# Patient Record
Sex: MALE | Race: BLACK OR AFRICAN AMERICAN | Employment: OTHER | URBAN - METROPOLITAN AREA
[De-identification: names, ages, dates, MRNs, and addresses within clinical notes are randomized per-mention and may not be internally consistent; named-entity substitution may affect disease eponyms.]

---

## 2017-05-02 ENCOUNTER — OFFICE VISIT (OUTPATIENT)
Dept: INTERNAL MEDICINE CLINIC | Age: 72
End: 2017-05-02

## 2017-05-02 VITALS
SYSTOLIC BLOOD PRESSURE: 122 MMHG | BODY MASS INDEX: 26.18 KG/M2 | WEIGHT: 204 LBS | HEART RATE: 50 BPM | OXYGEN SATURATION: 98 % | HEIGHT: 74 IN | DIASTOLIC BLOOD PRESSURE: 80 MMHG

## 2017-05-02 DIAGNOSIS — Z23 NEED FOR VACCINATION: ICD-10-CM

## 2017-05-02 DIAGNOSIS — E78.2 MIXED HYPERLIPIDEMIA: Primary | ICD-10-CM

## 2017-05-02 DIAGNOSIS — I10 ESSENTIAL HYPERTENSION: ICD-10-CM

## 2017-05-02 DIAGNOSIS — R41.3 MEMORY CHANGE: ICD-10-CM

## 2017-05-02 DIAGNOSIS — R73.9 HYPERGLYCEMIA: ICD-10-CM

## 2017-05-02 DIAGNOSIS — I25.10 CORONARY ARTERY DISEASE INVOLVING NATIVE HEART WITHOUT ANGINA PECTORIS, UNSPECIFIED VESSEL OR LESION TYPE: ICD-10-CM

## 2017-05-02 LAB
A/G RATIO: 1.8 (ref 1.1–2.2)
ALBUMIN SERPL-MCNC: 4.2 G/DL (ref 3.4–5)
ALP BLD-CCNC: 42 U/L (ref 40–129)
ALT SERPL-CCNC: 22 U/L (ref 10–40)
ANION GAP SERPL CALCULATED.3IONS-SCNC: 15 MMOL/L (ref 3–16)
AST SERPL-CCNC: 25 U/L (ref 15–37)
BILIRUB SERPL-MCNC: 0.8 MG/DL (ref 0–1)
BILIRUBIN URINE: NEGATIVE
BLOOD, URINE: NEGATIVE
BUN BLDV-MCNC: 16 MG/DL (ref 7–20)
CALCIUM SERPL-MCNC: 8.7 MG/DL (ref 8.3–10.6)
CHLORIDE BLD-SCNC: 105 MMOL/L (ref 99–110)
CLARITY: ABNORMAL
CO2: 24 MMOL/L (ref 21–32)
COLOR: YELLOW
CREAT SERPL-MCNC: 1.4 MG/DL (ref 0.8–1.3)
EPITHELIAL CELLS, UA: 0 /HPF (ref 0–5)
GFR AFRICAN AMERICAN: >60
GFR NON-AFRICAN AMERICAN: 50
GLOBULIN: 2.3 G/DL
GLUCOSE BLD-MCNC: 94 MG/DL (ref 70–99)
GLUCOSE URINE: NEGATIVE MG/DL
HYALINE CASTS: 0 /LPF (ref 0–8)
KETONES, URINE: NEGATIVE MG/DL
LEUKOCYTE ESTERASE, URINE: NEGATIVE
MICROSCOPIC EXAMINATION: YES
NITRITE, URINE: NEGATIVE
PH UA: 6
POTASSIUM SERPL-SCNC: 4.7 MMOL/L (ref 3.5–5.1)
PROTEIN UA: ABNORMAL MG/DL
RBC UA: 4 /HPF (ref 0–4)
SODIUM BLD-SCNC: 144 MMOL/L (ref 136–145)
SPECIFIC GRAVITY UA: 1.02
TOTAL PROTEIN: 6.5 G/DL (ref 6.4–8.2)
URINE TYPE: ABNORMAL
UROBILINOGEN, URINE: 0.2 E.U./DL
WBC UA: 1 /HPF (ref 0–5)

## 2017-05-02 PROCEDURE — G8420 CALC BMI NORM PARAMETERS: HCPCS | Performed by: INTERNAL MEDICINE

## 2017-05-02 PROCEDURE — 3017F COLORECTAL CA SCREEN DOC REV: CPT | Performed by: INTERNAL MEDICINE

## 2017-05-02 PROCEDURE — 4040F PNEUMOC VAC/ADMIN/RCVD: CPT | Performed by: INTERNAL MEDICINE

## 2017-05-02 PROCEDURE — 99214 OFFICE O/P EST MOD 30 MIN: CPT | Performed by: INTERNAL MEDICINE

## 2017-05-02 PROCEDURE — 1036F TOBACCO NON-USER: CPT | Performed by: INTERNAL MEDICINE

## 2017-05-02 PROCEDURE — G8427 DOCREV CUR MEDS BY ELIG CLIN: HCPCS | Performed by: INTERNAL MEDICINE

## 2017-05-02 PROCEDURE — G8598 ASA/ANTIPLAT THER USED: HCPCS | Performed by: INTERNAL MEDICINE

## 2017-05-02 PROCEDURE — 1123F ACP DISCUSS/DSCN MKR DOCD: CPT | Performed by: INTERNAL MEDICINE

## 2017-05-02 RX ORDER — LOSARTAN POTASSIUM 100 MG/1
100 TABLET ORAL DAILY
Qty: 90 TABLET | Refills: 3 | Status: SHIPPED | OUTPATIENT
Start: 2017-05-02 | End: 2018-04-27 | Stop reason: SDUPTHER

## 2017-05-02 RX ORDER — CLOPIDOGREL BISULFATE 75 MG/1
75 TABLET ORAL DAILY
Qty: 90 TABLET | Refills: 3 | Status: SHIPPED | OUTPATIENT
Start: 2017-05-02 | End: 2018-06-14 | Stop reason: SDUPTHER

## 2017-05-02 ASSESSMENT — PATIENT HEALTH QUESTIONNAIRE - PHQ9
2. FEELING DOWN, DEPRESSED OR HOPELESS: 0
SUM OF ALL RESPONSES TO PHQ QUESTIONS 1-9: 0
SUM OF ALL RESPONSES TO PHQ9 QUESTIONS 1 & 2: 0
1. LITTLE INTEREST OR PLEASURE IN DOING THINGS: 0

## 2017-05-02 ASSESSMENT — ENCOUNTER SYMPTOMS
CHEST TIGHTNESS: 0
EYES NEGATIVE: 1
GASTROINTESTINAL NEGATIVE: 1
RESPIRATORY NEGATIVE: 1

## 2017-05-03 LAB
ESTIMATED AVERAGE GLUCOSE: 131.2 MG/DL
HBA1C MFR BLD: 6.2 %

## 2017-05-03 PROCEDURE — 90670 PCV13 VACCINE IM: CPT | Performed by: INTERNAL MEDICINE

## 2017-05-03 PROCEDURE — G0009 ADMIN PNEUMOCOCCAL VACCINE: HCPCS | Performed by: INTERNAL MEDICINE

## 2017-07-06 ENCOUNTER — TELEPHONE (OUTPATIENT)
Dept: INTERNAL MEDICINE CLINIC | Age: 72
End: 2017-07-06

## 2017-09-05 RX ORDER — EZETIMIBE AND SIMVASTATIN 10; 20 MG/1; MG/1
TABLET ORAL
Qty: 90 TABLET | Refills: 0 | Status: SHIPPED | OUTPATIENT
Start: 2017-09-05 | End: 2018-03-15

## 2017-09-07 ENCOUNTER — TELEPHONE (OUTPATIENT)
Dept: INTERNAL MEDICINE CLINIC | Age: 72
End: 2017-09-07

## 2017-09-18 ENCOUNTER — TELEPHONE (OUTPATIENT)
Dept: INTERNAL MEDICINE CLINIC | Age: 72
End: 2017-09-18

## 2017-10-12 ENCOUNTER — OFFICE VISIT (OUTPATIENT)
Dept: INTERNAL MEDICINE CLINIC | Age: 72
End: 2017-10-12

## 2017-10-12 VITALS
TEMPERATURE: 97.9 F | DIASTOLIC BLOOD PRESSURE: 96 MMHG | BODY MASS INDEX: 26.41 KG/M2 | WEIGHT: 205.8 LBS | HEIGHT: 74 IN | HEART RATE: 54 BPM | OXYGEN SATURATION: 98 % | SYSTOLIC BLOOD PRESSURE: 140 MMHG

## 2017-10-12 DIAGNOSIS — C61 PROSTATE CANCER (HCC): Primary | ICD-10-CM

## 2017-10-12 DIAGNOSIS — E78.2 MIXED HYPERLIPIDEMIA: ICD-10-CM

## 2017-10-12 DIAGNOSIS — I10 ESSENTIAL HYPERTENSION: ICD-10-CM

## 2017-10-12 DIAGNOSIS — I25.10 CORONARY ARTERY DISEASE INVOLVING NATIVE HEART WITHOUT ANGINA PECTORIS, UNSPECIFIED VESSEL OR LESION TYPE: ICD-10-CM

## 2017-10-12 DIAGNOSIS — R41.89 IMPAIRED COGNITION: ICD-10-CM

## 2017-10-12 LAB
A/G RATIO: 1.6 (ref 1.1–2.2)
ALBUMIN SERPL-MCNC: 4.2 G/DL (ref 3.4–5)
ALP BLD-CCNC: 49 U/L (ref 40–129)
ALT SERPL-CCNC: 25 U/L (ref 10–40)
ANION GAP SERPL CALCULATED.3IONS-SCNC: 10 MMOL/L (ref 3–16)
AST SERPL-CCNC: 21 U/L (ref 15–37)
BILIRUB SERPL-MCNC: 0.8 MG/DL (ref 0–1)
BUN BLDV-MCNC: 11 MG/DL (ref 7–20)
CALCIUM SERPL-MCNC: 9.4 MG/DL (ref 8.3–10.6)
CHLORIDE BLD-SCNC: 103 MMOL/L (ref 99–110)
CHOLESTEROL, TOTAL: 126 MG/DL (ref 0–199)
CO2: 30 MMOL/L (ref 21–32)
CREAT SERPL-MCNC: 1.5 MG/DL (ref 0.8–1.3)
GFR AFRICAN AMERICAN: 56
GFR NON-AFRICAN AMERICAN: 46
GLOBULIN: 2.6 G/DL
GLUCOSE BLD-MCNC: 100 MG/DL (ref 70–99)
HDLC SERPL-MCNC: 62 MG/DL (ref 40–60)
LDL CHOLESTEROL CALCULATED: 52 MG/DL
POTASSIUM SERPL-SCNC: 4.4 MMOL/L (ref 3.5–5.1)
SODIUM BLD-SCNC: 143 MMOL/L (ref 136–145)
TOTAL PROTEIN: 6.8 G/DL (ref 6.4–8.2)
TRIGL SERPL-MCNC: 58 MG/DL (ref 0–150)
VLDLC SERPL CALC-MCNC: 12 MG/DL

## 2017-10-12 PROCEDURE — 1036F TOBACCO NON-USER: CPT | Performed by: INTERNAL MEDICINE

## 2017-10-12 PROCEDURE — G8427 DOCREV CUR MEDS BY ELIG CLIN: HCPCS | Performed by: INTERNAL MEDICINE

## 2017-10-12 PROCEDURE — 99214 OFFICE O/P EST MOD 30 MIN: CPT | Performed by: INTERNAL MEDICINE

## 2017-10-12 PROCEDURE — G8598 ASA/ANTIPLAT THER USED: HCPCS | Performed by: INTERNAL MEDICINE

## 2017-10-12 PROCEDURE — G8417 CALC BMI ABV UP PARAM F/U: HCPCS | Performed by: INTERNAL MEDICINE

## 2017-10-12 PROCEDURE — G8484 FLU IMMUNIZE NO ADMIN: HCPCS | Performed by: INTERNAL MEDICINE

## 2017-10-12 PROCEDURE — 4040F PNEUMOC VAC/ADMIN/RCVD: CPT | Performed by: INTERNAL MEDICINE

## 2017-10-12 PROCEDURE — 1123F ACP DISCUSS/DSCN MKR DOCD: CPT | Performed by: INTERNAL MEDICINE

## 2017-10-12 PROCEDURE — 3017F COLORECTAL CA SCREEN DOC REV: CPT | Performed by: INTERNAL MEDICINE

## 2017-10-12 ASSESSMENT — ENCOUNTER SYMPTOMS
EYES NEGATIVE: 1
GASTROINTESTINAL NEGATIVE: 1
RESPIRATORY NEGATIVE: 1

## 2017-10-12 NOTE — PROGRESS NOTES
Subjective:      Patient ID: Chetan Ann is a 66 y.o. male. HPIMany problems with memory. Takes meds as prescribed. Wife is here for support. Review of Systems   Constitutional: Negative. HENT: Negative. Eyes: Negative. Respiratory: Negative. Cardiovascular: Negative. Gastrointestinal: Negative. Genitourinary: Negative. Musculoskeletal: Negative. Skin: Negative. Neurological: Negative. Psychiatric/Behavioral: Negative. Objective:   Physical Exam   Constitutional: He is oriented to person, place, and time. He appears well-developed and well-nourished. HENT:   Head: Normocephalic and atraumatic. Left Ear: External ear normal.   Eyes: Conjunctivae and EOM are normal. Pupils are equal, round, and reactive to light. Neck: Normal range of motion. Neck supple. No thyromegaly present. Cardiovascular: Normal rate, regular rhythm and normal heart sounds. No murmur heard. Pulmonary/Chest: Effort normal and breath sounds normal. No respiratory distress. He has no wheezes. He has no rales. Abdominal: Soft. Bowel sounds are normal.   Musculoskeletal: Normal range of motion. Neurological: He is alert and oriented to person, place, and time. Skin: Skin is warm and dry. Psychiatric: He has a normal mood and affect.        Assessment:      Hypertension strable    Hyperlipidemia  On meds    CAD  Stable    Prostate cancer stable     Dementia  persists      Plan:      Continue meds    Refills    Labs

## 2017-11-01 ENCOUNTER — OFFICE VISIT (OUTPATIENT)
Dept: PSYCHIATRY | Age: 72
End: 2017-11-01

## 2017-11-01 VITALS
HEIGHT: 74 IN | SYSTOLIC BLOOD PRESSURE: 130 MMHG | HEART RATE: 96 BPM | WEIGHT: 209 LBS | DIASTOLIC BLOOD PRESSURE: 80 MMHG | OXYGEN SATURATION: 98 % | BODY MASS INDEX: 26.82 KG/M2

## 2017-11-01 DIAGNOSIS — F32.A DEPRESSION, UNSPECIFIED DEPRESSION TYPE: Primary | ICD-10-CM

## 2017-11-01 DIAGNOSIS — G31.84 MCI (MILD COGNITIVE IMPAIRMENT) WITH MEMORY LOSS: ICD-10-CM

## 2017-11-01 PROCEDURE — 4040F PNEUMOC VAC/ADMIN/RCVD: CPT | Performed by: PSYCHIATRY & NEUROLOGY

## 2017-11-01 PROCEDURE — 1036F TOBACCO NON-USER: CPT | Performed by: PSYCHIATRY & NEUROLOGY

## 2017-11-01 PROCEDURE — 3017F COLORECTAL CA SCREEN DOC REV: CPT | Performed by: PSYCHIATRY & NEUROLOGY

## 2017-11-01 PROCEDURE — 99204 OFFICE O/P NEW MOD 45 MIN: CPT | Performed by: PSYCHIATRY & NEUROLOGY

## 2017-11-01 PROCEDURE — G8417 CALC BMI ABV UP PARAM F/U: HCPCS | Performed by: PSYCHIATRY & NEUROLOGY

## 2017-11-01 PROCEDURE — G8598 ASA/ANTIPLAT THER USED: HCPCS | Performed by: PSYCHIATRY & NEUROLOGY

## 2017-11-01 PROCEDURE — 1123F ACP DISCUSS/DSCN MKR DOCD: CPT | Performed by: PSYCHIATRY & NEUROLOGY

## 2017-11-01 PROCEDURE — G8484 FLU IMMUNIZE NO ADMIN: HCPCS | Performed by: PSYCHIATRY & NEUROLOGY

## 2017-11-01 PROCEDURE — G8428 CUR MEDS NOT DOCUMENT: HCPCS | Performed by: PSYCHIATRY & NEUROLOGY

## 2017-11-01 RX ORDER — ESCITALOPRAM OXALATE 10 MG/1
5 TABLET ORAL DAILY
Qty: 30 TABLET | Refills: 2 | Status: SHIPPED | OUTPATIENT
Start: 2017-11-01 | End: 2017-12-26

## 2017-11-01 ASSESSMENT — ENCOUNTER SYMPTOMS
GASTROINTESTINAL NEGATIVE: 1
RESPIRATORY NEGATIVE: 1
EYES NEGATIVE: 1

## 2017-11-01 NOTE — PROGRESS NOTES
Outpatient Psychiatric Consult  General Mekhi M.D. Date: 11/1/2017    Total Duration of Visit: 50min      Chief Complaint/Reason for Consult:  Chief Complaint   Patient presents with    New Patient        HPI:     Fredo Thayer is a 66 y.o. male with a past psychiatric history of MCI presenting for medication evaluation from Dr. Angie Stoll. Pt started to have memory problems a few years ago. He was seen by Dr. Yarely Meza and had work up of neuro psych testing, MRI, and started on Arricept. Not much change in memory with medication. Pt lost his mother this past year and that is when wife and pt started noting worsening of cognitive problems. Pt admits only to some memory problems and denies depression, anxiety, or frustration. Wife reports pt is unable to remember meds, easily confused, unable to follow multi step requests, sometimes he'll be sitting at home and ask when he'll get home. Wife also notes he is more irritable, easily agitated, obsessive, and pacing. She says this is a change from him 5 yrs ago when he'd let little things slide. Sleep is disrupted mostly because of urinary urgency. Pt did admit to many family and friend losses recently that may be impacting mood and cognition. Psychiatric Focused ROS:    Depressive sxs:  Pt denies all sxs. Attends karate. No anhedonia. No SI/HI. Manic sxs:  Denies review of sxs. Anxiety sxs: Pt denies all sxs. But wife thinks irritable  Constant worry:No  Irritability/ edgy:No  Disrupted sleep:No  Somatic/ tension:No  Restless/ fatigue:No    Psychotic sxs: Denies AVH, paranoia, delusions    Other sxs: MCI- refer to neuropsych testing and neurology notes.      Past Psychiatric History:   Diagnosis: MCI  Admissions:none  Self harming:none  Suicide attempts:none  Prior medications:  arricept X 1 year      Current Medications:   Current Outpatient Prescriptions   Medication Sig Dispense Refill    escitalopram (LEXAPRO) 10 MG tablet Take 0.5 tablets by mouth daily 30 tablet 2    ezetimibe-simvastatin (VYTORIN) 10-20 MG per tablet TAKE 1 TABLET BY MOUTH EVERY NIGHT 90 tablet 0    losartan (COZAAR) 100 MG tablet Take 1 tablet by mouth daily 90 tablet 3    metoprolol tartrate (LOPRESSOR) 25 MG tablet Take 1 tablet by mouth 2 times daily 180 tablet 5    clopidogrel (PLAVIX) 75 MG tablet Take 1 tablet by mouth daily 90 tablet 3    Avanafil (STENDRA) 200 MG TABS Take 200 mg by mouth as needed (prn) 6 tablet 3    ezetimibe-simvastatin (VYTORIN) 10-20 MG per tablet Take 1 tablet by mouth nightly 90 tablet 5    metoprolol tartrate (LOPRESSOR) 25 MG tablet Take 1 tablet by mouth 2 times daily 180 tablet 3    tadalafil (CIALIS) 5 MG tablet Take 1 tablet by mouth daily 30 tablet 3    triamcinolone (NASACORT) 55 MCG/ACT nasal inhaler 2 sprays by Nasal route 2 times daily. 16.5 Inhaler 4    clindamycin (CLEOCIN) 300 MG capsule Take 1 capsule by mouth 4 times daily. 120 capsule 1    doxycycline (VIBRA-TABS) 100 MG tablet Take 100 mg by mouth 2 times daily.  Multiple Vitamins-Minerals (CENTRUM SILVER ULTRA MENS) TABS Take 1 tablet by mouth daily.  ascorbic acid (VITAMIN C) 1000 MG tablet Take 1,000 mg by mouth daily.  tadalafil (CIALIS) 10 MG tablet Take 20 mg by mouth as needed.  aspirin 81 MG EC tablet Take 81 mg by mouth daily 325mg      omega-3 acid ethyl esters (LOVAZA) 1 GM capsule Take 1 g by mouth daily. No current facility-administered medications for this visit. Substance Abuse History:  Alcohol:none  Tob: none  Drugs: none    Social History/Developmental History/Trauma History:   . One son from previous relationship. 1 daughter 31yo. 4 grandkids. Pt is . Son is in law. Attends [employer] and [employer].      Family Psychiatric History:   Parents had dementia but both in 80s    Medical History:   Past Medical History:   Diagnosis Date    Anxiety     BPH     CAD (coronary artery disease)     Depression     Hyperlipidemia     Hypertension     Sinusitis     Vertigo        Surgical History:   Past Surgical History:   Procedure Laterality Date    ANGIOPLASTY  2009       Labs/Imaging/EKG:    Office Visit on 10/12/2017   Component Date Value Ref Range Status    Sodium 10/12/2017 143  136 - 145 mmol/L Final    Potassium 10/12/2017 4.4  3.5 - 5.1 mmol/L Final    Chloride 10/12/2017 103  99 - 110 mmol/L Final    CO2 10/12/2017 30  21 - 32 mmol/L Final    Anion Gap 10/12/2017 10  3 - 16 Final    Glucose 10/12/2017 100* 70 - 99 mg/dL Final    BUN 10/12/2017 11  7 - 20 mg/dL Final    CREATININE 10/12/2017 1.5* 0.8 - 1.3 mg/dL Final    GFR Non- 10/12/2017 46* >60 Final    GFR  10/12/2017 56* >60 Final    Calcium 10/12/2017 9.4  8.3 - 10.6 mg/dL Final    Total Protein 10/12/2017 6.8  6.4 - 8.2 g/dL Final    Alb 10/12/2017 4.2  3.4 - 5.0 g/dL Final    Albumin/Globulin Ratio 10/12/2017 1.6  1.1 - 2.2 Final    Total Bilirubin 10/12/2017 0.8  0.0 - 1.0 mg/dL Final    Alkaline Phosphatase 10/12/2017 49  40 - 129 U/L Final    ALT 10/12/2017 25  10 - 40 U/L Final    AST 10/12/2017 21  15 - 37 U/L Final    Globulin 10/12/2017 2.6  g/dL Final    Cholesterol, Total 10/12/2017 126  0 - 199 mg/dL Final    Triglycerides 10/12/2017 58  0 - 150 mg/dL Final    HDL 10/12/2017 62* 40 - 60 mg/dL Final    LDL Calculated 10/12/2017 52  <100 mg/dL Final    VLDL CHOLESTEROL CALCULATED 10/12/2017 12  Not Established mg/dL Final   Orders Only on 05/02/2017   Component Date Value Ref Range Status    Hyaline Casts, UA 05/02/2017 0  0 - 8 /LPF Final    WBC, UA 05/02/2017 1  0 - 5 /HPF Final    RBC, UA 05/02/2017 4  0 - 4 /HPF Final    Epi Cells 05/02/2017 0  0 - 5 /HPF Final   Office Visit on 05/02/2017   Component Date Value Ref Range Status    Sodium 05/02/2017 144  136 - 145 mmol/L Final    Potassium 05/02/2017 4.7  3.5 - 5.1 mmol/L Final    Chloride 05/02/2017 105  99 - 110 mmol/L Final  CO2 05/02/2017 24  21 - 32 mmol/L Final    Anion Gap 05/02/2017 15  3 - 16 Final    Glucose 05/02/2017 94  70 - 99 mg/dL Final    BUN 05/02/2017 16  7 - 20 mg/dL Final    CREATININE 05/02/2017 1.4* 0.8 - 1.3 mg/dL Final    GFR Non- 05/02/2017 50* >60 Final    GFR  05/02/2017 >60  >60 Final    Calcium 05/02/2017 8.7  8.3 - 10.6 mg/dL Final    Total Protein 05/02/2017 6.5  6.4 - 8.2 g/dL Final    Alb 05/02/2017 4.2  3.4 - 5.0 g/dL Final    Albumin/Globulin Ratio 05/02/2017 1.8  1.1 - 2.2 Final    Total Bilirubin 05/02/2017 0.8  0.0 - 1.0 mg/dL Final    Alkaline Phosphatase 05/02/2017 42  40 - 129 U/L Final    ALT 05/02/2017 22  10 - 40 U/L Final    AST 05/02/2017 25  15 - 37 U/L Final    Globulin 05/02/2017 2.3  g/dL Final    Hemoglobin A1C 05/03/2017 6.2  See comment % Final    eAG 05/03/2017 131.2  mg/dL Final    Color, UA 05/02/2017 YELLOW  Straw/Yellow Final    Clarity, UA 05/02/2017 TURBID* Clear Final    Glucose, Ur 05/02/2017 Negative  Negative mg/dL Final    Bilirubin Urine 05/02/2017 Negative  Negative Final    Ketones, Urine 05/02/2017 Negative  Negative mg/dL Final    Specific Gravity, UA 05/02/2017 1.025  1.005 - 1.030 Final    Blood, Urine 05/02/2017 Negative  Negative Final    pH, UA 05/02/2017 6.0  5.0 - 8.0 Final    Protein, UA 05/02/2017 TRACE* Negative mg/dL Final    Urobilinogen, Urine 05/02/2017 0.2  <2.0 E.U./dL Final    Nitrite, Urine 05/02/2017 Negative  Negative Final    Leukocyte Esterase, Urine 05/02/2017 Negative  Negative Final    Microscopic Examination 05/02/2017 YES   Final    Urine Type 05/02/2017 Clean catch   Final       Review of Systems   Constitutional: Negative. HENT: Negative. Eyes: Negative. Respiratory: Negative. Cardiovascular: Negative. Gastrointestinal: Negative. Genitourinary: Negative. Musculoskeletal: Negative. Skin: Negative. Neurological: Negative.     Endo/Heme/Allergies: Negative. Psychiatric/Behavioral: Positive for memory loss. Vital Signs: /80   Pulse 96   Ht 6' 2\" (1.88 m)   Wt 209 lb (94.8 kg)   SpO2 98%   BMI 26.83 kg/m²     MSE:     Appearance: well groomed, appropriately dressed  Motor Activity:  normal psychomotor activity, posture, and movements  Speech: normal rate/volume, articulation   Attitude: cooperative   Affect: euthymic range, stable throughout interview, appropriate to content of speech  Mood: patient throughout interview expresses range of emotions and feelings within normal limits  Thought Processes: well organized, goal directed  Thought Content: no delusions, obsessions, preoccupations, overvalued ideas, or ruminations  Suicidal Ideation: patient denies  Homicidal Ideation: patient denies  Perceptions:  no hallucinations or other perceptual abnormalities  Insight/Judgment: no formal deficits noted and patient has a reasonable lay person's understanding of the reasons for this evaluation, the treatment being recommended during this evaluation, and the medical conditions for which these treatments are being recommended. Cognition: Alert, oriented to person, place, time, and situation; good fund of knowledge, good recall of recent and remote events    Assessment:  Diagnosis:   1. Depression, unspecified depression type    2. MCI (mild cognitive impairment) with memory loss        Recommendations:  1. Depression: Pt with documented memory decline and treatment at [facility] neurology. It was recommended that pt follow up for continued testing if memory declined further. Wife who is [address] provider notes some depressive/ anxiety sxs. Which may also be stemming from the memory loss (and recent deaths) but also may be contributing to decline. Pt willing to try AD to see if helpful. Start lexapro 5mg daily. Reviewed R/B/SE. Encouraged to f/u with [facility] for continued testing and consideration of adding namenda.   No safety concerns  F/U in 1

## 2017-12-12 ENCOUNTER — OFFICE VISIT (OUTPATIENT)
Dept: INTERNAL MEDICINE CLINIC | Age: 72
End: 2017-12-12

## 2017-12-12 VITALS
BODY MASS INDEX: 26.56 KG/M2 | WEIGHT: 207 LBS | HEART RATE: 84 BPM | DIASTOLIC BLOOD PRESSURE: 84 MMHG | OXYGEN SATURATION: 98 % | SYSTOLIC BLOOD PRESSURE: 116 MMHG | HEIGHT: 74 IN

## 2017-12-12 DIAGNOSIS — Z11.3 SCREEN FOR STD (SEXUALLY TRANSMITTED DISEASE): ICD-10-CM

## 2017-12-12 DIAGNOSIS — R97.20 ELEVATED PSA: ICD-10-CM

## 2017-12-12 DIAGNOSIS — R41.3 MEMORY CHANGE: ICD-10-CM

## 2017-12-12 DIAGNOSIS — C61 PROSTATE CANCER (HCC): Primary | ICD-10-CM

## 2017-12-12 PROCEDURE — 4040F PNEUMOC VAC/ADMIN/RCVD: CPT | Performed by: INTERNAL MEDICINE

## 2017-12-12 PROCEDURE — G8484 FLU IMMUNIZE NO ADMIN: HCPCS | Performed by: INTERNAL MEDICINE

## 2017-12-12 PROCEDURE — G8598 ASA/ANTIPLAT THER USED: HCPCS | Performed by: INTERNAL MEDICINE

## 2017-12-12 PROCEDURE — 3017F COLORECTAL CA SCREEN DOC REV: CPT | Performed by: INTERNAL MEDICINE

## 2017-12-12 PROCEDURE — 1036F TOBACCO NON-USER: CPT | Performed by: INTERNAL MEDICINE

## 2017-12-12 PROCEDURE — G8427 DOCREV CUR MEDS BY ELIG CLIN: HCPCS | Performed by: INTERNAL MEDICINE

## 2017-12-12 PROCEDURE — 99214 OFFICE O/P EST MOD 30 MIN: CPT | Performed by: INTERNAL MEDICINE

## 2017-12-12 PROCEDURE — 1123F ACP DISCUSS/DSCN MKR DOCD: CPT | Performed by: INTERNAL MEDICINE

## 2017-12-12 PROCEDURE — G8417 CALC BMI ABV UP PARAM F/U: HCPCS | Performed by: INTERNAL MEDICINE

## 2017-12-12 ASSESSMENT — ENCOUNTER SYMPTOMS
GASTROINTESTINAL NEGATIVE: 1
EYES NEGATIVE: 1
RESPIRATORY NEGATIVE: 1

## 2017-12-13 LAB — HEPATITIS C ANTIBODY INTERPRETATION: NORMAL

## 2017-12-26 ENCOUNTER — INITIAL CONSULT (OUTPATIENT)
Dept: PSYCHIATRY | Age: 72
End: 2017-12-26

## 2017-12-26 VITALS
OXYGEN SATURATION: 98 % | BODY MASS INDEX: 26.44 KG/M2 | HEART RATE: 51 BPM | SYSTOLIC BLOOD PRESSURE: 110 MMHG | WEIGHT: 206 LBS | DIASTOLIC BLOOD PRESSURE: 80 MMHG | HEIGHT: 74 IN

## 2017-12-26 DIAGNOSIS — F32.A DEPRESSION, UNSPECIFIED DEPRESSION TYPE: Primary | ICD-10-CM

## 2017-12-26 PROCEDURE — 99214 OFFICE O/P EST MOD 30 MIN: CPT | Performed by: PSYCHIATRY & NEUROLOGY

## 2017-12-26 RX ORDER — ESCITALOPRAM OXALATE 10 MG/1
10 TABLET ORAL DAILY
Qty: 30 TABLET | Refills: 2 | Status: SHIPPED | OUTPATIENT
Start: 2017-12-26 | End: 2017-12-26 | Stop reason: SDUPTHER

## 2017-12-26 RX ORDER — ESCITALOPRAM OXALATE 10 MG/1
10 TABLET ORAL DAILY
Qty: 30 TABLET | Refills: 2 | Status: SHIPPED | OUTPATIENT
Start: 2017-12-26 | End: 2018-02-12

## 2017-12-26 ASSESSMENT — ENCOUNTER SYMPTOMS
RESPIRATORY NEGATIVE: 1
EYES NEGATIVE: 1
GASTROINTESTINAL NEGATIVE: 1

## 2017-12-26 NOTE — PROGRESS NOTES
Component Date Value Ref Range Status    Hep C Ab Interp 12/13/2017 Non-reactive  Non-reactive Final   Office Visit on 10/12/2017   Component Date Value Ref Range Status    Sodium 10/12/2017 143  136 - 145 mmol/L Final    Potassium 10/12/2017 4.4  3.5 - 5.1 mmol/L Final    Chloride 10/12/2017 103  99 - 110 mmol/L Final    CO2 10/12/2017 30  21 - 32 mmol/L Final    Anion Gap 10/12/2017 10  3 - 16 Final    Glucose 10/12/2017 100* 70 - 99 mg/dL Final    BUN 10/12/2017 11  7 - 20 mg/dL Final    CREATININE 10/12/2017 1.5* 0.8 - 1.3 mg/dL Final    GFR Non- 10/12/2017 46* >60 Final    GFR  10/12/2017 56* >60 Final    Calcium 10/12/2017 9.4  8.3 - 10.6 mg/dL Final    Total Protein 10/12/2017 6.8  6.4 - 8.2 g/dL Final    Alb 10/12/2017 4.2  3.4 - 5.0 g/dL Final    Albumin/Globulin Ratio 10/12/2017 1.6  1.1 - 2.2 Final    Total Bilirubin 10/12/2017 0.8  0.0 - 1.0 mg/dL Final    Alkaline Phosphatase 10/12/2017 49  40 - 129 U/L Final    ALT 10/12/2017 25  10 - 40 U/L Final    AST 10/12/2017 21  15 - 37 U/L Final    Globulin 10/12/2017 2.6  g/dL Final    Cholesterol, Total 10/12/2017 126  0 - 199 mg/dL Final    Triglycerides 10/12/2017 58  0 - 150 mg/dL Final    HDL 10/12/2017 62* 40 - 60 mg/dL Final    LDL Calculated 10/12/2017 52  <100 mg/dL Final    VLDL CHOLESTEROL CALCULATED 10/12/2017 12  Not Established mg/dL Final         Assessment:  Diagnosis:   1. Depression, unspecified depression type         Recommendations:  1. Depression: Pt with documented memory decline and treatment at [facility] neurology. It was recommended that pt follow up for continued testing if memory declined further. Wife who is [address] provider notes some depressive/ anxiety sxs. Which may also be stemming from the memory loss (and recent deaths) but also may be contributing to decline. Pt willing to try AD to see if helpful. Increase lexapro to 10mg daily. Reviewed R/B/SE.    Encouraged to f/u

## 2018-01-23 ENCOUNTER — INITIAL CONSULT (OUTPATIENT)
Dept: PSYCHIATRY | Age: 73
End: 2018-01-23

## 2018-01-23 VITALS
WEIGHT: 209 LBS | HEIGHT: 74 IN | SYSTOLIC BLOOD PRESSURE: 150 MMHG | HEART RATE: 46 BPM | BODY MASS INDEX: 26.82 KG/M2 | DIASTOLIC BLOOD PRESSURE: 80 MMHG | OXYGEN SATURATION: 97 %

## 2018-01-23 DIAGNOSIS — F41.9 ANXIETY DISORDER, UNSPECIFIED TYPE: ICD-10-CM

## 2018-01-23 DIAGNOSIS — F03.90 DEMENTIA WITHOUT BEHAVIORAL DISTURBANCE, UNSPECIFIED DEMENTIA TYPE: Primary | ICD-10-CM

## 2018-01-23 DIAGNOSIS — F32.A DEPRESSION, UNSPECIFIED DEPRESSION TYPE: ICD-10-CM

## 2018-01-23 PROCEDURE — 99214 OFFICE O/P EST MOD 30 MIN: CPT | Performed by: PSYCHIATRY & NEUROLOGY

## 2018-01-23 ASSESSMENT — ENCOUNTER SYMPTOMS
EYES NEGATIVE: 1
GASTROINTESTINAL NEGATIVE: 1
RESPIRATORY NEGATIVE: 1

## 2018-01-23 NOTE — PROGRESS NOTES
Current Medications:   Current Outpatient Prescriptions   Medication Sig Dispense Refill    metoprolol tartrate (LOPRESSOR) 25 MG tablet TAKE 1 TABLET BY MOUTH TWICE DAILY 180 tablet 0    escitalopram (LEXAPRO) 10 MG tablet Take 1 tablet by mouth daily 30 tablet 2    ezetimibe-simvastatin (VYTORIN) 10-20 MG per tablet TAKE 1 TABLET BY MOUTH EVERY NIGHT 90 tablet 0    losartan (COZAAR) 100 MG tablet Take 1 tablet by mouth daily 90 tablet 3    metoprolol tartrate (LOPRESSOR) 25 MG tablet Take 1 tablet by mouth 2 times daily 180 tablet 5    clopidogrel (PLAVIX) 75 MG tablet Take 1 tablet by mouth daily 90 tablet 3    Avanafil (STENDRA) 200 MG TABS Take 200 mg by mouth as needed (prn) 6 tablet 3    ezetimibe-simvastatin (VYTORIN) 10-20 MG per tablet Take 1 tablet by mouth nightly 90 tablet 5    tadalafil (CIALIS) 5 MG tablet Take 1 tablet by mouth daily 30 tablet 3    triamcinolone (NASACORT) 55 MCG/ACT nasal inhaler 2 sprays by Nasal route 2 times daily. 16.5 Inhaler 4    clindamycin (CLEOCIN) 300 MG capsule Take 1 capsule by mouth 4 times daily. 120 capsule 1    doxycycline (VIBRA-TABS) 100 MG tablet Take 100 mg by mouth 2 times daily.  Multiple Vitamins-Minerals (CENTRUM SILVER ULTRA MENS) TABS Take 1 tablet by mouth daily.  ascorbic acid (VITAMIN C) 1000 MG tablet Take 1,000 mg by mouth daily.  tadalafil (CIALIS) 10 MG tablet Take 20 mg by mouth as needed.  aspirin 81 MG EC tablet Take 81 mg by mouth daily 325mg      omega-3 acid ethyl esters (LOVAZA) 1 GM capsule Take 1 g by mouth daily. No current facility-administered medications for this visit.         Labs/Imaging/EKG:    Office Visit on 12/12/2017   Component Date Value Ref Range Status    Hep C Ab Interp 12/13/2017 Non-reactive  Non-reactive Final   Office Visit on 10/12/2017   Component Date Value Ref Range Status    Sodium 10/12/2017 143  136 - 145 mmol/L Final    Potassium 10/12/2017 4.4  3.5 - 5.1

## 2018-02-12 DIAGNOSIS — F32.A DEPRESSION, UNSPECIFIED DEPRESSION TYPE: ICD-10-CM

## 2018-02-12 RX ORDER — ESCITALOPRAM OXALATE 10 MG/1
10 TABLET ORAL DAILY
Qty: 30 TABLET | Refills: 2 | OUTPATIENT
Start: 2018-02-12

## 2018-02-12 RX ORDER — ESCITALOPRAM OXALATE 20 MG/1
20 TABLET ORAL DAILY
Qty: 30 TABLET | Refills: 2 | Status: SHIPPED | OUTPATIENT
Start: 2018-02-12 | End: 2018-04-16 | Stop reason: SDUPTHER

## 2018-03-15 ENCOUNTER — OFFICE VISIT (OUTPATIENT)
Dept: INTERNAL MEDICINE CLINIC | Age: 73
End: 2018-03-15

## 2018-03-15 VITALS
HEIGHT: 74 IN | OXYGEN SATURATION: 96 % | SYSTOLIC BLOOD PRESSURE: 154 MMHG | HEART RATE: 52 BPM | DIASTOLIC BLOOD PRESSURE: 84 MMHG | WEIGHT: 212 LBS | BODY MASS INDEX: 27.21 KG/M2

## 2018-03-15 DIAGNOSIS — I25.10 CORONARY ARTERY DISEASE INVOLVING NATIVE HEART WITHOUT ANGINA PECTORIS, UNSPECIFIED VESSEL OR LESION TYPE: ICD-10-CM

## 2018-03-15 DIAGNOSIS — E78.2 MIXED HYPERLIPIDEMIA: ICD-10-CM

## 2018-03-15 DIAGNOSIS — Z23 NEED FOR PROPHYLACTIC VACCINATION AGAINST DIPHTHERIA-TETANUS-PERTUSSIS (DTP): Primary | ICD-10-CM

## 2018-03-15 LAB
A/G RATIO: 1.6 (ref 1.1–2.2)
ALBUMIN SERPL-MCNC: 4.4 G/DL (ref 3.4–5)
ALP BLD-CCNC: 54 U/L (ref 40–129)
ALT SERPL-CCNC: 31 U/L (ref 10–40)
ANION GAP SERPL CALCULATED.3IONS-SCNC: 13 MMOL/L (ref 3–16)
AST SERPL-CCNC: 25 U/L (ref 15–37)
BILIRUB SERPL-MCNC: 0.6 MG/DL (ref 0–1)
BUN BLDV-MCNC: 15 MG/DL (ref 7–20)
CALCIUM SERPL-MCNC: 9 MG/DL (ref 8.3–10.6)
CHLORIDE BLD-SCNC: 104 MMOL/L (ref 99–110)
CHOLESTEROL, TOTAL: 139 MG/DL (ref 0–199)
CO2: 28 MMOL/L (ref 21–32)
CREAT SERPL-MCNC: 1.4 MG/DL (ref 0.8–1.3)
GFR AFRICAN AMERICAN: >60
GFR NON-AFRICAN AMERICAN: 50
GLOBULIN: 2.7 G/DL
GLUCOSE BLD-MCNC: 103 MG/DL (ref 70–99)
HDLC SERPL-MCNC: 74 MG/DL (ref 40–60)
LDL CHOLESTEROL CALCULATED: 54 MG/DL
POTASSIUM SERPL-SCNC: 4.4 MMOL/L (ref 3.5–5.1)
SODIUM BLD-SCNC: 145 MMOL/L (ref 136–145)
TOTAL PROTEIN: 7.1 G/DL (ref 6.4–8.2)
TRIGL SERPL-MCNC: 56 MG/DL (ref 0–150)
VLDLC SERPL CALC-MCNC: 11 MG/DL

## 2018-03-15 PROCEDURE — 3017F COLORECTAL CA SCREEN DOC REV: CPT | Performed by: INTERNAL MEDICINE

## 2018-03-15 PROCEDURE — 1123F ACP DISCUSS/DSCN MKR DOCD: CPT | Performed by: INTERNAL MEDICINE

## 2018-03-15 PROCEDURE — G8417 CALC BMI ABV UP PARAM F/U: HCPCS | Performed by: INTERNAL MEDICINE

## 2018-03-15 PROCEDURE — 4040F PNEUMOC VAC/ADMIN/RCVD: CPT | Performed by: INTERNAL MEDICINE

## 2018-03-15 PROCEDURE — 1036F TOBACCO NON-USER: CPT | Performed by: INTERNAL MEDICINE

## 2018-03-15 PROCEDURE — G8484 FLU IMMUNIZE NO ADMIN: HCPCS | Performed by: INTERNAL MEDICINE

## 2018-03-15 PROCEDURE — G8427 DOCREV CUR MEDS BY ELIG CLIN: HCPCS | Performed by: INTERNAL MEDICINE

## 2018-03-15 PROCEDURE — 99214 OFFICE O/P EST MOD 30 MIN: CPT | Performed by: INTERNAL MEDICINE

## 2018-03-15 PROCEDURE — G8598 ASA/ANTIPLAT THER USED: HCPCS | Performed by: INTERNAL MEDICINE

## 2018-03-15 ASSESSMENT — ENCOUNTER SYMPTOMS
RESPIRATORY NEGATIVE: 1
GASTROINTESTINAL NEGATIVE: 1
EYES NEGATIVE: 1

## 2018-03-16 RX ORDER — EZETIMIBE AND SIMVASTATIN 10; 20 MG/1; MG/1
TABLET ORAL
Qty: 90 TABLET | Refills: 0 | Status: SHIPPED | OUTPATIENT
Start: 2018-03-16 | End: 2018-06-14 | Stop reason: SDUPTHER

## 2018-04-16 RX ORDER — ESCITALOPRAM OXALATE 20 MG/1
20 TABLET ORAL DAILY
Qty: 30 TABLET | Refills: 3 | Status: SHIPPED | OUTPATIENT
Start: 2018-04-16 | End: 2018-05-22 | Stop reason: SDUPTHER

## 2018-04-24 ENCOUNTER — INITIAL CONSULT (OUTPATIENT)
Dept: PSYCHIATRY | Age: 73
End: 2018-04-24

## 2018-04-24 VITALS
WEIGHT: 222 LBS | BODY MASS INDEX: 28.5 KG/M2 | OXYGEN SATURATION: 98 % | SYSTOLIC BLOOD PRESSURE: 160 MMHG | DIASTOLIC BLOOD PRESSURE: 90 MMHG | HEART RATE: 51 BPM

## 2018-04-24 DIAGNOSIS — G31.09 FRONTO-TEMPORAL DEMENTIA (HCC): Primary | ICD-10-CM

## 2018-04-24 DIAGNOSIS — F02.80 FRONTO-TEMPORAL DEMENTIA (HCC): ICD-10-CM

## 2018-04-24 DIAGNOSIS — G31.09 FRONTO-TEMPORAL DEMENTIA (HCC): ICD-10-CM

## 2018-04-24 DIAGNOSIS — F32.A DEPRESSION, UNSPECIFIED DEPRESSION TYPE: ICD-10-CM

## 2018-04-24 DIAGNOSIS — F41.9 ANXIETY DISORDER, UNSPECIFIED TYPE: ICD-10-CM

## 2018-04-24 DIAGNOSIS — F02.80 FRONTO-TEMPORAL DEMENTIA (HCC): Primary | ICD-10-CM

## 2018-04-24 PROCEDURE — 99214 OFFICE O/P EST MOD 30 MIN: CPT | Performed by: PSYCHIATRY & NEUROLOGY

## 2018-04-24 RX ORDER — QUETIAPINE FUMARATE 25 MG/1
12.5 TABLET, FILM COATED ORAL NIGHTLY
Qty: 30 TABLET | Refills: 2 | Status: SHIPPED | OUTPATIENT
Start: 2018-04-24 | End: 2018-04-24 | Stop reason: SDUPTHER

## 2018-04-24 RX ORDER — QUETIAPINE FUMARATE 25 MG/1
TABLET, FILM COATED ORAL
Qty: 45 TABLET | Refills: 2 | Status: SHIPPED | OUTPATIENT
Start: 2018-04-24 | End: 2018-08-28 | Stop reason: SDUPTHER

## 2018-04-24 ASSESSMENT — ENCOUNTER SYMPTOMS
EYES NEGATIVE: 1
GASTROINTESTINAL NEGATIVE: 1
RESPIRATORY NEGATIVE: 1

## 2018-04-27 DIAGNOSIS — I10 ESSENTIAL HYPERTENSION: ICD-10-CM

## 2018-04-27 RX ORDER — LOSARTAN POTASSIUM 100 MG/1
100 TABLET ORAL DAILY
Qty: 90 TABLET | Refills: 5 | Status: SHIPPED | OUTPATIENT
Start: 2018-04-27 | End: 2019-04-29 | Stop reason: SDUPTHER

## 2018-05-22 RX ORDER — ESCITALOPRAM OXALATE 20 MG/1
20 TABLET ORAL DAILY
Qty: 30 TABLET | Refills: 3 | Status: SHIPPED | OUTPATIENT
Start: 2018-05-22 | End: 2018-07-30 | Stop reason: SDUPTHER

## 2018-05-29 ENCOUNTER — INITIAL CONSULT (OUTPATIENT)
Dept: PSYCHIATRY | Age: 73
End: 2018-05-29

## 2018-05-29 DIAGNOSIS — F41.9 ANXIETY DISORDER, UNSPECIFIED TYPE: ICD-10-CM

## 2018-05-29 DIAGNOSIS — F02.80 OTHER FRONTOTEMPORAL DEMENTIA WITHOUT BEHAVIORAL DISTURBANCE: Primary | ICD-10-CM

## 2018-05-29 DIAGNOSIS — G31.09 OTHER FRONTOTEMPORAL DEMENTIA WITHOUT BEHAVIORAL DISTURBANCE: Primary | ICD-10-CM

## 2018-05-29 DIAGNOSIS — F32.A DEPRESSION, UNSPECIFIED DEPRESSION TYPE: ICD-10-CM

## 2018-05-29 PROCEDURE — 99214 OFFICE O/P EST MOD 30 MIN: CPT | Performed by: PSYCHIATRY & NEUROLOGY

## 2018-05-29 ASSESSMENT — ENCOUNTER SYMPTOMS
RESPIRATORY NEGATIVE: 1
EYES NEGATIVE: 1
GASTROINTESTINAL NEGATIVE: 1

## 2018-05-30 VITALS
SYSTOLIC BLOOD PRESSURE: 130 MMHG | OXYGEN SATURATION: 98 % | HEART RATE: 53 BPM | DIASTOLIC BLOOD PRESSURE: 68 MMHG | WEIGHT: 224 LBS | BODY MASS INDEX: 28.76 KG/M2

## 2018-06-05 ENCOUNTER — TELEPHONE (OUTPATIENT)
Dept: INTERNAL MEDICINE CLINIC | Age: 73
End: 2018-06-05

## 2018-06-05 RX ORDER — MEMANTINE HYDROCHLORIDE 10 MG/1
10 TABLET ORAL 2 TIMES DAILY
Status: ON HOLD | COMMUNITY
End: 2020-01-01 | Stop reason: HOSPADM

## 2018-06-19 ENCOUNTER — OFFICE VISIT (OUTPATIENT)
Dept: INTERNAL MEDICINE CLINIC | Age: 73
End: 2018-06-19

## 2018-06-19 VITALS
OXYGEN SATURATION: 99 % | HEART RATE: 54 BPM | HEIGHT: 74 IN | WEIGHT: 221 LBS | SYSTOLIC BLOOD PRESSURE: 134 MMHG | DIASTOLIC BLOOD PRESSURE: 90 MMHG | BODY MASS INDEX: 28.36 KG/M2

## 2018-06-19 DIAGNOSIS — E78.2 MIXED HYPERLIPIDEMIA: ICD-10-CM

## 2018-06-19 DIAGNOSIS — I10 ESSENTIAL HYPERTENSION: ICD-10-CM

## 2018-06-19 DIAGNOSIS — M54.2 NECK PAIN: ICD-10-CM

## 2018-06-19 DIAGNOSIS — I25.10 CORONARY ARTERY DISEASE INVOLVING NATIVE HEART WITHOUT ANGINA PECTORIS, UNSPECIFIED VESSEL OR LESION TYPE: ICD-10-CM

## 2018-06-19 DIAGNOSIS — R41.3 MEMORY CHANGE: ICD-10-CM

## 2018-06-19 DIAGNOSIS — C61 PROSTATE CANCER (HCC): Primary | ICD-10-CM

## 2018-06-19 DIAGNOSIS — Z83.3 FAMILY HISTORY OF DIABETES MELLITUS (DM): ICD-10-CM

## 2018-06-19 PROCEDURE — 4040F PNEUMOC VAC/ADMIN/RCVD: CPT | Performed by: INTERNAL MEDICINE

## 2018-06-19 PROCEDURE — G8427 DOCREV CUR MEDS BY ELIG CLIN: HCPCS | Performed by: INTERNAL MEDICINE

## 2018-06-19 PROCEDURE — 3017F COLORECTAL CA SCREEN DOC REV: CPT | Performed by: INTERNAL MEDICINE

## 2018-06-19 PROCEDURE — 1036F TOBACCO NON-USER: CPT | Performed by: INTERNAL MEDICINE

## 2018-06-19 PROCEDURE — 1123F ACP DISCUSS/DSCN MKR DOCD: CPT | Performed by: INTERNAL MEDICINE

## 2018-06-19 PROCEDURE — G8598 ASA/ANTIPLAT THER USED: HCPCS | Performed by: INTERNAL MEDICINE

## 2018-06-19 PROCEDURE — G8417 CALC BMI ABV UP PARAM F/U: HCPCS | Performed by: INTERNAL MEDICINE

## 2018-06-19 PROCEDURE — 3288F FALL RISK ASSESSMENT DOCD: CPT | Performed by: INTERNAL MEDICINE

## 2018-06-19 PROCEDURE — 99214 OFFICE O/P EST MOD 30 MIN: CPT | Performed by: INTERNAL MEDICINE

## 2018-06-19 RX ORDER — CYANOCOBALAMIN 1000 UG/ML
1000 INJECTION INTRAMUSCULAR; SUBCUTANEOUS ONCE
Status: DISCONTINUED | OUTPATIENT
Start: 2018-06-19 | End: 2018-06-19

## 2018-06-19 ASSESSMENT — ENCOUNTER SYMPTOMS
GASTROINTESTINAL NEGATIVE: 1
CHEST TIGHTNESS: 0
EYES NEGATIVE: 1
RESPIRATORY NEGATIVE: 1

## 2018-06-20 RX ORDER — CLOPIDOGREL BISULFATE 75 MG/1
75 TABLET ORAL DAILY
Qty: 90 TABLET | Refills: 0 | Status: SHIPPED | OUTPATIENT
Start: 2018-06-20 | End: 2018-09-20 | Stop reason: SDUPTHER

## 2018-06-20 RX ORDER — EZETIMIBE AND SIMVASTATIN 10; 20 MG/1; MG/1
TABLET ORAL
Qty: 90 TABLET | Refills: 0 | Status: SHIPPED | OUTPATIENT
Start: 2018-06-20 | End: 2018-09-20 | Stop reason: SDUPTHER

## 2018-07-30 RX ORDER — ESCITALOPRAM OXALATE 20 MG/1
20 TABLET ORAL DAILY
Qty: 90 TABLET | Refills: 3 | Status: SHIPPED | OUTPATIENT
Start: 2018-07-30 | End: 2018-08-28 | Stop reason: SDUPTHER

## 2018-07-30 RX ORDER — ESCITALOPRAM OXALATE 20 MG/1
20 TABLET ORAL DAILY
Qty: 30 TABLET | Refills: 3 | Status: SHIPPED | OUTPATIENT
Start: 2018-07-30 | End: 2018-07-30 | Stop reason: SDUPTHER

## 2018-08-28 ENCOUNTER — INITIAL CONSULT (OUTPATIENT)
Dept: PSYCHIATRY | Age: 73
End: 2018-08-28

## 2018-08-28 DIAGNOSIS — G31.09 FRONTO-TEMPORAL DEMENTIA (HCC): ICD-10-CM

## 2018-08-28 DIAGNOSIS — F41.9 ANXIETY DISORDER, UNSPECIFIED TYPE: ICD-10-CM

## 2018-08-28 DIAGNOSIS — F32.A DEPRESSION, UNSPECIFIED DEPRESSION TYPE: Primary | ICD-10-CM

## 2018-08-28 DIAGNOSIS — F02.80 FRONTO-TEMPORAL DEMENTIA (HCC): ICD-10-CM

## 2018-08-28 PROCEDURE — 99212 OFFICE O/P EST SF 10 MIN: CPT | Performed by: PSYCHIATRY & NEUROLOGY

## 2018-08-28 PROCEDURE — 1123F ACP DISCUSS/DSCN MKR DOCD: CPT | Performed by: PSYCHIATRY & NEUROLOGY

## 2018-08-28 PROCEDURE — 1101F PT FALLS ASSESS-DOCD LE1/YR: CPT | Performed by: PSYCHIATRY & NEUROLOGY

## 2018-08-28 PROCEDURE — 1036F TOBACCO NON-USER: CPT | Performed by: PSYCHIATRY & NEUROLOGY

## 2018-08-28 PROCEDURE — G8598 ASA/ANTIPLAT THER USED: HCPCS | Performed by: PSYCHIATRY & NEUROLOGY

## 2018-08-28 PROCEDURE — 4040F PNEUMOC VAC/ADMIN/RCVD: CPT | Performed by: PSYCHIATRY & NEUROLOGY

## 2018-08-28 PROCEDURE — 3017F COLORECTAL CA SCREEN DOC REV: CPT | Performed by: PSYCHIATRY & NEUROLOGY

## 2018-08-28 PROCEDURE — G8428 CUR MEDS NOT DOCUMENT: HCPCS | Performed by: PSYCHIATRY & NEUROLOGY

## 2018-08-28 PROCEDURE — G8417 CALC BMI ABV UP PARAM F/U: HCPCS | Performed by: PSYCHIATRY & NEUROLOGY

## 2018-08-28 RX ORDER — QUETIAPINE FUMARATE 25 MG/1
12.5 TABLET, FILM COATED ORAL NIGHTLY
Qty: 30 TABLET | Refills: 5 | Status: SHIPPED | OUTPATIENT
Start: 2018-08-28 | End: 2018-12-05

## 2018-08-28 RX ORDER — ESCITALOPRAM OXALATE 20 MG/1
20 TABLET ORAL DAILY
Qty: 90 TABLET | Refills: 5 | Status: SHIPPED | OUTPATIENT
Start: 2018-08-28 | End: 2018-12-05 | Stop reason: SDUPTHER

## 2018-08-28 ASSESSMENT — ENCOUNTER SYMPTOMS
GASTROINTESTINAL NEGATIVE: 1
RESPIRATORY NEGATIVE: 1
EYES NEGATIVE: 1

## 2018-08-28 NOTE — PROGRESS NOTES
Outpatient Psychiatric Progress Note  Catalina Hernandez M.D. Date: 8/28/2018    Total Duration of Visit: 10min    Vital Signs: There were no vitals taken for this visit. Chief Complaint/Reason For Visit:   No chief complaint on file. Interval History:  Pt came with daughter who had less perspective on pts sxs and illness. Pt calm, cooperative. 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 Tolerating meds. Pt denies falls or awareness of RLS. Mild anxiety coming to appt. Per daughter. No paranoia/ irritability. Denies depression, Sleep good, appetite good. No SI/HI. No david. MSE:     Appropriate grooming and hygiene. Appears stated age. Good eye contact. Speech spontaneous and non-pressured. Mood good  Affect congruent. TP linear  TC free of SI/HI, +ruminations and obsessions, denial, mild paranoia  Pt denies any perceptual abnormalities. The pt did not exhibit abnormal movements or tremor. The pt was not restless  The pt was alert and oriented to self, easily confused and trouble following parts of conversation  Poor insight/ poor to fair judgement        Review of Systems   Constitutional: Negative. HENT: Negative. Eyes: Negative. Respiratory: Negative. Cardiovascular: Negative. Gastrointestinal: Negative. Genitourinary: Negative. Musculoskeletal: Negative. Skin: Negative. Neurological: Negative. Endo/Heme/Allergies: Negative.           Current Medications:   Current Outpatient Prescriptions   Medication Sig Dispense Refill    escitalopram (LEXAPRO) 20 MG tablet Take 1 tablet by mouth daily 90 tablet 5    QUEtiapine (SEROQUEL) 25 MG tablet Take 0.5 tablets by mouth nightly 30 tablet 5    metoprolol tartrate (LOPRESSOR) 25 MG tablet TAKE 1 TABLET BY MOUTH TWICE DAILY 180 tablet 0    clopidogrel (PLAVIX) 75 MG tablet TAKE 1 TABLET BY MOUTH DAILY 90 tablet 0    ezetimibe-simvastatin (VYTORIN) 10-20 MG per tablet TAKE 1 TABLET BY MOUTH EVERY NIGHT 90 tablet 0    memantine (NAMENDA) 10 MG tablet Take 10 mg by mouth 2 times daily      losartan (COZAAR) 100 MG tablet TAKE 1 TABLET BY MOUTH DAILY 90 tablet 5    Avanafil (STENDRA) 200 MG TABS Take 200 mg by mouth as needed (prn) 6 tablet 3    tadalafil (CIALIS) 5 MG tablet Take 1 tablet by mouth daily 30 tablet 3    triamcinolone (NASACORT) 55 MCG/ACT nasal inhaler 2 sprays by Nasal route 2 times daily. 16.5 Inhaler 4    clindamycin (CLEOCIN) 300 MG capsule Take 1 capsule by mouth 4 times daily. 120 capsule 1    doxycycline (VIBRA-TABS) 100 MG tablet Take 100 mg by mouth 2 times daily.  Multiple Vitamins-Minerals (CENTRUM SILVER ULTRA MENS) TABS Take 1 tablet by mouth daily.  ascorbic acid (VITAMIN C) 1000 MG tablet Take 1,000 mg by mouth daily.  tadalafil (CIALIS) 10 MG tablet Take 20 mg by mouth as needed.  aspirin 81 MG EC tablet Take 81 mg by mouth daily 325mg      omega-3 acid ethyl esters (LOVAZA) 1 GM capsule Take 1 g by mouth daily. No current facility-administered medications for this visit.         Labs/Imaging/EKG:    Office Visit on 03/15/2018   Component Date Value Ref Range Status    Sodium 03/15/2018 145  136 - 145 mmol/L Final    Potassium 03/15/2018 4.4  3.5 - 5.1 mmol/L Final    Chloride 03/15/2018 104  99 - 110 mmol/L Final    CO2 03/15/2018 28  21 - 32 mmol/L Final    Anion Gap 03/15/2018 13  3 - 16 Final    Glucose 03/15/2018 103* 70 - 99 mg/dL Final    BUN 03/15/2018 15  7 - 20 mg/dL Final    CREATININE 03/15/2018 1.4* 0.8 - 1.3 mg/dL Final    GFR Non- 03/15/2018 50* >60 Final    GFR  03/15/2018 >60  >60 Final    Calcium 03/15/2018 9.0  8.3 - 10.6 mg/dL Final    Total Protein 03/15/2018 7.1  6.4 - 8.2 g/dL Final    Alb 03/15/2018 4.4  3.4 - 5.0 g/dL Final    Albumin/Globulin Ratio 03/15/2018 1.6  1.1 - 2.2 Final    Total Bilirubin 03/15/2018 0.6  0.0 - 1.0 mg/dL Final    Alkaline Phosphatase 03/15/2018 54  40

## 2018-09-20 RX ORDER — EZETIMIBE AND SIMVASTATIN 10; 20 MG/1; MG/1
TABLET ORAL
Qty: 90 TABLET | Refills: 0 | Status: SHIPPED | OUTPATIENT
Start: 2018-09-20 | End: 2018-12-05 | Stop reason: SDUPTHER

## 2018-09-20 RX ORDER — CLOPIDOGREL BISULFATE 75 MG/1
75 TABLET ORAL DAILY
Qty: 90 TABLET | Refills: 0 | Status: SHIPPED | OUTPATIENT
Start: 2018-09-20 | End: 2018-12-05 | Stop reason: SDUPTHER

## 2018-11-07 ENCOUNTER — APPOINTMENT (OUTPATIENT)
Dept: CT IMAGING | Age: 73
End: 2018-11-07
Payer: MEDICARE

## 2018-11-07 ENCOUNTER — HOSPITAL ENCOUNTER (EMERGENCY)
Age: 73
Discharge: HOME | End: 2018-11-07
Attending: EMERGENCY MEDICINE
Payer: MEDICARE

## 2018-11-07 VITALS
DIASTOLIC BLOOD PRESSURE: 98 MMHG | SYSTOLIC BLOOD PRESSURE: 163 MMHG | RESPIRATION RATE: 18 BRPM | TEMPERATURE: 97.9 F | OXYGEN SATURATION: 97 % | HEART RATE: 61 BPM

## 2018-11-07 DIAGNOSIS — S01.511A LIP LACERATION, INITIAL ENCOUNTER: Primary | ICD-10-CM
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
## 2018-11-07 PROCEDURE — 6370000000 HC RX 637 (ALT 250 FOR IP): Performed by: PHYSICIAN ASSISTANT

## 2018-11-07 PROCEDURE — 70450 CT HEAD/BRAIN W/O DYE: CPT

## 2018-11-07 PROCEDURE — 2500000003 HC RX 250 WO HCPCS: Performed by: PHYSICIAN ASSISTANT

## 2018-11-07 PROCEDURE — 6360000002 HC RX W HCPCS: Performed by: PHYSICIAN ASSISTANT

## 2018-11-07 PROCEDURE — 72125 CT NECK SPINE W/O DYE: CPT

## 2018-11-07 PROCEDURE — 90715 TDAP VACCINE 7 YRS/> IM: CPT | Performed by: PHYSICIAN ASSISTANT

## 2018-11-07 PROCEDURE — 4500000024 HC ED LEVEL 4 PROCEDURE

## 2018-11-07 PROCEDURE — 99284 EMERGENCY DEPT VISIT MOD MDM: CPT

## 2018-11-07 PROCEDURE — 90471 IMMUNIZATION ADMIN: CPT | Performed by: PHYSICIAN ASSISTANT

## 2018-11-07 RX ORDER — AMOXICILLIN AND CLAVULANATE POTASSIUM 875; 125 MG/1; MG/1
1 TABLET, FILM COATED ORAL ONCE
Status: COMPLETED | OUTPATIENT
Start: 2018-11-07 | End: 2018-11-07

## 2018-11-07 RX ORDER — AMOXICILLIN AND CLAVULANATE POTASSIUM 875; 125 MG/1; MG/1
1 TABLET, FILM COATED ORAL 2 TIMES DAILY
Qty: 14 TABLET | Refills: 0 | Status: SHIPPED | OUTPATIENT
Start: 2018-11-07 | End: 2018-11-14

## 2018-11-07 RX ORDER — LIDOCAINE HYDROCHLORIDE AND EPINEPHRINE 10; 10 MG/ML; UG/ML
20 INJECTION, SOLUTION INFILTRATION; PERINEURAL ONCE
Status: COMPLETED | OUTPATIENT
Start: 2018-11-07 | End: 2018-11-07

## 2018-11-07 RX ADMIN — TETANUS TOXOID, REDUCED DIPHTHERIA TOXOID AND ACELLULAR PERTUSSIS VACCINE, ADSORBED 0.5 ML: 5; 2.5; 8; 8; 2.5 SUSPENSION INTRAMUSCULAR at 01:19

## 2018-11-07 RX ADMIN — LIDOCAINE HYDROCHLORIDE,EPINEPHRINE BITARTRATE 20 ML: 10; .01 INJECTION, SOLUTION INFILTRATION; PERINEURAL at 01:18

## 2018-11-07 RX ADMIN — AMOXICILLIN AND CLAVULANATE POTASSIUM 1 TABLET: 875; 125 TABLET, FILM COATED ORAL at 01:19

## 2018-11-07 ASSESSMENT — ENCOUNTER SYMPTOMS
SORE THROAT: 0
COLOR CHANGE: 0
ABDOMINAL DISTENTION: 0
SHORTNESS OF BREATH: 0
RHINORRHEA: 0
DIARRHEA: 0
EYE PAIN: 0
TROUBLE SWALLOWING: 0
NAUSEA: 0
ABDOMINAL PAIN: 0
CHEST TIGHTNESS: 0
COUGH: 0
VOMITING: 0
WHEEZING: 0

## 2018-11-07 ASSESSMENT — PAIN SCALES - GENERAL: PAINLEVEL_OUTOF10: 3

## 2018-11-07 ASSESSMENT — PAIN DESCRIPTION - LOCATION: LOCATION: MOUTH

## 2018-11-07 ASSESSMENT — PAIN DESCRIPTION - ORIENTATION: ORIENTATION: UPPER

## 2018-11-07 NOTE — ED PROVIDER NOTES
[facility]  EMERGENCY DEPARTMENT ENCOUNTER          PHYSICIAN ASSISTANT NOTE       Date of evaluation: 11/7/2018    Chief Complaint     Fall      History of Present Illness     Tammie Garcia is a 67 y.o. male with a past medical history as noted below who presents to the Emergency Department with a complaint of a lip laceration after a fall. The patient reports he rolled out of bed while sleeping tonight. He reports he was awoken by the fall. He reports he cut his lip as a result of the fall. He was able to get up immediately after the fall. Denies any head or neck pain after the fall. No lightheadedness or dizziness. No confusion or other neurological deficit. His pain is reports as 3/10 on the pain scale. He has not taken anything for his symptoms. The patient is on Plavix. Review of Systems     Review of Systems   Constitutional: Negative for chills, diaphoresis, fatigue and fever. HENT: Negative for congestion, postnasal drip, rhinorrhea, sore throat and trouble swallowing. Eyes: Negative for pain and visual disturbance. Respiratory: Negative for cough, chest tightness, shortness of breath and wheezing. Cardiovascular: Negative for chest pain, palpitations and leg swelling. Gastrointestinal: Negative for abdominal distention, abdominal pain, diarrhea, nausea and vomiting. Endocrine: Negative for polydipsia and polyuria. Genitourinary: Negative for dysuria. Musculoskeletal: Negative for myalgias, neck pain and neck stiffness. Skin: Positive for wound. Negative for color change, pallor and rash. Neurological: Negative for dizziness, weakness, light-headedness and headaches. Hematological: Does not bruise/bleed easily. Psychiatric/Behavioral: Negative for confusion, hallucinations, self-injury and suicidal ideas. All other systems reviewed and are negative.       Past Medical, Surgical, Family, and Social History     He has a past medical history of Anxiety; BPH; CAD

## 2018-12-05 ENCOUNTER — OFFICE VISIT (OUTPATIENT)
Dept: INTERNAL MEDICINE CLINIC | Age: 73
End: 2018-12-05
Payer: MEDICARE

## 2018-12-05 VITALS
OXYGEN SATURATION: 98 % | WEIGHT: 221 LBS | SYSTOLIC BLOOD PRESSURE: 126 MMHG | HEART RATE: 53 BPM | HEIGHT: 74 IN | BODY MASS INDEX: 28.36 KG/M2 | DIASTOLIC BLOOD PRESSURE: 86 MMHG

## 2018-12-05 DIAGNOSIS — I10 ESSENTIAL HYPERTENSION: ICD-10-CM

## 2018-12-05 DIAGNOSIS — C61 PROSTATE CANCER (HCC): Primary | ICD-10-CM

## 2018-12-05 DIAGNOSIS — F02.80 FRONTO-TEMPORAL DEMENTIA (HCC): ICD-10-CM

## 2018-12-05 DIAGNOSIS — M89.8X9 BONE PAIN: ICD-10-CM

## 2018-12-05 DIAGNOSIS — I25.10 CORONARY ARTERY DISEASE INVOLVING NATIVE HEART WITHOUT ANGINA PECTORIS, UNSPECIFIED VESSEL OR LESION TYPE: ICD-10-CM

## 2018-12-05 DIAGNOSIS — D50.9 IRON DEFICIENCY ANEMIA, UNSPECIFIED IRON DEFICIENCY ANEMIA TYPE: ICD-10-CM

## 2018-12-05 DIAGNOSIS — G31.09 FRONTO-TEMPORAL DEMENTIA (HCC): ICD-10-CM

## 2018-12-05 DIAGNOSIS — E78.2 MIXED HYPERLIPIDEMIA: ICD-10-CM

## 2018-12-05 DIAGNOSIS — F01.50 VASCULAR DEMENTIA WITHOUT BEHAVIORAL DISTURBANCE (HCC): ICD-10-CM

## 2018-12-05 LAB
A/G RATIO: 2 (ref 1.1–2.2)
ALBUMIN SERPL-MCNC: 4.6 G/DL (ref 3.4–5)
ALP BLD-CCNC: 55 U/L (ref 40–129)
ALT SERPL-CCNC: 39 U/L (ref 10–40)
ANION GAP SERPL CALCULATED.3IONS-SCNC: 12 MMOL/L (ref 3–16)
AST SERPL-CCNC: 32 U/L (ref 15–37)
BILIRUB SERPL-MCNC: 0.7 MG/DL (ref 0–1)
BUN BLDV-MCNC: 13 MG/DL (ref 7–20)
CALCIUM SERPL-MCNC: 9.1 MG/DL (ref 8.3–10.6)
CHLORIDE BLD-SCNC: 105 MMOL/L (ref 99–110)
CHOLESTEROL, TOTAL: 137 MG/DL (ref 0–199)
CO2: 26 MMOL/L (ref 21–32)
CREAT SERPL-MCNC: 1.6 MG/DL (ref 0.8–1.3)
FOLATE: >20 NG/ML (ref 4.78–24.2)
GFR AFRICAN AMERICAN: 51
GFR NON-AFRICAN AMERICAN: 42
GLOBULIN: 2.3 G/DL
GLUCOSE BLD-MCNC: 106 MG/DL (ref 70–99)
HDLC SERPL-MCNC: 54 MG/DL (ref 40–60)
LDL CHOLESTEROL CALCULATED: 72 MG/DL
POTASSIUM SERPL-SCNC: 4.8 MMOL/L (ref 3.5–5.1)
SODIUM BLD-SCNC: 143 MMOL/L (ref 136–145)
TOTAL PROTEIN: 6.9 G/DL (ref 6.4–8.2)
TRIGL SERPL-MCNC: 57 MG/DL (ref 0–150)
VITAMIN B-12: 1259 PG/ML (ref 211–911)
VLDLC SERPL CALC-MCNC: 11 MG/DL

## 2018-12-05 PROCEDURE — G8417 CALC BMI ABV UP PARAM F/U: HCPCS | Performed by: INTERNAL MEDICINE

## 2018-12-05 PROCEDURE — G8427 DOCREV CUR MEDS BY ELIG CLIN: HCPCS | Performed by: INTERNAL MEDICINE

## 2018-12-05 PROCEDURE — 1036F TOBACCO NON-USER: CPT | Performed by: INTERNAL MEDICINE

## 2018-12-05 PROCEDURE — G8484 FLU IMMUNIZE NO ADMIN: HCPCS | Performed by: INTERNAL MEDICINE

## 2018-12-05 PROCEDURE — 1101F PT FALLS ASSESS-DOCD LE1/YR: CPT | Performed by: INTERNAL MEDICINE

## 2018-12-05 PROCEDURE — 99214 OFFICE O/P EST MOD 30 MIN: CPT | Performed by: INTERNAL MEDICINE

## 2018-12-05 PROCEDURE — 3017F COLORECTAL CA SCREEN DOC REV: CPT | Performed by: INTERNAL MEDICINE

## 2018-12-05 PROCEDURE — 1123F ACP DISCUSS/DSCN MKR DOCD: CPT | Performed by: INTERNAL MEDICINE

## 2018-12-05 PROCEDURE — 4040F PNEUMOC VAC/ADMIN/RCVD: CPT | Performed by: INTERNAL MEDICINE

## 2018-12-05 PROCEDURE — G8598 ASA/ANTIPLAT THER USED: HCPCS | Performed by: INTERNAL MEDICINE

## 2018-12-05 RX ORDER — CLOPIDOGREL BISULFATE 75 MG/1
75 TABLET ORAL DAILY
Qty: 90 TABLET | Refills: 3 | Status: SHIPPED | OUTPATIENT
Start: 2018-12-05 | End: 2019-01-01 | Stop reason: SDUPTHER

## 2018-12-05 RX ORDER — EZETIMIBE AND SIMVASTATIN 10; 20 MG/1; MG/1
1 TABLET ORAL NIGHTLY
Qty: 90 TABLET | Refills: 0 | Status: SHIPPED | OUTPATIENT
Start: 2018-12-05 | End: 2019-03-04 | Stop reason: SDUPTHER

## 2018-12-05 RX ORDER — ESCITALOPRAM OXALATE 20 MG/1
20 TABLET ORAL DAILY
Qty: 90 TABLET | Refills: 5 | Status: SHIPPED | OUTPATIENT
Start: 2018-12-05 | End: 2019-01-01 | Stop reason: SDUPTHER

## 2018-12-05 RX ORDER — QUETIAPINE FUMARATE 25 MG/1
12.5 TABLET, FILM COATED ORAL NIGHTLY
Qty: 90 TABLET | Refills: 5 | Status: SHIPPED | OUTPATIENT
Start: 2018-12-05 | End: 2019-03-25 | Stop reason: SDUPTHER

## 2018-12-05 ASSESSMENT — PATIENT HEALTH QUESTIONNAIRE - PHQ9
SUM OF ALL RESPONSES TO PHQ QUESTIONS 1-9: 0
2. FEELING DOWN, DEPRESSED OR HOPELESS: 0
SUM OF ALL RESPONSES TO PHQ9 QUESTIONS 1 & 2: 0
1. LITTLE INTEREST OR PLEASURE IN DOING THINGS: 0
SUM OF ALL RESPONSES TO PHQ QUESTIONS 1-9: 0

## 2018-12-05 ASSESSMENT — ENCOUNTER SYMPTOMS
EYES NEGATIVE: 1
GASTROINTESTINAL NEGATIVE: 1
RESPIRATORY NEGATIVE: 1

## 2018-12-18 RX ORDER — CLOPIDOGREL BISULFATE 75 MG/1
75 TABLET ORAL DAILY
Qty: 90 TABLET | Refills: 0 | Status: SHIPPED | OUTPATIENT
Start: 2018-12-18 | End: 2019-03-13 | Stop reason: SDUPTHER

## 2019-01-01 ENCOUNTER — NURSE TRIAGE (OUTPATIENT)
Dept: OTHER | Facility: CLINIC | Age: 74
End: 2019-01-01

## 2019-01-01 ENCOUNTER — OFFICE VISIT (OUTPATIENT)
Dept: INTERNAL MEDICINE CLINIC | Age: 74
End: 2019-01-01
Payer: MEDICARE

## 2019-01-01 ENCOUNTER — TELEPHONE (OUTPATIENT)
Dept: INTERNAL MEDICINE CLINIC | Age: 74
End: 2019-01-01

## 2019-01-01 VITALS
DIASTOLIC BLOOD PRESSURE: 75 MMHG | OXYGEN SATURATION: 98 % | HEART RATE: 84 BPM | TEMPERATURE: 97.9 F | WEIGHT: 219.3 LBS | SYSTOLIC BLOOD PRESSURE: 156 MMHG | BODY MASS INDEX: 28.14 KG/M2 | RESPIRATION RATE: 16 BRPM | HEIGHT: 74 IN

## 2019-01-01 VITALS
HEART RATE: 60 BPM | HEIGHT: 74 IN | WEIGHT: 215.4 LBS | BODY MASS INDEX: 27.64 KG/M2 | TEMPERATURE: 97.6 F | DIASTOLIC BLOOD PRESSURE: 66 MMHG | SYSTOLIC BLOOD PRESSURE: 102 MMHG

## 2019-01-01 DIAGNOSIS — C61 PROSTATE CANCER (HCC): ICD-10-CM

## 2019-01-01 DIAGNOSIS — F41.1 GENERALIZED ANXIETY DISORDER: Primary | ICD-10-CM

## 2019-01-01 DIAGNOSIS — I87.8 VENOUS STASIS: ICD-10-CM

## 2019-01-01 DIAGNOSIS — I10 ESSENTIAL HYPERTENSION: ICD-10-CM

## 2019-01-01 DIAGNOSIS — I10 ESSENTIAL HYPERTENSION: Primary | ICD-10-CM

## 2019-01-01 DIAGNOSIS — E11.9 TYPE 2 DIABETES MELLITUS WITHOUT COMPLICATION, WITHOUT LONG-TERM CURRENT USE OF INSULIN (HCC): ICD-10-CM

## 2019-01-01 LAB
CHP ED QC CHECK: NORMAL
GLUCOSE BLD-MCNC: 118 MG/DL
HBA1C MFR BLD: 6 %
PROSTATE SPECIFIC ANTIGEN: 0.49 NG/ML (ref 0–4)

## 2019-01-01 PROCEDURE — 1123F ACP DISCUSS/DSCN MKR DOCD: CPT | Performed by: INTERNAL MEDICINE

## 2019-01-01 PROCEDURE — G8417 CALC BMI ABV UP PARAM F/U: HCPCS | Performed by: INTERNAL MEDICINE

## 2019-01-01 PROCEDURE — 99213 OFFICE O/P EST LOW 20 MIN: CPT | Performed by: INTERNAL MEDICINE

## 2019-01-01 PROCEDURE — 2022F DILAT RTA XM EVC RTNOPTHY: CPT | Performed by: INTERNAL MEDICINE

## 2019-01-01 PROCEDURE — G8598 ASA/ANTIPLAT THER USED: HCPCS | Performed by: INTERNAL MEDICINE

## 2019-01-01 PROCEDURE — G8427 DOCREV CUR MEDS BY ELIG CLIN: HCPCS | Performed by: INTERNAL MEDICINE

## 2019-01-01 PROCEDURE — 82962 GLUCOSE BLOOD TEST: CPT | Performed by: INTERNAL MEDICINE

## 2019-01-01 PROCEDURE — 83036 HEMOGLOBIN GLYCOSYLATED A1C: CPT | Performed by: INTERNAL MEDICINE

## 2019-01-01 PROCEDURE — 1036F TOBACCO NON-USER: CPT | Performed by: INTERNAL MEDICINE

## 2019-01-01 PROCEDURE — 4040F PNEUMOC VAC/ADMIN/RCVD: CPT | Performed by: INTERNAL MEDICINE

## 2019-01-01 PROCEDURE — 99214 OFFICE O/P EST MOD 30 MIN: CPT | Performed by: INTERNAL MEDICINE

## 2019-01-01 PROCEDURE — 3017F COLORECTAL CA SCREEN DOC REV: CPT | Performed by: INTERNAL MEDICINE

## 2019-01-01 PROCEDURE — 3044F HG A1C LEVEL LT 7.0%: CPT | Performed by: INTERNAL MEDICINE

## 2019-01-01 RX ORDER — CLOPIDOGREL BISULFATE 75 MG/1
75 TABLET ORAL DAILY
Qty: 90 TABLET | Refills: 0 | Status: SHIPPED | OUTPATIENT
Start: 2019-01-01 | End: 2020-01-01 | Stop reason: SDUPTHER

## 2019-01-01 RX ORDER — LORAZEPAM 0.5 MG/1
0.5 TABLET ORAL EVERY 8 HOURS PRN
Qty: 100 TABLET | Refills: 2 | Status: SHIPPED | OUTPATIENT
Start: 2019-01-01 | End: 2019-01-01

## 2019-01-01 RX ORDER — LOSARTAN POTASSIUM 100 MG/1
100 TABLET ORAL DAILY
Qty: 90 TABLET | Refills: 5 | Status: ON HOLD | OUTPATIENT
Start: 2019-01-01 | End: 2020-01-01 | Stop reason: HOSPADM

## 2019-01-01 RX ORDER — ESCITALOPRAM OXALATE 20 MG/1
20 TABLET ORAL DAILY
Qty: 90 TABLET | Refills: 5 | Status: SHIPPED | OUTPATIENT
Start: 2019-01-01

## 2019-01-01 RX ORDER — EZETIMIBE AND SIMVASTATIN 10; 20 MG/1; MG/1
TABLET ORAL
Qty: 90 TABLET | Refills: 0 | Status: SHIPPED | OUTPATIENT
Start: 2019-01-01 | End: 2019-01-01 | Stop reason: SDUPTHER

## 2019-01-01 ASSESSMENT — ENCOUNTER SYMPTOMS
EYES NEGATIVE: 1
GASTROINTESTINAL NEGATIVE: 1
ABDOMINAL PAIN: 0
CHEST TIGHTNESS: 0
COUGH: 0
SHORTNESS OF BREATH: 0
RESPIRATORY NEGATIVE: 1

## 2019-01-01 ASSESSMENT — PATIENT HEALTH QUESTIONNAIRE - PHQ9
1. LITTLE INTEREST OR PLEASURE IN DOING THINGS: 0
SUM OF ALL RESPONSES TO PHQ9 QUESTIONS 1 & 2: 0
SUM OF ALL RESPONSES TO PHQ QUESTIONS 1-9: 0
SUM OF ALL RESPONSES TO PHQ QUESTIONS 1-9: 0
2. FEELING DOWN, DEPRESSED OR HOPELESS: 0

## 2019-03-06 RX ORDER — EZETIMIBE AND SIMVASTATIN 10; 20 MG/1; MG/1
TABLET ORAL
Qty: 90 TABLET | Refills: 0 | Status: SHIPPED | OUTPATIENT
Start: 2019-03-06

## 2019-03-13 ENCOUNTER — OFFICE VISIT (OUTPATIENT)
Dept: INTERNAL MEDICINE CLINIC | Age: 74
End: 2019-03-13
Payer: MEDICARE

## 2019-03-13 VITALS
OXYGEN SATURATION: 98 % | BODY MASS INDEX: 28.23 KG/M2 | SYSTOLIC BLOOD PRESSURE: 130 MMHG | HEART RATE: 63 BPM | HEIGHT: 74 IN | WEIGHT: 220 LBS | DIASTOLIC BLOOD PRESSURE: 80 MMHG

## 2019-03-13 DIAGNOSIS — I10 ESSENTIAL HYPERTENSION: Primary | ICD-10-CM

## 2019-03-13 DIAGNOSIS — E78.2 MIXED HYPERLIPIDEMIA: ICD-10-CM

## 2019-03-13 PROCEDURE — G8417 CALC BMI ABV UP PARAM F/U: HCPCS | Performed by: INTERNAL MEDICINE

## 2019-03-13 PROCEDURE — 1123F ACP DISCUSS/DSCN MKR DOCD: CPT | Performed by: INTERNAL MEDICINE

## 2019-03-13 PROCEDURE — 3017F COLORECTAL CA SCREEN DOC REV: CPT | Performed by: INTERNAL MEDICINE

## 2019-03-13 PROCEDURE — G8484 FLU IMMUNIZE NO ADMIN: HCPCS | Performed by: INTERNAL MEDICINE

## 2019-03-13 PROCEDURE — G8427 DOCREV CUR MEDS BY ELIG CLIN: HCPCS | Performed by: INTERNAL MEDICINE

## 2019-03-13 PROCEDURE — 1036F TOBACCO NON-USER: CPT | Performed by: INTERNAL MEDICINE

## 2019-03-13 PROCEDURE — G8598 ASA/ANTIPLAT THER USED: HCPCS | Performed by: INTERNAL MEDICINE

## 2019-03-13 PROCEDURE — 99214 OFFICE O/P EST MOD 30 MIN: CPT | Performed by: INTERNAL MEDICINE

## 2019-03-13 PROCEDURE — 4040F PNEUMOC VAC/ADMIN/RCVD: CPT | Performed by: INTERNAL MEDICINE

## 2019-03-13 ASSESSMENT — ENCOUNTER SYMPTOMS
GASTROINTESTINAL NEGATIVE: 1
RESPIRATORY NEGATIVE: 1
EYES NEGATIVE: 1

## 2019-03-13 ASSESSMENT — PATIENT HEALTH QUESTIONNAIRE - PHQ9
1. LITTLE INTEREST OR PLEASURE IN DOING THINGS: 0
SUM OF ALL RESPONSES TO PHQ9 QUESTIONS 1 & 2: 0
2. FEELING DOWN, DEPRESSED OR HOPELESS: 0
SUM OF ALL RESPONSES TO PHQ QUESTIONS 1-9: 0
SUM OF ALL RESPONSES TO PHQ QUESTIONS 1-9: 0

## 2019-03-25 DIAGNOSIS — G31.09 FRONTO-TEMPORAL DEMENTIA (HCC): ICD-10-CM

## 2019-03-25 DIAGNOSIS — F02.80 FRONTO-TEMPORAL DEMENTIA (HCC): ICD-10-CM

## 2019-03-25 RX ORDER — QUETIAPINE FUMARATE 25 MG/1
25 TABLET, FILM COATED ORAL NIGHTLY
Qty: 90 TABLET | Refills: 5 | Status: ON HOLD | OUTPATIENT
Start: 2019-03-25 | End: 2020-01-01 | Stop reason: HOSPADM

## 2019-04-29 DIAGNOSIS — I10 ESSENTIAL HYPERTENSION: ICD-10-CM

## 2019-05-01 RX ORDER — LOSARTAN POTASSIUM 100 MG/1
100 TABLET ORAL DAILY
Qty: 90 TABLET | Refills: 0 | Status: SHIPPED | OUTPATIENT
Start: 2019-05-01 | End: 2019-01-01 | Stop reason: SDUPTHER

## 2019-05-31 PROBLEM — I87.8 VENOUS STASIS: Status: ACTIVE | Noted: 2019-01-01

## 2019-05-31 NOTE — PATIENT INSTRUCTIONS
Before any of you medication is completely gone, call your pharmacy AT LEAST 1 WEEK ahead for refills. Review all information regarding your medication before starting. If you become ill when the clinic is closed, please call the [facility] [employer] [employer]  at   #[phone] and ask the  to page the AOD between 6:00 AM and 6:00 PM or page the AON between 6:00 PM and 6:00 am    The clinic is not able to process MY CHART requests for appointments or messages including requests. Please call the [address] [place] [address] at [phone]  For appointments and messages. Call your pharmacy for medication refills. Followup with Dr.Melvin Sabine dixon ordered.     Continue medication as listed on discharge sheet    Instructions reviewed before discharge and copy given to patient    [address] [phone]

## 2019-05-31 NOTE — PROGRESS NOTES
[facility]       NURSING PROGRESS NOTE      May 31, 2019  Jose Maria Moss    Chief Complaint:   Chief Complaint   Patient presents with    Foot Swelling     pedal edema intermittently onset past 2 weeks       Constitutional: Alert and oriented; calm; doesn't remember foot edema as reported by wife  Eyes: negative  Ears, nose, mouth, throat, and face: negative  Respiratory: negative  denies episodes of shortness of breath  Cardiovascular: no edema now; wife states foot swelling is new and onset past 2 weeks and edema is intermittent  Gastrointestinal: negative  Genitourinary: negative  Integument/breast: negative  Hematologic/lymphatic: negative  Musculoskeletal: negative  Neurological: dementia  Diabetes: No      Pain Assessment:  Pain Present: no  Pain Score: 0  Pain Quality/Description: no c/o pain    Mobility/Safety/Self-Care:  Steady Gait: Yes  History of Falls: Yes  after tripping  History of a Fall within the last 30 days No  Assistive Device: None  Poor Hygiene: No    Psychosocial:   Depression: No  If YES,    Does Patient express current thoughts of harming self or others?: No  Anxious: No  Insomnia: No  Inappropriate Behavior: No  Alcohol Abuse: no  Substance Abuse: no  Signs of Physical Abuse: No  Signs of Emotional Abuse: No    Educational:  Identify the learner who is being assessed for education:  patient                    Ability to Learn:  Exhibits ability to grasp concepts and respond to questions: Medium  Ready to Learn: Yes  calm   Preferred Method of Learning:  written  Barriers to Learning: Verbalizes interest and Family willing to learn  Special Considerations due to cultural, [place]ondoism, spiritual beliefs:  No  Language:  English  :  No    Note:   unable to schedule with PCP Jerrell Britt      9:19 AM 5/31/2019

## 2019-05-31 NOTE — PROGRESS NOTES
Department Of Internal Medicine  General Medicine/Primary Care  Established Patient Visit    Patient:  Denae Reeves                                               : 1945  Age: 75 y.o. MRN: [id number]  Date : 2019    History Obtained From:  patient    REASON FOR VISIT:  Ankle Swelling x 2 weeks    HISTORY OF PRESENT ILLNESS:   The patient is a 75 y.o. male who presents for swelling (off and on) for past several weeks. Denied any SOB, palpitations, chest pain, or coughing, denied any leg pain. Hx of CAD s/p Angioplasy with stent placement . Dr. Darrel Pappas cardiologist.  No prior hx of blood clots. Abulates w/o difficulty. No cane or walker. Exercises regularly. Past Medical History:        Diagnosis Date    Anxiety     BPH     CAD (coronary artery disease)     Depression     Hyperlipidemia     Hypertension     Sinusitis     Vertigo        Past Surgical History:        Procedure Laterality Date    ANGIOPLASTY         Family History:       Problem Relation Age of Onset    High Blood Pressure Mother     High Cholesterol Mother     Diabetes Father     Heart Disease Father     Stroke Father     Stroke Maternal Grandmother     Diabetes Paternal Grandmother     Stroke Paternal Grandmother        Social History:   TOBACCO:   reports that he has never smoked. He has never used smokeless tobacco.  ETOH:   reports that he does not drink alcohol. OCCUPATION:      Allergies:  Patient has no known allergies. Current Medications:    Prior to Admission medications    Medication Sig Start Date End Date Taking?  Authorizing Provider   ezetimibe-simvastatin (VYTORIN) 10-20 MG per tablet TAKE 1 TABLET BY MOUTH EVERY NIGHT 19   C Shelli Orr MD   losartan (COZAAR) 100 MG tablet TAKE 1 TABLET BY MOUTH DAILY 19   C Shelli Orr MD   QUEtiapine (SEROQUEL) 25 MG tablet Take 1 tablet by mouth nightly 3/25/19   C Shelli Orr MD   ezetimibe-simvastatin (VYTORIN) 10-20 MG per tablet TAKE 1 TABLET BY MOUTH EVERY NIGHT 3/6/19   ROZ Prescott MD   escitalopram (LEXAPRO) 20 MG tablet Take 1 tablet by mouth daily 12/5/18   ROZ Prescott MD   clopidogrel (PLAVIX) 75 MG tablet Take 1 tablet by mouth daily 12/5/18   ROZ Prescott MD   memantine (NAMENDA) 10 MG tablet Take 10 mg by mouth 2 times daily    Historical Provider, MD   triamcinolone (NASACORT) 55 MCG/ACT nasal inhaler 2 sprays by Nasal route 2 times daily. 3/25/14   ROZ Prescott MD   Multiple Vitamins-Minerals (CENTRUM SILVER ULTRA MENS) TABS Take 1 tablet by mouth daily. Historical Provider, MD   ascorbic acid (VITAMIN C) 1000 MG tablet Take 1,000 mg by mouth daily. Historical Provider, MD   aspirin 81 MG EC tablet Take 81 mg by mouth daily 325mg    Historical Provider, MD   omega-3 acid ethyl esters (LOVAZA) 1 GM capsule Take 1 g by mouth daily. 8/6/10   Historical Provider, MD       Review of Systems   Constitutional: Negative for chills and fever. Respiratory: Negative for cough, chest tightness and shortness of breath. Cardiovascular: Positive for leg swelling. Negative for chest pain and palpitations. Gastrointestinal: Negative for abdominal pain. Physical Exam:      Vitals: There were no vitals taken for this visit. There is no height or weight on file to calculate BMI. Wt Readings from Last 3 Encounters:   03/13/19 220 lb (99.8 kg)   12/05/18 221 lb (100.2 kg)   06/19/18 221 lb (100.2 kg)       Physical Exam   Constitutional: He appears well-developed and well-nourished. HENT:   Head: Normocephalic and atraumatic. Eyes: Pupils are equal, round, and reactive to light. EOM are normal.   Cardiovascular: Normal rate, regular rhythm and normal heart sounds. Exam reveals no gallop and no friction rub. No murmur heard. Pulmonary/Chest: Effort normal and breath sounds normal. No respiratory distress. He has no wheezes. He has no rales. He exhibits no tenderness. Abdominal: Soft.  Bowel sounds are normal.   Musculoskeletal:   Mild ankle edema (+1), no calf tenderness, good peripheral pulses, good skin color/hair growth       LABS:    Chemistry:  No results for input(s): BUN, CREATININE, NA, K, CO2, CL, MG, PHOS, AST, ALT, ALB, PROT in the last 72 hours. Invalid input(s): GLU, CA, TBILI, DBILI, ALP, GLUFASTING    No results for input(s): ALKPHOS, ALT, AST, PROT, BILITOT, BILIDIR, LABALBU in the last 72 hours. [unfilled]    No results for input(s): Leticia Silverio, LABMICR, MICROALBUR, Commerce Kugel in the last 72 hours. Lab Results   Component Value Date    TSHFT4 3.02 03/29/2016       Hematology:  No results for input(s): WBC, HGB, HCT, PLT, MCV, MCH, MCHC, RDW, EOSABS in the last 72 hours. Invalid input(s): NEUTP, LYMPHP, MONOSP, EOSP, BASOP, NEUTABS, LYMPHABS, MONOABS, BASOABS    Lab Results   Component Value Date    FOLATE >20.00 12/05/2018    WYHOYMVW20 1259 (H) 12/05/2018       Lipid:  Lab Results   Component Value Date    CHOL 137 12/05/2018    HDL 54 12/05/2018    LDLCALC 72 12/05/2018    TRIG 57 12/05/2018       U/A:  Lab Results   Component Value Date    LABMICR YES 05/02/2017       Imaging:   No results found. EKG:   Health Maintenance   Pap Smear: NA   Flex Sig/Colonoscopy:    Mammography: NA   DEXAScan: NA   PSA:    INFLUENZA: 2014   PNEUMONIA VACCINE (Age 71): Prevnar (5/13/17) and Pneumovax (1/7/13)   Tdap/Td (1 dose Tdap, Td every 10 years): 11/2018   Zoster: 4/2014   Hepatitis A/B:          Assessment/Plan:     # Bilateral Ankle Edema (Venous Stasis--No varicosities)  No known hx of HF, DVT/PE. Compliant with low salt diet. Ambulates w/o difficulty. Educated on compression stockings. Prescription provided. Educated on placement when edematous throughout day and take them off at night. Elevate legs at night with pillows.     - Compression stockings 15-20 mmHg provided  - Adherence to low salt diet  - Exercise daily  - Prop legs at night   - No need for diuretics, no need for lab workup    # Essential HTN   - Losartan 100 mg PO qD    # HLD  - Vytorin 10-20 mg PO qD    # CAD  - ASA 81 mg PO qD  - Plavix 75 mg PO qD    # Depression/Anxiety  - Lexapro 20 mg PO qD    # Memory Impairment   - Memantine 10 mg PO BID    # Pre-Diabetes  Last HbA1c 6.2    # Hx of Prostate Cancer  PSA 7.57 (2015)    Case will be discussed with preceptor.      Jose Fink  Internal Medicine Resident  Pager: [phone]   5/31/2019, 8:45 AM

## 2020-01-01 ENCOUNTER — CARE COORDINATION (OUTPATIENT)
Dept: CASE MANAGEMENT | Age: 75
End: 2020-01-01

## 2020-01-01 ENCOUNTER — APPOINTMENT (OUTPATIENT)
Dept: CT IMAGING | Age: 75
End: 2020-01-01
Payer: MEDICARE

## 2020-01-01 ENCOUNTER — HOSPITAL ENCOUNTER (EMERGENCY)
Age: 75
Discharge: HOME | End: 2020-03-09
Attending: EMERGENCY MEDICINE
Payer: MEDICARE

## 2020-01-01 ENCOUNTER — APPOINTMENT (OUTPATIENT)
Dept: GENERAL RADIOLOGY | Age: 75
DRG: 619 | End: 2020-01-01
Payer: MEDICARE

## 2020-01-01 ENCOUNTER — APPOINTMENT (OUTPATIENT)
Dept: CT IMAGING | Age: 75
DRG: 619 | End: 2020-01-01
Payer: MEDICARE

## 2020-01-01 ENCOUNTER — TELEPHONE (OUTPATIENT)
Dept: BARIATRICS/WEIGHT MGMT | Age: 75
End: 2020-01-01

## 2020-01-01 ENCOUNTER — OFFICE VISIT (OUTPATIENT)
Dept: ENT CLINIC | Age: 75
End: 2020-01-01
Payer: MEDICARE

## 2020-01-01 ENCOUNTER — ANESTHESIA (OUTPATIENT)
Dept: OPERATING ROOM | Age: 75
DRG: 619 | End: 2020-01-01
Payer: MEDICARE

## 2020-01-01 ENCOUNTER — APPOINTMENT (OUTPATIENT)
Dept: GENERAL RADIOLOGY | Age: 75
DRG: 091 | End: 2020-01-01
Payer: MEDICARE

## 2020-01-01 ENCOUNTER — TELEPHONE (OUTPATIENT)
Dept: INTERNAL MEDICINE CLINIC | Age: 75
End: 2020-01-01

## 2020-01-01 ENCOUNTER — ANESTHESIA EVENT (OUTPATIENT)
Dept: OPERATING ROOM | Age: 75
DRG: 619 | End: 2020-01-01
Payer: MEDICARE

## 2020-01-01 ENCOUNTER — OFFICE VISIT (OUTPATIENT)
Dept: INTERNAL MEDICINE CLINIC | Age: 75
End: 2020-01-01
Payer: MEDICARE

## 2020-01-01 ENCOUNTER — APPOINTMENT (OUTPATIENT)
Dept: CT IMAGING | Age: 75
DRG: 091 | End: 2020-01-01
Payer: MEDICARE

## 2020-01-01 ENCOUNTER — OFFICE VISIT (OUTPATIENT)
Dept: CARDIOLOGY CLINIC | Age: 75
End: 2020-01-01
Payer: MEDICARE

## 2020-01-01 ENCOUNTER — HOSPITAL ENCOUNTER (INPATIENT)
Age: 75
LOS: 9 days | Discharge: HOME HEALTH | DRG: 619 | End: 2020-05-06
Attending: EMERGENCY MEDICINE | Admitting: INTERNAL MEDICINE
Payer: MEDICARE

## 2020-01-01 ENCOUNTER — HOSPITAL ENCOUNTER (EMERGENCY)
Age: 75
Discharge: HOME | End: 2020-03-18
Attending: EMERGENCY MEDICINE
Payer: MEDICARE

## 2020-01-01 ENCOUNTER — HOSPITAL ENCOUNTER (INPATIENT)
Age: 75
LOS: 6 days | Discharge: HOME | DRG: 091 | End: 2020-04-15
Attending: EMERGENCY MEDICINE
Payer: MEDICARE

## 2020-01-01 VITALS
WEIGHT: 199.8 LBS | SYSTOLIC BLOOD PRESSURE: 128 MMHG | TEMPERATURE: 96.7 F | DIASTOLIC BLOOD PRESSURE: 77 MMHG | HEIGHT: 74 IN | BODY MASS INDEX: 25.64 KG/M2 | HEART RATE: 62 BPM

## 2020-01-01 VITALS
HEIGHT: 74 IN | DIASTOLIC BLOOD PRESSURE: 81 MMHG | WEIGHT: 187.61 LBS | HEART RATE: 49 BPM | RESPIRATION RATE: 16 BRPM | BODY MASS INDEX: 24.08 KG/M2 | SYSTOLIC BLOOD PRESSURE: 154 MMHG | OXYGEN SATURATION: 96 % | TEMPERATURE: 97.4 F

## 2020-01-01 VITALS
WEIGHT: 200 LBS | RESPIRATION RATE: 18 BRPM | HEIGHT: 74 IN | BODY MASS INDEX: 25.67 KG/M2 | OXYGEN SATURATION: 99 % | DIASTOLIC BLOOD PRESSURE: 82 MMHG | HEART RATE: 60 BPM | SYSTOLIC BLOOD PRESSURE: 138 MMHG | TEMPERATURE: 98.1 F

## 2020-01-01 VITALS
BODY MASS INDEX: 25.67 KG/M2 | DIASTOLIC BLOOD PRESSURE: 68 MMHG | OXYGEN SATURATION: 98 % | HEIGHT: 74 IN | WEIGHT: 200 LBS | SYSTOLIC BLOOD PRESSURE: 150 MMHG | HEART RATE: 60 BPM | RESPIRATION RATE: 18 BRPM | TEMPERATURE: 98.6 F

## 2020-01-01 VITALS
HEART RATE: 63 BPM | BODY MASS INDEX: 25.81 KG/M2 | DIASTOLIC BLOOD PRESSURE: 65 MMHG | SYSTOLIC BLOOD PRESSURE: 130 MMHG | WEIGHT: 201 LBS

## 2020-01-01 VITALS
DIASTOLIC BLOOD PRESSURE: 80 MMHG | HEIGHT: 74 IN | BODY MASS INDEX: 26.26 KG/M2 | SYSTOLIC BLOOD PRESSURE: 116 MMHG | HEART RATE: 60 BPM | TEMPERATURE: 97.9 F | WEIGHT: 204.6 LBS

## 2020-01-01 VITALS
TEMPERATURE: 98.9 F | RESPIRATION RATE: 16 BRPM | BODY MASS INDEX: 22.32 KG/M2 | OXYGEN SATURATION: 92 % | SYSTOLIC BLOOD PRESSURE: 135 MMHG | HEIGHT: 74 IN | WEIGHT: 173.94 LBS | DIASTOLIC BLOOD PRESSURE: 71 MMHG | HEART RATE: 62 BPM

## 2020-01-01 VITALS — OXYGEN SATURATION: 100 % | SYSTOLIC BLOOD PRESSURE: 166 MMHG | DIASTOLIC BLOOD PRESSURE: 71 MMHG | TEMPERATURE: 96.6 F

## 2020-01-01 VITALS
SYSTOLIC BLOOD PRESSURE: 114 MMHG | DIASTOLIC BLOOD PRESSURE: 60 MMHG | RESPIRATION RATE: 21 BRPM | OXYGEN SATURATION: 100 % | TEMPERATURE: 95.9 F

## 2020-01-01 LAB
A/G RATIO: 1.3 (ref 1.1–2.2)
ABO/RH: NORMAL
ALBUMIN SERPL-MCNC: 1.7 G/DL (ref 3.4–5)
ALBUMIN SERPL-MCNC: 2.2 G/DL (ref 3.4–5)
ALBUMIN SERPL-MCNC: 2.3 G/DL (ref 3.4–5)
ALBUMIN SERPL-MCNC: 2.4 G/DL (ref 3.4–5)
ALBUMIN SERPL-MCNC: 2.5 G/DL (ref 3.4–5)
ALBUMIN SERPL-MCNC: 2.7 G/DL (ref 3.4–5)
ALBUMIN SERPL-MCNC: 2.8 G/DL (ref 3.4–5)
ALBUMIN SERPL-MCNC: 2.9 G/DL (ref 3.4–5)
ALBUMIN SERPL-MCNC: 3.4 G/DL (ref 3.4–5)
ALBUMIN SERPL-MCNC: 3.9 G/DL (ref 3.4–5)
ALBUMIN SERPL-MCNC: 4.1 G/DL (ref 3.4–5)
ALP BLD-CCNC: 62 U/L (ref 40–129)
ALP BLD-CCNC: 66 U/L (ref 40–129)
ALT SERPL-CCNC: 23 U/L (ref 10–40)
ALT SERPL-CCNC: 70 U/L (ref 10–40)
AMMONIA: 17 UMOL/L (ref 16–60)
AMMONIA: 51 UMOL/L (ref 16–60)
AMORPHOUS: ABNORMAL /HPF
ANION GAP SERPL CALCULATED.3IONS-SCNC: 10 MMOL/L (ref 3–16)
ANION GAP SERPL CALCULATED.3IONS-SCNC: 10 MMOL/L (ref 3–16)
ANION GAP SERPL CALCULATED.3IONS-SCNC: 11 MMOL/L (ref 3–16)
ANION GAP SERPL CALCULATED.3IONS-SCNC: 12 MMOL/L (ref 3–16)
ANION GAP SERPL CALCULATED.3IONS-SCNC: 13 MMOL/L (ref 3–16)
ANION GAP SERPL CALCULATED.3IONS-SCNC: 14 MMOL/L (ref 3–16)
ANION GAP SERPL CALCULATED.3IONS-SCNC: 15 MMOL/L (ref 3–16)
ANION GAP SERPL CALCULATED.3IONS-SCNC: 15 MMOL/L (ref 3–16)
ANION GAP SERPL CALCULATED.3IONS-SCNC: 16 MMOL/L (ref 3–16)
ANION GAP SERPL CALCULATED.3IONS-SCNC: 16 MMOL/L (ref 3–16)
ANION GAP SERPL CALCULATED.3IONS-SCNC: 17 MMOL/L (ref 3–16)
ANION GAP SERPL CALCULATED.3IONS-SCNC: 6 MMOL/L (ref 3–16)
ANION GAP SERPL CALCULATED.3IONS-SCNC: 7 MMOL/L (ref 3–16)
ANION GAP SERPL CALCULATED.3IONS-SCNC: 9 MMOL/L (ref 3–16)
ANISOCYTOSIS: ABNORMAL
ANTIBODY SCREEN: NORMAL
AST SERPL-CCNC: 29 U/L (ref 15–37)
AST SERPL-CCNC: 48 U/L (ref 15–37)
BACTERIA: ABNORMAL /HPF
BASE EXCESS VENOUS: 3.6 MMOL/L (ref -2–3)
BASOPHILS ABSOLUTE: 0 K/UL (ref 0–0.2)
BASOPHILS RELATIVE PERCENT: 0 %
BASOPHILS RELATIVE PERCENT: 0.2 %
BASOPHILS RELATIVE PERCENT: 0.3 %
BASOPHILS RELATIVE PERCENT: 0.4 %
BASOPHILS RELATIVE PERCENT: 0.4 %
BASOPHILS RELATIVE PERCENT: 0.5 %
BASOPHILS RELATIVE PERCENT: 0.6 %
BILIRUB SERPL-MCNC: 0.6 MG/DL (ref 0–1)
BILIRUB SERPL-MCNC: 0.7 MG/DL (ref 0–1)
BILIRUBIN DIRECT: <0.2 MG/DL (ref 0–0.3)
BILIRUBIN URINE: ABNORMAL
BILIRUBIN URINE: ABNORMAL
BILIRUBIN URINE: NEGATIVE
BILIRUBIN URINE: NEGATIVE
BILIRUBIN, INDIRECT: ABNORMAL MG/DL (ref 0–1)
BLOOD CULTURE, ROUTINE: NORMAL
BLOOD CULTURE, ROUTINE: NORMAL
BLOOD, URINE: ABNORMAL
BUN BLDV-MCNC: 12 MG/DL (ref 7–20)
BUN BLDV-MCNC: 12 MG/DL (ref 7–20)
BUN BLDV-MCNC: 13 MG/DL (ref 7–20)
BUN BLDV-MCNC: 14 MG/DL (ref 7–20)
BUN BLDV-MCNC: 15 MG/DL (ref 7–20)
BUN BLDV-MCNC: 16 MG/DL (ref 7–20)
BUN BLDV-MCNC: 17 MG/DL (ref 7–20)
BUN BLDV-MCNC: 18 MG/DL (ref 7–20)
BUN BLDV-MCNC: 19 MG/DL (ref 7–20)
BUN BLDV-MCNC: 19 MG/DL (ref 7–20)
BUN BLDV-MCNC: 20 MG/DL (ref 7–20)
BUN BLDV-MCNC: 21 MG/DL (ref 7–20)
BUN BLDV-MCNC: 25 MG/DL (ref 7–20)
CALCIUM IONIZED: 1.1 MMOL/L (ref 1.12–1.32)
CALCIUM SERPL-MCNC: 5.1 MG/DL (ref 8.3–10.6)
CALCIUM SERPL-MCNC: 7.9 MG/DL (ref 8.3–10.6)
CALCIUM SERPL-MCNC: 8.1 MG/DL (ref 8.3–10.6)
CALCIUM SERPL-MCNC: 8.2 MG/DL (ref 8.3–10.6)
CALCIUM SERPL-MCNC: 8.3 MG/DL (ref 8.3–10.6)
CALCIUM SERPL-MCNC: 8.4 MG/DL (ref 8.3–10.6)
CALCIUM SERPL-MCNC: 8.7 MG/DL (ref 8.3–10.6)
CALCIUM SERPL-MCNC: 8.8 MG/DL (ref 8.3–10.6)
CALCIUM SERPL-MCNC: 8.9 MG/DL (ref 8.3–10.6)
CALCIUM SERPL-MCNC: 8.9 MG/DL (ref 8.3–10.6)
CALCIUM SERPL-MCNC: 9 MG/DL (ref 8.3–10.6)
CALCIUM SERPL-MCNC: 9.2 MG/DL (ref 8.3–10.6)
CALCIUM SERPL-MCNC: 9.3 MG/DL (ref 8.3–10.6)
CALCIUM SERPL-MCNC: 9.6 MG/DL (ref 8.3–10.6)
CARBOXYHEMOGLOBIN: 1.1 % (ref 0–1.5)
CHLORIDE BLD-SCNC: 100 MMOL/L (ref 99–110)
CHLORIDE BLD-SCNC: 101 MMOL/L (ref 99–110)
CHLORIDE BLD-SCNC: 101 MMOL/L (ref 99–110)
CHLORIDE BLD-SCNC: 102 MMOL/L (ref 99–110)
CHLORIDE BLD-SCNC: 102 MMOL/L (ref 99–110)
CHLORIDE BLD-SCNC: 103 MMOL/L (ref 99–110)
CHLORIDE BLD-SCNC: 104 MMOL/L (ref 99–110)
CHLORIDE BLD-SCNC: 105 MMOL/L (ref 99–110)
CHLORIDE BLD-SCNC: 106 MMOL/L (ref 99–110)
CHLORIDE BLD-SCNC: 106 MMOL/L (ref 99–110)
CHLORIDE BLD-SCNC: 107 MMOL/L (ref 99–110)
CHLORIDE BLD-SCNC: 108 MMOL/L (ref 99–110)
CHLORIDE BLD-SCNC: 109 MMOL/L (ref 99–110)
CHLORIDE BLD-SCNC: 110 MMOL/L (ref 99–110)
CHLORIDE BLD-SCNC: 111 MMOL/L (ref 99–110)
CHLORIDE BLD-SCNC: 112 MMOL/L (ref 99–110)
CHLORIDE BLD-SCNC: 119 MMOL/L (ref 99–110)
CHLORIDE BLD-SCNC: 120 MMOL/L (ref 99–110)
CLARITY: ABNORMAL
CLARITY: CLEAR
CO2: 19 MMOL/L (ref 21–32)
CO2: 20 MMOL/L (ref 21–32)
CO2: 20 MMOL/L (ref 21–32)
CO2: 21 MMOL/L (ref 21–32)
CO2: 22 MMOL/L (ref 21–32)
CO2: 23 MMOL/L (ref 21–32)
CO2: 23 MMOL/L (ref 21–32)
CO2: 24 MMOL/L (ref 21–32)
CO2: 24 MMOL/L (ref 21–32)
CO2: 25 MMOL/L (ref 21–32)
CO2: 26 MMOL/L (ref 21–32)
CO2: 27 MMOL/L (ref 21–32)
CO2: 28 MMOL/L (ref 21–32)
CO2: 29 MMOL/L (ref 21–32)
CO2: 30 MMOL/L (ref 21–32)
CO2: 30 MMOL/L (ref 21–32)
CO2: 31 MMOL/L (ref 21–32)
CO2: 32 MMOL/L (ref 21–32)
COLOR: YELLOW
COMMENT UA: ABNORMAL
CREAT SERPL-MCNC: 0.6 MG/DL (ref 0.8–1.3)
CREAT SERPL-MCNC: 0.9 MG/DL (ref 0.8–1.3)
CREAT SERPL-MCNC: 1 MG/DL (ref 0.8–1.3)
CREAT SERPL-MCNC: 1 MG/DL (ref 0.8–1.3)
CREAT SERPL-MCNC: 1.1 MG/DL (ref 0.8–1.3)
CREAT SERPL-MCNC: 1.2 MG/DL (ref 0.8–1.3)
CREAT SERPL-MCNC: 1.3 MG/DL (ref 0.8–1.3)
CREAT SERPL-MCNC: 1.4 MG/DL (ref 0.8–1.3)
CREAT SERPL-MCNC: 1.5 MG/DL (ref 0.8–1.3)
CREAT SERPL-MCNC: 1.7 MG/DL (ref 0.8–1.3)
CREAT SERPL-MCNC: 1.9 MG/DL (ref 0.8–1.3)
CREATININE URINE: 324.7 MG/DL (ref 39–259)
CRYSTALS, UA: ABNORMAL /HPF
CULTURE, BLOOD 2: NORMAL
EKG ATRIAL RATE: 0 BPM
EKG ATRIAL RATE: 131 BPM
EKG ATRIAL RATE: 53 BPM
EKG ATRIAL RATE: 60 BPM
EKG ATRIAL RATE: 72 BPM
EKG DIAGNOSIS: NORMAL
EKG P AXIS: 18 DEGREES
EKG P AXIS: 47 DEGREES
EKG P AXIS: 58 DEGREES
EKG P-R INTERVAL: 140 MS
EKG P-R INTERVAL: 158 MS
EKG P-R INTERVAL: 164 MS
EKG Q-T INTERVAL: 282 MS
EKG Q-T INTERVAL: 340 MS
EKG Q-T INTERVAL: 364 MS
EKG Q-T INTERVAL: 388 MS
EKG Q-T INTERVAL: 408 MS
EKG Q-T INTERVAL: 522 MS
EKG Q-T INTERVAL: 522 MS
EKG QRS DURATION: 60 MS
EKG QRS DURATION: 68 MS
EKG QRS DURATION: 68 MS
EKG QRS DURATION: 70 MS
EKG QRS DURATION: 72 MS
EKG QRS DURATION: 76 MS
EKG QRS DURATION: 84 MS
EKG QTC CALCULATION (BAZETT): 340 MS
EKG QTC CALCULATION (BAZETT): 382 MS
EKG QTC CALCULATION (BAZETT): 396 MS
EKG QTC CALCULATION (BAZETT): 424 MS
EKG QTC CALCULATION (BAZETT): 459 MS
EKG QTC CALCULATION (BAZETT): 567 MS
EKG QTC CALCULATION (BAZETT): 567 MS
EKG R AXIS: 32 DEGREES
EKG R AXIS: 38 DEGREES
EKG R AXIS: 46 DEGREES
EKG R AXIS: 51 DEGREES
EKG R AXIS: 54 DEGREES
EKG R AXIS: 72 DEGREES
EKG R AXIS: 8 DEGREES
EKG T AXIS: -84 DEGREES
EKG T AXIS: 258 DEGREES
EKG T AXIS: 270 DEGREES
EKG T AXIS: 41 DEGREES
EKG T AXIS: 60 DEGREES
EKG T AXIS: 86 DEGREES
EKG T AXIS: 95 DEGREES
EKG VENTRICULAR RATE: 119 BPM
EKG VENTRICULAR RATE: 53 BPM
EKG VENTRICULAR RATE: 60 BPM
EKG VENTRICULAR RATE: 71 BPM
EKG VENTRICULAR RATE: 71 BPM
EKG VENTRICULAR RATE: 72 BPM
EKG VENTRICULAR RATE: 96 BPM
EOSINOPHILS ABSOLUTE: 0 K/UL (ref 0–0.6)
EOSINOPHILS ABSOLUTE: 0.1 K/UL (ref 0–0.6)
EOSINOPHILS ABSOLUTE: 0.3 K/UL (ref 0–0.6)
EOSINOPHILS RELATIVE PERCENT: 0 %
EOSINOPHILS RELATIVE PERCENT: 0.3 %
EOSINOPHILS RELATIVE PERCENT: 0.4 %
EOSINOPHILS RELATIVE PERCENT: 0.4 %
EOSINOPHILS RELATIVE PERCENT: 0.7 %
EOSINOPHILS RELATIVE PERCENT: 1.7 %
EOSINOPHILS RELATIVE PERCENT: 1.9 %
EOSINOPHILS RELATIVE PERCENT: 2.4 %
EOSINOPHILS RELATIVE PERCENT: 3.6 %
EOSINOPHILS RELATIVE PERCENT: 5 %
EPITHELIAL CELLS, UA: ABNORMAL /HPF (ref 0–5)
GFR AFRICAN AMERICAN: 42
GFR AFRICAN AMERICAN: 48
GFR AFRICAN AMERICAN: 55
GFR AFRICAN AMERICAN: 60
GFR AFRICAN AMERICAN: >60
GFR NON-AFRICAN AMERICAN: 35
GFR NON-AFRICAN AMERICAN: 39
GFR NON-AFRICAN AMERICAN: 46
GFR NON-AFRICAN AMERICAN: 49
GFR NON-AFRICAN AMERICAN: 54
GFR NON-AFRICAN AMERICAN: 59
GFR NON-AFRICAN AMERICAN: >60
GLOBULIN: 3.1 G/DL
GLUCOSE BLD-MCNC: 105 MG/DL (ref 70–99)
GLUCOSE BLD-MCNC: 107 MG/DL (ref 70–99)
GLUCOSE BLD-MCNC: 110 MG/DL (ref 70–99)
GLUCOSE BLD-MCNC: 112 MG/DL (ref 70–99)
GLUCOSE BLD-MCNC: 113 MG/DL (ref 70–99)
GLUCOSE BLD-MCNC: 118 MG/DL (ref 70–99)
GLUCOSE BLD-MCNC: 127 MG/DL (ref 70–99)
GLUCOSE BLD-MCNC: 127 MG/DL (ref 70–99)
GLUCOSE BLD-MCNC: 128 MG/DL (ref 70–99)
GLUCOSE BLD-MCNC: 129 MG/DL (ref 70–99)
GLUCOSE BLD-MCNC: 130 MG/DL (ref 70–99)
GLUCOSE BLD-MCNC: 131 MG/DL (ref 70–99)
GLUCOSE BLD-MCNC: 132 MG/DL (ref 70–99)
GLUCOSE BLD-MCNC: 134 MG/DL (ref 70–99)
GLUCOSE BLD-MCNC: 139 MG/DL (ref 70–99)
GLUCOSE BLD-MCNC: 68 MG/DL (ref 70–99)
GLUCOSE BLD-MCNC: 87 MG/DL (ref 70–99)
GLUCOSE BLD-MCNC: 88 MG/DL (ref 70–99)
GLUCOSE BLD-MCNC: 96 MG/DL (ref 70–99)
GLUCOSE URINE: 100 MG/DL
GLUCOSE URINE: NEGATIVE MG/DL
GRAM STAIN RESULT: ABNORMAL
HCO3 VENOUS: 31.4 MMOL/L (ref 24–28)
HCT VFR BLD CALC: 28.1 % (ref 40.5–52.5)
HCT VFR BLD CALC: 29.4 % (ref 40.5–52.5)
HCT VFR BLD CALC: 30.5 % (ref 40.5–52.5)
HCT VFR BLD CALC: 30.8 % (ref 40.5–52.5)
HCT VFR BLD CALC: 34.4 % (ref 40.5–52.5)
HCT VFR BLD CALC: 35.9 % (ref 40.5–52.5)
HCT VFR BLD CALC: 37.1 % (ref 40.5–52.5)
HCT VFR BLD CALC: 37.9 % (ref 40.5–52.5)
HCT VFR BLD CALC: 38.2 % (ref 40.5–52.5)
HCT VFR BLD CALC: 38.6 % (ref 40.5–52.5)
HCT VFR BLD CALC: 40 % (ref 40.5–52.5)
HCT VFR BLD CALC: 40.1 % (ref 40.5–52.5)
HCT VFR BLD CALC: 40.3 % (ref 40.5–52.5)
HCT VFR BLD CALC: 42.9 % (ref 40.5–52.5)
HCT VFR BLD CALC: 44 % (ref 40.5–52.5)
HEMOGLOBIN, VEN, REDUCED: 78.6 %
HEMOGLOBIN: 10.1 G/DL (ref 13.5–17.5)
HEMOGLOBIN: 10.2 G/DL (ref 13.5–17.5)
HEMOGLOBIN: 11.4 G/DL (ref 13.5–17.5)
HEMOGLOBIN: 12 G/DL (ref 13.5–17.5)
HEMOGLOBIN: 12.1 G/DL (ref 13.5–17.5)
HEMOGLOBIN: 12.4 G/DL (ref 13.5–17.5)
HEMOGLOBIN: 12.8 G/DL (ref 13.5–17.5)
HEMOGLOBIN: 12.8 G/DL (ref 13.5–17.5)
HEMOGLOBIN: 13.1 G/DL (ref 13.5–17.5)
HEMOGLOBIN: 13.2 G/DL (ref 13.5–17.5)
HEMOGLOBIN: 13.5 G/DL (ref 13.5–17.5)
HEMOGLOBIN: 14.1 G/DL (ref 13.5–17.5)
HEMOGLOBIN: 14.4 G/DL (ref 13.5–17.5)
HEMOGLOBIN: 9.5 G/DL (ref 13.5–17.5)
HEMOGLOBIN: 9.8 G/DL (ref 13.5–17.5)
HYALINE CASTS: ABNORMAL /LPF (ref 0–2)
INR BLD: 1.49 (ref 0.86–1.14)
KETONES, URINE: 15 MG/DL
KETONES, URINE: 15 MG/DL
KETONES, URINE: NEGATIVE MG/DL
KETONES, URINE: NEGATIVE MG/DL
LACTIC ACID: 3.1 MMOL/L (ref 0.4–2)
LEUKOCYTE ESTERASE, URINE: ABNORMAL
LEUKOCYTE ESTERASE, URINE: ABNORMAL
LEUKOCYTE ESTERASE, URINE: NEGATIVE
LEUKOCYTE ESTERASE, URINE: NEGATIVE
LYMPHOCYTES ABSOLUTE: 0.5 K/UL (ref 1–5.1)
LYMPHOCYTES ABSOLUTE: 0.6 K/UL (ref 1–5.1)
LYMPHOCYTES ABSOLUTE: 0.7 K/UL (ref 1–5.1)
LYMPHOCYTES ABSOLUTE: 0.8 K/UL (ref 1–5.1)
LYMPHOCYTES ABSOLUTE: 1.3 K/UL (ref 1–5.1)
LYMPHOCYTES ABSOLUTE: 1.4 K/UL (ref 1–5.1)
LYMPHOCYTES ABSOLUTE: 1.6 K/UL (ref 1–5.1)
LYMPHOCYTES RELATIVE PERCENT: 10 %
LYMPHOCYTES RELATIVE PERCENT: 10.2 %
LYMPHOCYTES RELATIVE PERCENT: 13 %
LYMPHOCYTES RELATIVE PERCENT: 19.3 %
LYMPHOCYTES RELATIVE PERCENT: 31.4 %
LYMPHOCYTES RELATIVE PERCENT: 38.8 %
LYMPHOCYTES RELATIVE PERCENT: 6.5 %
LYMPHOCYTES RELATIVE PERCENT: 7.8 %
LYMPHOCYTES RELATIVE PERCENT: 8.9 %
LYMPHOCYTES RELATIVE PERCENT: 9.2 %
MAGNESIUM: 1.4 MG/DL (ref 1.8–2.4)
MAGNESIUM: 1.7 MG/DL (ref 1.8–2.4)
MAGNESIUM: 2 MG/DL (ref 1.8–2.4)
MAGNESIUM: 2.2 MG/DL (ref 1.8–2.4)
MAGNESIUM: 2.2 MG/DL (ref 1.8–2.4)
MAGNESIUM: 2.3 MG/DL (ref 1.8–2.4)
MAGNESIUM: 2.4 MG/DL (ref 1.8–2.4)
MAGNESIUM: 2.5 MG/DL (ref 1.8–2.4)
MAGNESIUM: 3 MG/DL (ref 1.8–2.4)
MCH RBC QN AUTO: 29.5 PG (ref 26–34)
MCH RBC QN AUTO: 29.8 PG (ref 26–34)
MCH RBC QN AUTO: 30 PG (ref 26–34)
MCH RBC QN AUTO: 30 PG (ref 26–34)
MCH RBC QN AUTO: 30.1 PG (ref 26–34)
MCH RBC QN AUTO: 30.2 PG (ref 26–34)
MCH RBC QN AUTO: 30.3 PG (ref 26–34)
MCH RBC QN AUTO: 30.5 PG (ref 26–34)
MCHC RBC AUTO-ENTMCNC: 32.3 G/DL (ref 31–36)
MCHC RBC AUTO-ENTMCNC: 32.4 G/DL (ref 31–36)
MCHC RBC AUTO-ENTMCNC: 32.5 G/DL (ref 31–36)
MCHC RBC AUTO-ENTMCNC: 32.7 G/DL (ref 31–36)
MCHC RBC AUTO-ENTMCNC: 32.9 G/DL (ref 31–36)
MCHC RBC AUTO-ENTMCNC: 32.9 G/DL (ref 31–36)
MCHC RBC AUTO-ENTMCNC: 33.1 G/DL (ref 31–36)
MCHC RBC AUTO-ENTMCNC: 33.2 G/DL (ref 31–36)
MCHC RBC AUTO-ENTMCNC: 33.5 G/DL (ref 31–36)
MCHC RBC AUTO-ENTMCNC: 33.6 G/DL (ref 31–36)
MCHC RBC AUTO-ENTMCNC: 33.6 G/DL (ref 31–36)
MCHC RBC AUTO-ENTMCNC: 33.7 G/DL (ref 31–36)
MCHC RBC AUTO-ENTMCNC: 33.8 G/DL (ref 31–36)
MCV RBC AUTO: 89.3 FL (ref 80–100)
MCV RBC AUTO: 89.8 FL (ref 80–100)
MCV RBC AUTO: 89.9 FL (ref 80–100)
MCV RBC AUTO: 90 FL (ref 80–100)
MCV RBC AUTO: 90.2 FL (ref 80–100)
MCV RBC AUTO: 90.5 FL (ref 80–100)
MCV RBC AUTO: 90.9 FL (ref 80–100)
MCV RBC AUTO: 91 FL (ref 80–100)
MCV RBC AUTO: 91.1 FL (ref 80–100)
MCV RBC AUTO: 91.3 FL (ref 80–100)
MCV RBC AUTO: 91.8 FL (ref 80–100)
MCV RBC AUTO: 92.9 FL (ref 80–100)
METHEMOGLOBIN VENOUS: 0.5 % (ref 0–1.5)
MICROSCOPIC EXAMINATION: YES
MONOCYTES ABSOLUTE: 0.2 K/UL (ref 0–1.3)
MONOCYTES ABSOLUTE: 0.4 K/UL (ref 0–1.3)
MONOCYTES ABSOLUTE: 0.5 K/UL (ref 0–1.3)
MONOCYTES ABSOLUTE: 0.6 K/UL (ref 0–1.3)
MONOCYTES ABSOLUTE: 0.7 K/UL (ref 0–1.3)
MONOCYTES RELATIVE PERCENT: 10 %
MONOCYTES RELATIVE PERCENT: 12 %
MONOCYTES RELATIVE PERCENT: 3 %
MONOCYTES RELATIVE PERCENT: 6 %
MONOCYTES RELATIVE PERCENT: 6.7 %
MONOCYTES RELATIVE PERCENT: 7 %
MONOCYTES RELATIVE PERCENT: 7 %
MONOCYTES RELATIVE PERCENT: 7.9 %
MONOCYTES RELATIVE PERCENT: 7.9 %
MONOCYTES RELATIVE PERCENT: 8 %
MUCUS: ABNORMAL /LPF
NEUTROPHILS ABSOLUTE: 1.7 K/UL (ref 1.7–7.7)
NEUTROPHILS ABSOLUTE: 2.9 K/UL (ref 1.7–7.7)
NEUTROPHILS ABSOLUTE: 4.1 K/UL (ref 1.7–7.7)
NEUTROPHILS ABSOLUTE: 4.7 K/UL (ref 1.7–7.7)
NEUTROPHILS ABSOLUTE: 4.8 K/UL (ref 1.7–7.7)
NEUTROPHILS ABSOLUTE: 4.9 K/UL (ref 1.7–7.7)
NEUTROPHILS ABSOLUTE: 4.9 K/UL (ref 1.7–7.7)
NEUTROPHILS ABSOLUTE: 6.5 K/UL (ref 1.7–7.7)
NEUTROPHILS ABSOLUTE: 6.5 K/UL (ref 1.7–7.7)
NEUTROPHILS ABSOLUTE: 8.1 K/UL (ref 1.7–7.7)
NEUTROPHILS RELATIVE PERCENT: 45.1 %
NEUTROPHILS RELATIVE PERCENT: 58.3 %
NEUTROPHILS RELATIVE PERCENT: 72.9 %
NEUTROPHILS RELATIVE PERCENT: 79 %
NEUTROPHILS RELATIVE PERCENT: 79.4 %
NEUTROPHILS RELATIVE PERCENT: 80.2 %
NEUTROPHILS RELATIVE PERCENT: 80.7 %
NEUTROPHILS RELATIVE PERCENT: 83.7 %
NEUTROPHILS RELATIVE PERCENT: 84.6 %
NEUTROPHILS RELATIVE PERCENT: 86.1 %
NITRITE, URINE: NEGATIVE
NITRITE, URINE: POSITIVE
O2 SAT, VEN: 20 %
ORGANISM: ABNORMAL
OSMOLALITY URINE: 861 MOSM/KG (ref 390–1070)
PCO2, VEN: 64.4 MMHG (ref 41–51)
PDW BLD-RTO: 14.4 % (ref 12.4–15.4)
PDW BLD-RTO: 14.5 % (ref 12.4–15.4)
PDW BLD-RTO: 14.5 % (ref 12.4–15.4)
PDW BLD-RTO: 14.6 % (ref 12.4–15.4)
PDW BLD-RTO: 14.7 % (ref 12.4–15.4)
PDW BLD-RTO: 14.7 % (ref 12.4–15.4)
PDW BLD-RTO: 14.8 % (ref 12.4–15.4)
PDW BLD-RTO: 14.9 % (ref 12.4–15.4)
PDW BLD-RTO: 15.1 % (ref 12.4–15.4)
PDW BLD-RTO: 15.1 % (ref 12.4–15.4)
PDW BLD-RTO: 15.4 % (ref 12.4–15.4)
PH UA: 5.5 (ref 5–8)
PH UA: 6 (ref 5–8)
PH UA: 6.5 (ref 5–8)
PH UA: 7 (ref 5–8)
PH VENOUS: 7.31 (ref 7.35–7.45)
PH VENOUS: 7.45 (ref 7.35–7.45)
PHOSPHORUS: 1.4 MG/DL (ref 2.5–4.9)
PHOSPHORUS: 2.5 MG/DL (ref 2.5–4.9)
PHOSPHORUS: 2.7 MG/DL (ref 2.5–4.9)
PHOSPHORUS: 2.8 MG/DL (ref 2.5–4.9)
PHOSPHORUS: 2.9 MG/DL (ref 2.5–4.9)
PHOSPHORUS: 3.1 MG/DL (ref 2.5–4.9)
PHOSPHORUS: 3.2 MG/DL (ref 2.5–4.9)
PHOSPHORUS: 3.3 MG/DL (ref 2.5–4.9)
PHOSPHORUS: 3.4 MG/DL (ref 2.5–4.9)
PHOSPHORUS: 3.6 MG/DL (ref 2.5–4.9)
PLATELET # BLD: 116 K/UL (ref 135–450)
PLATELET # BLD: 123 K/UL (ref 135–450)
PLATELET # BLD: 125 K/UL (ref 135–450)
PLATELET # BLD: 129 K/UL (ref 135–450)
PLATELET # BLD: 140 K/UL (ref 135–450)
PLATELET # BLD: 142 K/UL (ref 135–450)
PLATELET # BLD: 145 K/UL (ref 135–450)
PLATELET # BLD: 145 K/UL (ref 135–450)
PLATELET # BLD: 146 K/UL (ref 135–450)
PLATELET # BLD: 148 K/UL (ref 135–450)
PLATELET # BLD: 148 K/UL (ref 135–450)
PLATELET # BLD: 163 K/UL (ref 135–450)
PLATELET # BLD: 164 K/UL (ref 135–450)
PLATELET # BLD: 183 K/UL (ref 135–450)
PLATELET # BLD: 185 K/UL (ref 135–450)
PMV BLD AUTO: 10.2 FL (ref 5–10.5)
PMV BLD AUTO: 8.3 FL (ref 5–10.5)
PMV BLD AUTO: 8.7 FL (ref 5–10.5)
PMV BLD AUTO: 8.8 FL (ref 5–10.5)
PMV BLD AUTO: 8.8 FL (ref 5–10.5)
PMV BLD AUTO: 8.9 FL (ref 5–10.5)
PMV BLD AUTO: 9 FL (ref 5–10.5)
PMV BLD AUTO: 9.1 FL (ref 5–10.5)
PMV BLD AUTO: 9.1 FL (ref 5–10.5)
PMV BLD AUTO: 9.4 FL (ref 5–10.5)
PMV BLD AUTO: 9.5 FL (ref 5–10.5)
PMV BLD AUTO: 9.9 FL (ref 5–10.5)
PMV BLD AUTO: 9.9 FL (ref 5–10.5)
PO2, VEN: 20.5 MMHG (ref 25–40)
POTASSIUM REFLEX MAGNESIUM: 3.6 MMOL/L (ref 3.5–5.1)
POTASSIUM REFLEX MAGNESIUM: 3.7 MMOL/L (ref 3.5–5.1)
POTASSIUM REFLEX MAGNESIUM: 3.9 MMOL/L (ref 3.5–5.1)
POTASSIUM REFLEX MAGNESIUM: 4.2 MMOL/L (ref 3.5–5.1)
POTASSIUM REFLEX MAGNESIUM: 4.3 MMOL/L (ref 3.5–5.1)
POTASSIUM SERPL-SCNC: 2.4 MMOL/L (ref 3.5–5.1)
POTASSIUM SERPL-SCNC: 2.4 MMOL/L (ref 3.5–5.1)
POTASSIUM SERPL-SCNC: 3.7 MMOL/L (ref 3.5–5.1)
POTASSIUM SERPL-SCNC: 3.7 MMOL/L (ref 3.5–5.1)
POTASSIUM SERPL-SCNC: 3.8 MMOL/L (ref 3.5–5.1)
POTASSIUM SERPL-SCNC: 3.9 MMOL/L (ref 3.5–5.1)
POTASSIUM SERPL-SCNC: 4 MMOL/L (ref 3.5–5.1)
POTASSIUM SERPL-SCNC: 4.2 MMOL/L (ref 3.5–5.1)
POTASSIUM SERPL-SCNC: 4.3 MMOL/L (ref 3.5–5.1)
POTASSIUM SERPL-SCNC: 4.4 MMOL/L (ref 3.5–5.1)
POTASSIUM SERPL-SCNC: 4.5 MMOL/L (ref 3.5–5.1)
POTASSIUM SERPL-SCNC: 4.5 MMOL/L (ref 3.5–5.1)
POTASSIUM SERPL-SCNC: 4.6 MMOL/L (ref 3.5–5.1)
POTASSIUM SERPL-SCNC: 4.9 MMOL/L (ref 3.5–5.1)
POTASSIUM, UR: 78.5 MMOL/L
PRO-BNP: 68 PG/ML (ref 0–449)
PROTEIN UA: 30 MG/DL
PROTEIN UA: >=300 MG/DL
PROTEIN UA: ABNORMAL MG/DL
PROTEIN UA: ABNORMAL MG/DL
PROTHROMBIN TIME: 17.3 SEC (ref 10–13.2)
RBC # BLD: 3.14 M/UL (ref 4.2–5.9)
RBC # BLD: 3.26 M/UL (ref 4.2–5.9)
RBC # BLD: 3.34 M/UL (ref 4.2–5.9)
RBC # BLD: 3.41 M/UL (ref 4.2–5.9)
RBC # BLD: 3.75 M/UL (ref 4.2–5.9)
RBC # BLD: 3.98 M/UL (ref 4.2–5.9)
RBC # BLD: 3.99 M/UL (ref 4.2–5.9)
RBC # BLD: 4.19 M/UL (ref 4.2–5.9)
RBC # BLD: 4.22 M/UL (ref 4.2–5.9)
RBC # BLD: 4.24 M/UL (ref 4.2–5.9)
RBC # BLD: 4.39 M/UL (ref 4.2–5.9)
RBC # BLD: 4.39 M/UL (ref 4.2–5.9)
RBC # BLD: 4.47 M/UL (ref 4.2–5.9)
RBC # BLD: 4.67 M/UL (ref 4.2–5.9)
RBC # BLD: 4.79 M/UL (ref 4.2–5.9)
RBC UA: >100 /HPF (ref 0–4)
RBC UA: ABNORMAL /HPF (ref 0–4)
REASON FOR REJECTION: NORMAL
REJECTED TEST: NORMAL
RENAL EPITHELIAL, UA: ABNORMAL /HPF (ref 0–1)
SARS-COV-2, NAAT: NOT DETECTED
SODIUM BLD-SCNC: 138 MMOL/L (ref 136–145)
SODIUM BLD-SCNC: 139 MMOL/L (ref 136–145)
SODIUM BLD-SCNC: 139 MMOL/L (ref 136–145)
SODIUM BLD-SCNC: 140 MMOL/L (ref 136–145)
SODIUM BLD-SCNC: 140 MMOL/L (ref 136–145)
SODIUM BLD-SCNC: 141 MMOL/L (ref 136–145)
SODIUM BLD-SCNC: 142 MMOL/L (ref 136–145)
SODIUM BLD-SCNC: 143 MMOL/L (ref 136–145)
SODIUM BLD-SCNC: 144 MMOL/L (ref 136–145)
SODIUM BLD-SCNC: 145 MMOL/L (ref 136–145)
SODIUM BLD-SCNC: 146 MMOL/L (ref 136–145)
SODIUM BLD-SCNC: 147 MMOL/L (ref 136–145)
SODIUM BLD-SCNC: 147 MMOL/L (ref 136–145)
SODIUM BLD-SCNC: 148 MMOL/L (ref 136–145)
SODIUM BLD-SCNC: 149 MMOL/L (ref 136–145)
SODIUM BLD-SCNC: 150 MMOL/L (ref 136–145)
SODIUM BLD-SCNC: 151 MMOL/L (ref 136–145)
SODIUM URINE: 49 MMOL/L
SPECIFIC GRAVITY UA: 1.02 (ref 1–1.03)
SPECIFIC GRAVITY UA: 1.02 (ref 1–1.03)
SPECIFIC GRAVITY UA: >=1.03 (ref 1–1.03)
SPECIFIC GRAVITY UA: >=1.03 (ref 1–1.03)
TCO2 CALC VENOUS: 33 MMOL/L
TOTAL CK: 437 U/L (ref 39–308)
TOTAL CK: 442 U/L (ref 39–308)
TOTAL PROTEIN: 7.1 G/DL (ref 6.4–8.2)
TOTAL PROTEIN: 7.2 G/DL (ref 6.4–8.2)
TROPONIN: 0.01 NG/ML
TROPONIN: 0.02 NG/ML
TROPONIN: 0.02 NG/ML
TROPONIN: 0.04 NG/ML
TROPONIN: 0.04 NG/ML
TSH REFLEX: 1.58 UIU/ML (ref 0.27–4.2)
TSH REFLEX: 1.65 UIU/ML (ref 0.27–4.2)
URINE CULTURE, ROUTINE: ABNORMAL
URINE CULTURE, ROUTINE: ABNORMAL
URINE CULTURE, ROUTINE: NORMAL
URINE REFLEX TO CULTURE: YES
URINE TYPE: ABNORMAL
UROBILINOGEN, URINE: 0.2 E.U./DL
UROBILINOGEN, URINE: 0.2 E.U./DL
UROBILINOGEN, URINE: >=8 E.U./DL
UROBILINOGEN, URINE: >=8 E.U./DL
VALPROIC ACID LEVEL: 85.7 UG/ML (ref 50–100)
VALPROIC ACID LEVEL: <2.8 UG/ML (ref 50–100)
VITAMIN B-12: >2000 PG/ML (ref 211–911)
VITAMIN B-12: >2000 PG/ML (ref 211–911)
WBC # BLD: 3.6 K/UL (ref 4–11)
WBC # BLD: 3.7 K/UL (ref 4–11)
WBC # BLD: 3.9 K/UL (ref 4–11)
WBC # BLD: 4.6 K/UL (ref 4–11)
WBC # BLD: 4.6 K/UL (ref 4–11)
WBC # BLD: 5 K/UL (ref 4–11)
WBC # BLD: 5.1 K/UL (ref 4–11)
WBC # BLD: 5.6 K/UL (ref 4–11)
WBC # BLD: 5.9 K/UL (ref 4–11)
WBC # BLD: 6 K/UL (ref 4–11)
WBC # BLD: 6 K/UL (ref 4–11)
WBC # BLD: 6.7 K/UL (ref 4–11)
WBC # BLD: 7.7 K/UL (ref 4–11)
WBC # BLD: 7.7 K/UL (ref 4–11)
WBC # BLD: 9.4 K/UL (ref 4–11)
WBC UA: ABNORMAL /HPF (ref 0–5)
WOUND/ABSCESS: ABNORMAL
YEAST: PRESENT /HPF

## 2020-01-01 PROCEDURE — 70450 CT HEAD/BRAIN W/O DYE: CPT

## 2020-01-01 PROCEDURE — 6370000000 HC RX 637 (ALT 250 FOR IP): Performed by: SURGERY

## 2020-01-01 PROCEDURE — 2580000003 HC RX 258: Performed by: STUDENT IN AN ORGANIZED HEALTH CARE EDUCATION/TRAINING PROGRAM

## 2020-01-01 PROCEDURE — 87086 URINE CULTURE/COLONY COUNT: CPT

## 2020-01-01 PROCEDURE — 36415 COLL VENOUS BLD VENIPUNCTURE: CPT

## 2020-01-01 PROCEDURE — 6360000002 HC RX W HCPCS: Performed by: SURGERY

## 2020-01-01 PROCEDURE — 85025 COMPLETE CBC W/AUTO DIFF WBC: CPT

## 2020-01-01 PROCEDURE — 83735 ASSAY OF MAGNESIUM: CPT

## 2020-01-01 PROCEDURE — 4040F PNEUMOC VAC/ADMIN/RCVD: CPT | Performed by: OTOLARYNGOLOGY

## 2020-01-01 PROCEDURE — 71045 X-RAY EXAM CHEST 1 VIEW: CPT

## 2020-01-01 PROCEDURE — 94761 N-INVAS EAR/PLS OXIMETRY MLT: CPT

## 2020-01-01 PROCEDURE — 87205 SMEAR GRAM STAIN: CPT

## 2020-01-01 PROCEDURE — 93010 ELECTROCARDIOGRAM REPORT: CPT | Performed by: INTERNAL MEDICINE

## 2020-01-01 PROCEDURE — 83605 ASSAY OF LACTIC ACID: CPT

## 2020-01-01 PROCEDURE — 85610 PROTHROMBIN TIME: CPT

## 2020-01-01 PROCEDURE — 4040F PNEUMOC VAC/ADMIN/RCVD: CPT | Performed by: INTERNAL MEDICINE

## 2020-01-01 PROCEDURE — 80051 ELECTROLYTE PANEL: CPT

## 2020-01-01 PROCEDURE — 84484 ASSAY OF TROPONIN QUANT: CPT

## 2020-01-01 PROCEDURE — 84295 ASSAY OF SERUM SODIUM: CPT

## 2020-01-01 PROCEDURE — 93005 ELECTROCARDIOGRAM TRACING: CPT | Performed by: INTERNAL MEDICINE

## 2020-01-01 PROCEDURE — 6360000002 HC RX W HCPCS: Performed by: INTERNAL MEDICINE

## 2020-01-01 PROCEDURE — 87077 CULTURE AEROBIC IDENTIFY: CPT

## 2020-01-01 PROCEDURE — 49905 OMENTAL FLAP INTRA-ABDOM: CPT | Performed by: SURGERY

## 2020-01-01 PROCEDURE — 6370000000 HC RX 637 (ALT 250 FOR IP): Performed by: INTERNAL MEDICINE

## 2020-01-01 PROCEDURE — 3700000000 HC ANESTHESIA ATTENDED CARE: Performed by: SURGERY

## 2020-01-01 PROCEDURE — 86900 BLOOD TYPING SEROLOGIC ABO: CPT

## 2020-01-01 PROCEDURE — 82570 ASSAY OF URINE CREATININE: CPT

## 2020-01-01 PROCEDURE — 80069 RENAL FUNCTION PANEL: CPT

## 2020-01-01 PROCEDURE — 3E0G76Z INTRODUCTION OF NUTRITIONAL SUBSTANCE INTO UPPER GI, VIA NATURAL OR ARTIFICIAL OPENING: ICD-10-PCS | Performed by: INTERNAL MEDICINE

## 2020-01-01 PROCEDURE — 3700000001 HC ADD 15 MINUTES (ANESTHESIA): Performed by: SURGERY

## 2020-01-01 PROCEDURE — 3600000014 HC SURGERY LEVEL 4 ADDTL 15MIN: Performed by: SURGERY

## 2020-01-01 PROCEDURE — 89220 SPUTUM SPECIMEN COLLECTION: CPT

## 2020-01-01 PROCEDURE — 2580000003 HC RX 258: Performed by: SURGERY

## 2020-01-01 PROCEDURE — 6360000002 HC RX W HCPCS: Performed by: STUDENT IN AN ORGANIZED HEALTH CARE EDUCATION/TRAINING PROGRAM

## 2020-01-01 PROCEDURE — 94640 AIRWAY INHALATION TREATMENT: CPT

## 2020-01-01 PROCEDURE — 1200000000 HC SEMI PRIVATE

## 2020-01-01 PROCEDURE — 7100000000 HC PACU RECOVERY - FIRST 15 MIN: Performed by: SURGERY

## 2020-01-01 PROCEDURE — 87070 CULTURE OTHR SPECIMN AEROBIC: CPT

## 2020-01-01 PROCEDURE — 85027 COMPLETE CBC AUTOMATED: CPT

## 2020-01-01 PROCEDURE — 2500000003 HC RX 250 WO HCPCS: Performed by: NURSE ANESTHETIST, CERTIFIED REGISTERED

## 2020-01-01 PROCEDURE — 0DJ08ZZ INSPECTION OF UPPER INTESTINAL TRACT, VIA NATURAL OR ARTIFICIAL OPENING ENDOSCOPIC: ICD-10-PCS | Performed by: SURGERY

## 2020-01-01 PROCEDURE — 84100 ASSAY OF PHOSPHORUS: CPT

## 2020-01-01 PROCEDURE — 2500000003 HC RX 250 WO HCPCS: Performed by: INTERNAL MEDICINE

## 2020-01-01 PROCEDURE — 43653 LAPAROSCOPY GASTROSTOMY: CPT | Performed by: SURGERY

## 2020-01-01 PROCEDURE — 82140 ASSAY OF AMMONIA: CPT

## 2020-01-01 PROCEDURE — 2580000003 HC RX 258: Performed by: INTERNAL MEDICINE

## 2020-01-01 PROCEDURE — 81001 URINALYSIS AUTO W/SCOPE: CPT

## 2020-01-01 PROCEDURE — 72125 CT NECK SPINE W/O DYE: CPT

## 2020-01-01 PROCEDURE — 93005 ELECTROCARDIOGRAM TRACING: CPT | Performed by: EMERGENCY MEDICINE

## 2020-01-01 PROCEDURE — 87040 BLOOD CULTURE FOR BACTERIA: CPT

## 2020-01-01 PROCEDURE — 6360000002 HC RX W HCPCS: Performed by: ANESTHESIOLOGY

## 2020-01-01 PROCEDURE — 80048 BASIC METABOLIC PNL TOTAL CA: CPT

## 2020-01-01 PROCEDURE — 99203 OFFICE O/P NEW LOW 30 MIN: CPT | Performed by: STUDENT IN AN ORGANIZED HEALTH CARE EDUCATION/TRAINING PROGRAM

## 2020-01-01 PROCEDURE — 74240 X-RAY XM UPR GI TRC 1CNTRST: CPT

## 2020-01-01 PROCEDURE — G8427 DOCREV CUR MEDS BY ELIG CLIN: HCPCS | Performed by: INTERNAL MEDICINE

## 2020-01-01 PROCEDURE — 80076 HEPATIC FUNCTION PANEL: CPT

## 2020-01-01 PROCEDURE — 82607 VITAMIN B-12: CPT

## 2020-01-01 PROCEDURE — 51798 US URINE CAPACITY MEASURE: CPT

## 2020-01-01 PROCEDURE — 2720000010 HC SURG SUPPLY STERILE: Performed by: SURGERY

## 2020-01-01 PROCEDURE — G8484 FLU IMMUNIZE NO ADMIN: HCPCS | Performed by: OTOLARYNGOLOGY

## 2020-01-01 PROCEDURE — 84443 ASSAY THYROID STIM HORMONE: CPT

## 2020-01-01 PROCEDURE — 1036F TOBACCO NON-USER: CPT | Performed by: OTOLARYNGOLOGY

## 2020-01-01 PROCEDURE — 97163 PT EVAL HIGH COMPLEX 45 MIN: CPT

## 2020-01-01 PROCEDURE — 2709999900 HC NON-CHARGEABLE SUPPLY: Performed by: SURGERY

## 2020-01-01 PROCEDURE — 93000 ELECTROCARDIOGRAM COMPLETE: CPT | Performed by: INTERNAL MEDICINE

## 2020-01-01 PROCEDURE — 96360 HYDRATION IV INFUSION INIT: CPT

## 2020-01-01 PROCEDURE — 2060000000 HC ICU INTERMEDIATE R&B

## 2020-01-01 PROCEDURE — 2700000000 HC OXYGEN THERAPY PER DAY

## 2020-01-01 PROCEDURE — 97530 THERAPEUTIC ACTIVITIES: CPT

## 2020-01-01 PROCEDURE — 3017F COLORECTAL CA SCREEN DOC REV: CPT | Performed by: OTOLARYNGOLOGY

## 2020-01-01 PROCEDURE — 99285 EMERGENCY DEPT VISIT HI MDM: CPT

## 2020-01-01 PROCEDURE — 1123F ACP DISCUSS/DSCN MKR DOCD: CPT | Performed by: OTOLARYNGOLOGY

## 2020-01-01 PROCEDURE — 0DH67UZ INSERTION OF FEEDING DEVICE INTO STOMACH, VIA NATURAL OR ARTIFICIAL OPENING: ICD-10-PCS | Performed by: INTERNAL MEDICINE

## 2020-01-01 PROCEDURE — 6370000000 HC RX 637 (ALT 250 FOR IP): Performed by: STUDENT IN AN ORGANIZED HEALTH CARE EDUCATION/TRAINING PROGRAM

## 2020-01-01 PROCEDURE — 7100000001 HC PACU RECOVERY - ADDTL 15 MIN: Performed by: SURGERY

## 2020-01-01 PROCEDURE — 69210 REMOVE IMPACTED EAR WAX UNI: CPT | Performed by: OTOLARYNGOLOGY

## 2020-01-01 PROCEDURE — 2580000003 HC RX 258: Performed by: EMERGENCY MEDICINE

## 2020-01-01 PROCEDURE — 0DH60UZ INSERTION OF FEEDING DEVICE INTO STOMACH, OPEN APPROACH: ICD-10-PCS | Performed by: SURGERY

## 2020-01-01 PROCEDURE — 74022 RADEX COMPL AQT ABD SERIES: CPT

## 2020-01-01 PROCEDURE — 93005 ELECTROCARDIOGRAM TRACING: CPT | Performed by: STUDENT IN AN ORGANIZED HEALTH CARE EDUCATION/TRAINING PROGRAM

## 2020-01-01 PROCEDURE — 87186 SC STD MICRODIL/AGAR DIL: CPT

## 2020-01-01 PROCEDURE — 43832 GSTRST OPEN W/CONSTJ TUBE: CPT | Performed by: SURGERY

## 2020-01-01 PROCEDURE — G8417 CALC BMI ABV UP PARAM F/U: HCPCS | Performed by: OTOLARYNGOLOGY

## 2020-01-01 PROCEDURE — 92526 ORAL FUNCTION THERAPY: CPT

## 2020-01-01 PROCEDURE — 2500000003 HC RX 250 WO HCPCS: Performed by: SURGERY

## 2020-01-01 PROCEDURE — 99283 EMERGENCY DEPT VISIT LOW MDM: CPT

## 2020-01-01 PROCEDURE — 0DH64UZ INSERTION OF FEEDING DEVICE INTO STOMACH, PERCUTANEOUS ENDOSCOPIC APPROACH: ICD-10-PCS | Performed by: SURGERY

## 2020-01-01 PROCEDURE — 2580000003 HC RX 258: Performed by: ANESTHESIOLOGY

## 2020-01-01 PROCEDURE — U0002 COVID-19 LAB TEST NON-CDC: HCPCS

## 2020-01-01 PROCEDURE — 6360000004 HC RX CONTRAST MEDICATION: Performed by: INTERNAL MEDICINE

## 2020-01-01 PROCEDURE — 99214 OFFICE O/P EST MOD 30 MIN: CPT | Performed by: INTERNAL MEDICINE

## 2020-01-01 PROCEDURE — 31720 CLEARANCE OF AIRWAYS: CPT

## 2020-01-01 PROCEDURE — 97164 PT RE-EVAL EST PLAN CARE: CPT

## 2020-01-01 PROCEDURE — 99223 1ST HOSP IP/OBS HIGH 75: CPT | Performed by: SURGERY

## 2020-01-01 PROCEDURE — 80164 ASSAY DIPROPYLACETIC ACD TOT: CPT

## 2020-01-01 PROCEDURE — 74176 CT ABD & PELVIS W/O CONTRAST: CPT

## 2020-01-01 PROCEDURE — 6360000002 HC RX W HCPCS: Performed by: NURSE ANESTHETIST, CERTIFIED REGISTERED

## 2020-01-01 PROCEDURE — 2500000003 HC RX 250 WO HCPCS: Performed by: STUDENT IN AN ORGANIZED HEALTH CARE EDUCATION/TRAINING PROGRAM

## 2020-01-01 PROCEDURE — 6370000000 HC RX 637 (ALT 250 FOR IP): Performed by: EMERGENCY MEDICINE

## 2020-01-01 PROCEDURE — C8929 TTE W OR WO FOL WCON,DOPPLER: HCPCS

## 2020-01-01 PROCEDURE — 82550 ASSAY OF CK (CPK): CPT

## 2020-01-01 PROCEDURE — 86901 BLOOD TYPING SEROLOGIC RH(D): CPT

## 2020-01-01 PROCEDURE — 6370000000 HC RX 637 (ALT 250 FOR IP)

## 2020-01-01 PROCEDURE — 3600000004 HC SURGERY LEVEL 4 BASE: Performed by: SURGERY

## 2020-01-01 PROCEDURE — 84133 ASSAY OF URINE POTASSIUM: CPT

## 2020-01-01 PROCEDURE — 1123F ACP DISCUSS/DSCN MKR DOCD: CPT | Performed by: INTERNAL MEDICINE

## 2020-01-01 PROCEDURE — 2580000003 HC RX 258: Performed by: NURSE ANESTHETIST, CERTIFIED REGISTERED

## 2020-01-01 PROCEDURE — 3017F COLORECTAL CA SCREEN DOC REV: CPT | Performed by: INTERNAL MEDICINE

## 2020-01-01 PROCEDURE — 0DU607Z SUPPLEMENT STOMACH WITH AUTOLOGOUS TISSUE SUBSTITUTE, OPEN APPROACH: ICD-10-PCS | Performed by: SURGERY

## 2020-01-01 PROCEDURE — 99203 OFFICE O/P NEW LOW 30 MIN: CPT | Performed by: OTOLARYNGOLOGY

## 2020-01-01 PROCEDURE — 86850 RBC ANTIBODY SCREEN: CPT

## 2020-01-01 PROCEDURE — 82803 BLOOD GASES ANY COMBINATION: CPT

## 2020-01-01 PROCEDURE — 99221 1ST HOSP IP/OBS SF/LOW 40: CPT | Performed by: NURSE PRACTITIONER

## 2020-01-01 PROCEDURE — 96365 THER/PROPH/DIAG IV INF INIT: CPT

## 2020-01-01 PROCEDURE — 84300 ASSAY OF URINE SODIUM: CPT

## 2020-01-01 PROCEDURE — 80053 COMPREHEN METABOLIC PANEL: CPT

## 2020-01-01 PROCEDURE — 97168 OT RE-EVAL EST PLAN CARE: CPT

## 2020-01-01 PROCEDURE — 83935 ASSAY OF URINE OSMOLALITY: CPT

## 2020-01-01 PROCEDURE — G8417 CALC BMI ABV UP PARAM F/U: HCPCS | Performed by: INTERNAL MEDICINE

## 2020-01-01 PROCEDURE — 2500000003 HC RX 250 WO HCPCS: Performed by: ANESTHESIOLOGY

## 2020-01-01 PROCEDURE — 83880 ASSAY OF NATRIURETIC PEPTIDE: CPT

## 2020-01-01 PROCEDURE — 43235 EGD DIAGNOSTIC BRUSH WASH: CPT | Performed by: SURGERY

## 2020-01-01 PROCEDURE — 74018 RADEX ABDOMEN 1 VIEW: CPT

## 2020-01-01 PROCEDURE — G8484 FLU IMMUNIZE NO ADMIN: HCPCS | Performed by: INTERNAL MEDICINE

## 2020-01-01 PROCEDURE — G8427 DOCREV CUR MEDS BY ELIG CLIN: HCPCS | Performed by: OTOLARYNGOLOGY

## 2020-01-01 PROCEDURE — L0450 TLSO FLEX TRUNK/THOR PRE OTS: HCPCS | Performed by: SURGERY

## 2020-01-01 PROCEDURE — 1036F TOBACCO NON-USER: CPT | Performed by: INTERNAL MEDICINE

## 2020-01-01 PROCEDURE — 96366 THER/PROPH/DIAG IV INF ADDON: CPT

## 2020-01-01 PROCEDURE — 12013 RPR F/E/E/N/L/M 2.6-5.0 CM: CPT

## 2020-01-01 PROCEDURE — 43241 EGD TUBE/CATH INSERTION: CPT | Performed by: SURGERY

## 2020-01-01 RX ORDER — SODIUM CHLORIDE FOR INHALATION 3 %
15 VIAL, NEBULIZER (ML) INHALATION 2 TIMES DAILY
Status: DISCONTINUED | OUTPATIENT
Start: 2020-01-01 | End: 2020-01-01 | Stop reason: HOSPADM

## 2020-01-01 RX ORDER — DEXTROSE MONOHYDRATE 50 MG/ML
INJECTION, SOLUTION INTRAVENOUS CONTINUOUS
Status: ACTIVE | OUTPATIENT
Start: 2020-01-01 | End: 2020-01-01

## 2020-01-01 RX ORDER — HYDROMORPHONE HCL 110MG/55ML
PATIENT CONTROLLED ANALGESIA SYRINGE INTRAVENOUS PRN
Status: DISCONTINUED | OUTPATIENT
Start: 2020-01-01 | End: 2020-01-01 | Stop reason: SDUPTHER

## 2020-01-01 RX ORDER — POTASSIUM CHLORIDE 20 MEQ/1
40 TABLET, EXTENDED RELEASE ORAL PRN
Status: DISCONTINUED | OUTPATIENT
Start: 2020-01-01 | End: 2020-01-01

## 2020-01-01 RX ORDER — POTASSIUM CHLORIDE 7.45 MG/ML
10 INJECTION INTRAVENOUS
Status: COMPLETED | OUTPATIENT
Start: 2020-01-01 | End: 2020-01-01

## 2020-01-01 RX ORDER — ESCITALOPRAM OXALATE 20 MG/1
20 TABLET ORAL DAILY
Status: DISCONTINUED | OUTPATIENT
Start: 2020-01-01 | End: 2020-01-01

## 2020-01-01 RX ORDER — ACETAMINOPHEN 325 MG/1
650 TABLET ORAL EVERY 4 HOURS PRN
Status: DISCONTINUED | OUTPATIENT
Start: 2020-01-01 | End: 2020-01-01

## 2020-01-01 RX ORDER — CEPHALEXIN 500 MG/1
500 CAPSULE ORAL ONCE
Status: COMPLETED | OUTPATIENT
Start: 2020-01-01 | End: 2020-01-01

## 2020-01-01 RX ORDER — MEPERIDINE HYDROCHLORIDE 25 MG/ML
12.5 INJECTION INTRAMUSCULAR; INTRAVENOUS; SUBCUTANEOUS EVERY 5 MIN PRN
Status: DISCONTINUED | OUTPATIENT
Start: 2020-01-01 | End: 2020-01-01 | Stop reason: HOSPADM

## 2020-01-01 RX ORDER — UBIDECARENONE 75 MG
100 CAPSULE ORAL DAILY
Status: ON HOLD | COMMUNITY
End: 2020-01-01 | Stop reason: HOSPADM

## 2020-01-01 RX ORDER — SODIUM CHLORIDE, SODIUM LACTATE, POTASSIUM CHLORIDE, AND CALCIUM CHLORIDE .6; .31; .03; .02 G/100ML; G/100ML; G/100ML; G/100ML
1000 INJECTION, SOLUTION INTRAVENOUS ONCE
Status: COMPLETED | OUTPATIENT
Start: 2020-01-01 | End: 2020-01-01

## 2020-01-01 RX ORDER — CLOPIDOGREL BISULFATE 75 MG/1
75 TABLET ORAL DAILY
Status: DISCONTINUED | OUTPATIENT
Start: 2020-01-01 | End: 2020-01-01

## 2020-01-01 RX ORDER — ACETAMINOPHEN 325 MG/1
650 TABLET ORAL EVERY 6 HOURS PRN
Status: DISCONTINUED | OUTPATIENT
Start: 2020-01-01 | End: 2020-01-01

## 2020-01-01 RX ORDER — ROCURONIUM BROMIDE 10 MG/ML
INJECTION, SOLUTION INTRAVENOUS PRN
Status: DISCONTINUED | OUTPATIENT
Start: 2020-01-01 | End: 2020-01-01 | Stop reason: SDUPTHER

## 2020-01-01 RX ORDER — DEXTROSE, SODIUM CHLORIDE, AND POTASSIUM CHLORIDE 5; .45; .15 G/100ML; G/100ML; G/100ML
INJECTION INTRAVENOUS CONTINUOUS
Status: DISCONTINUED | OUTPATIENT
Start: 2020-01-01 | End: 2020-01-01

## 2020-01-01 RX ORDER — PROMETHAZINE HYDROCHLORIDE 25 MG/ML
6.25 INJECTION, SOLUTION INTRAMUSCULAR; INTRAVENOUS
Status: DISCONTINUED | OUTPATIENT
Start: 2020-01-01 | End: 2020-01-01 | Stop reason: HOSPADM

## 2020-01-01 RX ORDER — QUETIAPINE FUMARATE 25 MG/1
25 TABLET, FILM COATED ORAL NIGHTLY
Status: DISCONTINUED | OUTPATIENT
Start: 2020-01-01 | End: 2020-01-01

## 2020-01-01 RX ORDER — LACTOBACILLUS RHAMNOSUS GG 10B CELL
1 CAPSULE ORAL DAILY
Status: DISCONTINUED | OUTPATIENT
Start: 2020-01-01 | End: 2020-01-01

## 2020-01-01 RX ORDER — METOCLOPRAMIDE HYDROCHLORIDE 5 MG/ML
10 INJECTION INTRAMUSCULAR; INTRAVENOUS
Status: DISCONTINUED | OUTPATIENT
Start: 2020-01-01 | End: 2020-01-01 | Stop reason: HOSPADM

## 2020-01-01 RX ORDER — FENTANYL CITRATE 50 UG/ML
50 INJECTION, SOLUTION INTRAMUSCULAR; INTRAVENOUS EVERY 5 MIN PRN
Status: DISCONTINUED | OUTPATIENT
Start: 2020-01-01 | End: 2020-01-01 | Stop reason: HOSPADM

## 2020-01-01 RX ORDER — SODIUM CHLORIDE 0.9 % (FLUSH) 0.9 %
10 SYRINGE (ML) INJECTION EVERY 12 HOURS SCHEDULED
Status: DISCONTINUED | OUTPATIENT
Start: 2020-01-01 | End: 2020-01-01

## 2020-01-01 RX ORDER — CEPHALEXIN 250 MG/5ML
500 POWDER, FOR SUSPENSION ORAL 3 TIMES DAILY
Qty: 210 ML | Refills: 0 | Status: SHIPPED | OUTPATIENT
Start: 2020-01-01 | End: 2020-01-01

## 2020-01-01 RX ORDER — MAGNESIUM SULFATE IN WATER 40 MG/ML
2 INJECTION, SOLUTION INTRAVENOUS ONCE
Status: COMPLETED | OUTPATIENT
Start: 2020-01-01 | End: 2020-01-01

## 2020-01-01 RX ORDER — OXYCODONE HCL 5 MG/5 ML
10 SOLUTION, ORAL ORAL EVERY 6 HOURS PRN
Status: DISCONTINUED | OUTPATIENT
Start: 2020-01-01 | End: 2020-01-01

## 2020-01-01 RX ORDER — GLYCOPYRROLATE 0.2 MG/ML
INJECTION INTRAMUSCULAR; INTRAVENOUS PRN
Status: DISCONTINUED | OUTPATIENT
Start: 2020-01-01 | End: 2020-01-01 | Stop reason: SDUPTHER

## 2020-01-01 RX ORDER — SODIUM CHLORIDE 0.9 % (FLUSH) 0.9 %
10 SYRINGE (ML) INJECTION PRN
Status: DISCONTINUED | OUTPATIENT
Start: 2020-01-01 | End: 2020-01-01

## 2020-01-01 RX ORDER — FENTANYL CITRATE 50 UG/ML
25 INJECTION, SOLUTION INTRAMUSCULAR; INTRAVENOUS EVERY 5 MIN PRN
Status: DISCONTINUED | OUTPATIENT
Start: 2020-01-01 | End: 2020-01-01 | Stop reason: HOSPADM

## 2020-01-01 RX ORDER — IPRATROPIUM BROMIDE AND ALBUTEROL SULFATE 2.5; .5 MG/3ML; MG/3ML
1 SOLUTION RESPIRATORY (INHALATION) PRN
Status: DISCONTINUED | OUTPATIENT
Start: 2020-01-01 | End: 2020-01-01

## 2020-01-01 RX ORDER — CLOPIDOGREL BISULFATE 75 MG/1
75 TABLET ORAL DAILY
Qty: 90 TABLET | Refills: 3 | Status: ON HOLD | OUTPATIENT
Start: 2020-01-01 | End: 2020-01-01 | Stop reason: HOSPADM

## 2020-01-01 RX ORDER — SODIUM CHLORIDE, SODIUM LACTATE, POTASSIUM CHLORIDE, CALCIUM CHLORIDE 600; 310; 30; 20 MG/100ML; MG/100ML; MG/100ML; MG/100ML
1000 INJECTION, SOLUTION INTRAVENOUS ONCE
Status: COMPLETED | OUTPATIENT
Start: 2020-01-01 | End: 2020-01-01

## 2020-01-01 RX ORDER — CEFAZOLIN SODIUM 1 G/3ML
INJECTION, POWDER, FOR SOLUTION INTRAMUSCULAR; INTRAVENOUS PRN
Status: DISCONTINUED | OUTPATIENT
Start: 2020-01-01 | End: 2020-01-01 | Stop reason: SDUPTHER

## 2020-01-01 RX ORDER — SODIUM CHLORIDE 0.9 % (FLUSH) 0.9 %
10 SYRINGE (ML) INJECTION EVERY 12 HOURS SCHEDULED
Status: DISCONTINUED | OUTPATIENT
Start: 2020-01-01 | End: 2020-01-01 | Stop reason: HOSPADM

## 2020-01-01 RX ORDER — QUETIAPINE FUMARATE 25 MG/1
25 TABLET, FILM COATED ORAL NIGHTLY
Qty: 30 TABLET | Refills: 2 | Status: ON HOLD | OUTPATIENT
Start: 2020-01-01 | End: 2020-01-01 | Stop reason: SDUPTHER

## 2020-01-01 RX ORDER — MORPHINE SULFATE 4 MG/ML
1 INJECTION, SOLUTION INTRAMUSCULAR; INTRAVENOUS EVERY 5 MIN PRN
Status: DISCONTINUED | OUTPATIENT
Start: 2020-01-01 | End: 2020-01-01 | Stop reason: HOSPADM

## 2020-01-01 RX ORDER — METOPROLOL TARTRATE 5 MG/5ML
5 INJECTION INTRAVENOUS EVERY 5 MIN PRN
Status: DISCONTINUED | OUTPATIENT
Start: 2020-01-01 | End: 2020-01-01 | Stop reason: HOSPADM

## 2020-01-01 RX ORDER — ONDANSETRON 2 MG/ML
4 INJECTION INTRAMUSCULAR; INTRAVENOUS EVERY 6 HOURS PRN
Status: DISCONTINUED | OUTPATIENT
Start: 2020-01-01 | End: 2020-01-01 | Stop reason: HOSPADM

## 2020-01-01 RX ORDER — ONDANSETRON 2 MG/ML
4 INJECTION INTRAMUSCULAR; INTRAVENOUS
Status: DISCONTINUED | OUTPATIENT
Start: 2020-01-01 | End: 2020-01-01 | Stop reason: HOSPADM

## 2020-01-01 RX ORDER — 0.9 % SODIUM CHLORIDE 0.9 %
500 INTRAVENOUS SOLUTION INTRAVENOUS
Status: DISCONTINUED | OUTPATIENT
Start: 2020-01-01 | End: 2020-01-01 | Stop reason: HOSPADM

## 2020-01-01 RX ORDER — MAGNESIUM SULFATE IN WATER 40 MG/ML
2 INJECTION, SOLUTION INTRAVENOUS PRN
Status: DISCONTINUED | OUTPATIENT
Start: 2020-01-01 | End: 2020-01-01 | Stop reason: HOSPADM

## 2020-01-01 RX ORDER — LIDOCAINE HYDROCHLORIDE 20 MG/ML
INJECTION, SOLUTION INTRAVENOUS PRN
Status: DISCONTINUED | OUTPATIENT
Start: 2020-01-01 | End: 2020-01-01 | Stop reason: SDUPTHER

## 2020-01-01 RX ORDER — LIDOCAINE HYDROCHLORIDE AND EPINEPHRINE 10; 10 MG/ML; UG/ML
5 INJECTION, SOLUTION INFILTRATION; PERINEURAL ONCE
Status: COMPLETED | OUTPATIENT
Start: 2020-01-01 | End: 2020-01-01

## 2020-01-01 RX ORDER — IPRATROPIUM BROMIDE AND ALBUTEROL SULFATE 2.5; .5 MG/3ML; MG/3ML
1 SOLUTION RESPIRATORY (INHALATION) 3 TIMES DAILY
Status: DISCONTINUED | OUTPATIENT
Start: 2020-01-01 | End: 2020-01-01

## 2020-01-01 RX ORDER — ALBUTEROL SULFATE 2.5 MG/3ML
2.5 SOLUTION RESPIRATORY (INHALATION) EVERY 4 HOURS PRN
Status: DISCONTINUED | OUTPATIENT
Start: 2020-01-01 | End: 2020-01-01 | Stop reason: HOSPADM

## 2020-01-01 RX ORDER — POTASSIUM CHLORIDE 7.45 MG/ML
10 INJECTION INTRAVENOUS
Status: DISPENSED | OUTPATIENT
Start: 2020-01-01 | End: 2020-01-01

## 2020-01-01 RX ORDER — LABETALOL 20 MG/4 ML (5 MG/ML) INTRAVENOUS SYRINGE
5 EVERY 10 MIN PRN
Status: DISCONTINUED | OUTPATIENT
Start: 2020-01-01 | End: 2020-01-01 | Stop reason: HOSPADM

## 2020-01-01 RX ORDER — PROMETHAZINE HYDROCHLORIDE 12.5 MG/1
12.5 TABLET ORAL EVERY 6 HOURS PRN
Status: DISCONTINUED | OUTPATIENT
Start: 2020-01-01 | End: 2020-01-01 | Stop reason: HOSPADM

## 2020-01-01 RX ORDER — IPRATROPIUM BROMIDE AND ALBUTEROL SULFATE 2.5; .5 MG/3ML; MG/3ML
1 SOLUTION RESPIRATORY (INHALATION) EVERY 4 HOURS PRN
Status: DISCONTINUED | OUTPATIENT
Start: 2020-01-01 | End: 2020-01-01 | Stop reason: HOSPADM

## 2020-01-01 RX ORDER — IPRATROPIUM BROMIDE AND ALBUTEROL SULFATE 2.5; .5 MG/3ML; MG/3ML
1 SOLUTION RESPIRATORY (INHALATION) 2 TIMES DAILY
Status: DISCONTINUED | OUTPATIENT
Start: 2020-01-01 | End: 2020-01-01

## 2020-01-01 RX ORDER — CIPROFLOXACIN 500 MG/1
500 TABLET, FILM COATED ORAL 2 TIMES DAILY
Qty: 20 TABLET | Refills: 0 | Status: SHIPPED | OUTPATIENT
Start: 2020-01-01 | End: 2020-01-01

## 2020-01-01 RX ORDER — SODIUM CHLORIDE 9 MG/ML
INJECTION, SOLUTION INTRAVENOUS CONTINUOUS
Status: DISCONTINUED | OUTPATIENT
Start: 2020-01-01 | End: 2020-01-01

## 2020-01-01 RX ORDER — SODIUM CHLORIDE, SODIUM LACTATE, POTASSIUM CHLORIDE, AND CALCIUM CHLORIDE .6; .31; .03; .02 G/100ML; G/100ML; G/100ML; G/100ML
500 INJECTION, SOLUTION INTRAVENOUS ONCE
Status: COMPLETED | OUTPATIENT
Start: 2020-01-01 | End: 2020-01-01

## 2020-01-01 RX ORDER — METOCLOPRAMIDE HYDROCHLORIDE 5 MG/ML
5 INJECTION INTRAMUSCULAR; INTRAVENOUS EVERY 6 HOURS
Status: DISCONTINUED | OUTPATIENT
Start: 2020-01-01 | End: 2020-01-01 | Stop reason: HOSPADM

## 2020-01-01 RX ORDER — CEFDINIR 300 MG/1
300 CAPSULE ORAL 2 TIMES DAILY
Qty: 10 CAPSULE | Refills: 0 | Status: SHIPPED | OUTPATIENT
Start: 2020-01-01 | End: 2020-01-01

## 2020-01-01 RX ORDER — IPRATROPIUM BROMIDE AND ALBUTEROL SULFATE 2.5; .5 MG/3ML; MG/3ML
1 SOLUTION RESPIRATORY (INHALATION)
Status: DISCONTINUED | OUTPATIENT
Start: 2020-01-01 | End: 2020-01-01

## 2020-01-01 RX ORDER — SODIUM CHLORIDE, SODIUM LACTATE, POTASSIUM CHLORIDE, CALCIUM CHLORIDE 600; 310; 30; 20 MG/100ML; MG/100ML; MG/100ML; MG/100ML
INJECTION, SOLUTION INTRAVENOUS CONTINUOUS PRN
Status: DISCONTINUED | OUTPATIENT
Start: 2020-01-01 | End: 2020-01-01 | Stop reason: SDUPTHER

## 2020-01-01 RX ORDER — GINSENG 100 MG
CAPSULE ORAL ONCE
Status: COMPLETED | OUTPATIENT
Start: 2020-01-01 | End: 2020-01-01

## 2020-01-01 RX ORDER — ACETAMINOPHEN 650 MG/1
650 SUPPOSITORY RECTAL EVERY 6 HOURS PRN
Status: DISCONTINUED | OUTPATIENT
Start: 2020-01-01 | End: 2020-01-01

## 2020-01-01 RX ORDER — BISACODYL 10 MG
10 SUPPOSITORY, RECTAL RECTAL ONCE
Status: COMPLETED | OUTPATIENT
Start: 2020-01-01 | End: 2020-01-01

## 2020-01-01 RX ORDER — MAGNESIUM SULFATE IN WATER 40 MG/ML
4 INJECTION, SOLUTION INTRAVENOUS ONCE
Status: COMPLETED | OUTPATIENT
Start: 2020-01-01 | End: 2020-01-01

## 2020-01-01 RX ORDER — ACETAMINOPHEN 325 MG/1
650 TABLET ORAL EVERY 6 HOURS PRN
Status: DISCONTINUED | OUTPATIENT
Start: 2020-01-01 | End: 2020-01-01 | Stop reason: HOSPADM

## 2020-01-01 RX ORDER — ACETAMINOPHEN 650 MG/1
650 SUPPOSITORY RECTAL EVERY 6 HOURS PRN
Status: DISCONTINUED | OUTPATIENT
Start: 2020-01-01 | End: 2020-01-01 | Stop reason: HOSPADM

## 2020-01-01 RX ORDER — ONDANSETRON 4 MG/1
4 TABLET, ORALLY DISINTEGRATING ORAL EVERY 8 HOURS PRN
Qty: 30 TABLET | Refills: 0 | Status: SHIPPED | OUTPATIENT
Start: 2020-01-01

## 2020-01-01 RX ORDER — OXYCODONE HCL 5 MG/5 ML
5 SOLUTION, ORAL ORAL EVERY 4 HOURS PRN
Status: DISCONTINUED | OUTPATIENT
Start: 2020-01-01 | End: 2020-01-01 | Stop reason: HOSPADM

## 2020-01-01 RX ORDER — DEXTROSE AND SODIUM CHLORIDE 5; .45 G/100ML; G/100ML
INJECTION, SOLUTION INTRAVENOUS CONTINUOUS
Status: ACTIVE | OUTPATIENT
Start: 2020-01-01 | End: 2020-01-01

## 2020-01-01 RX ORDER — POLYETHYLENE GLYCOL 3350 17 G/17G
17 POWDER, FOR SOLUTION ORAL DAILY PRN
Status: DISCONTINUED | OUTPATIENT
Start: 2020-01-01 | End: 2020-01-01

## 2020-01-01 RX ORDER — PROPOFOL 10 MG/ML
INJECTION, EMULSION INTRAVENOUS PRN
Status: DISCONTINUED | OUTPATIENT
Start: 2020-01-01 | End: 2020-01-01 | Stop reason: SDUPTHER

## 2020-01-01 RX ORDER — METOCLOPRAMIDE HYDROCHLORIDE 5 MG/ML
5 INJECTION INTRAMUSCULAR; INTRAVENOUS EVERY 8 HOURS
Status: DISCONTINUED | OUTPATIENT
Start: 2020-01-01 | End: 2020-01-01

## 2020-01-01 RX ORDER — QUETIAPINE FUMARATE 25 MG/1
25 TABLET, FILM COATED ORAL NIGHTLY PRN
Qty: 3 TABLET | Refills: 0 | Status: SHIPPED | OUTPATIENT
Start: 2020-01-01 | End: 2020-01-01

## 2020-01-01 RX ORDER — MEMANTINE HYDROCHLORIDE 10 MG/1
10 TABLET ORAL 2 TIMES DAILY
Status: DISCONTINUED | OUTPATIENT
Start: 2020-01-01 | End: 2020-01-01

## 2020-01-01 RX ORDER — DIPHENHYDRAMINE HYDROCHLORIDE 50 MG/ML
12.5 INJECTION INTRAMUSCULAR; INTRAVENOUS
Status: DISCONTINUED | OUTPATIENT
Start: 2020-01-01 | End: 2020-01-01 | Stop reason: HOSPADM

## 2020-01-01 RX ORDER — OXYCODONE HYDROCHLORIDE 5 MG/1
10 TABLET ORAL PRN
Status: DISCONTINUED | OUTPATIENT
Start: 2020-01-01 | End: 2020-01-01 | Stop reason: HOSPADM

## 2020-01-01 RX ORDER — IPRATROPIUM BROMIDE AND ALBUTEROL SULFATE 2.5; .5 MG/3ML; MG/3ML
1 SOLUTION RESPIRATORY (INHALATION) EVERY 4 HOURS PRN
Status: DISCONTINUED | OUTPATIENT
Start: 2020-01-01 | End: 2020-01-01

## 2020-01-01 RX ORDER — BISACODYL 10 MG
10 SUPPOSITORY, RECTAL RECTAL DAILY PRN
Qty: 10 SUPPOSITORY | Refills: 0 | Status: SHIPPED | OUTPATIENT
Start: 2020-01-01 | End: 2020-01-01

## 2020-01-01 RX ORDER — SODIUM CHLORIDE 450 MG/100ML
INJECTION, SOLUTION INTRAVENOUS CONTINUOUS
Status: DISCONTINUED | OUTPATIENT
Start: 2020-01-01 | End: 2020-01-01

## 2020-01-01 RX ORDER — SODIUM CHLORIDE, SODIUM LACTATE, POTASSIUM CHLORIDE, AND CALCIUM CHLORIDE .6; .31; .03; .02 G/100ML; G/100ML; G/100ML; G/100ML
IRRIGANT IRRIGATION PRN
Status: DISCONTINUED | OUTPATIENT
Start: 2020-01-01 | End: 2020-01-01 | Stop reason: ALTCHOICE

## 2020-01-01 RX ORDER — BUPIVACAINE HYDROCHLORIDE 5 MG/ML
INJECTION, SOLUTION EPIDURAL; INTRACAUDAL PRN
Status: DISCONTINUED | OUTPATIENT
Start: 2020-01-01 | End: 2020-01-01 | Stop reason: ALTCHOICE

## 2020-01-01 RX ORDER — DEXAMETHASONE SODIUM PHOSPHATE 10 MG/ML
INJECTION, SOLUTION INTRAMUSCULAR; INTRAVENOUS PRN
Status: DISCONTINUED | OUTPATIENT
Start: 2020-01-01 | End: 2020-01-01 | Stop reason: SDUPTHER

## 2020-01-01 RX ORDER — POTASSIUM CHLORIDE 7.45 MG/ML
10 INJECTION INTRAVENOUS PRN
Status: DISCONTINUED | OUTPATIENT
Start: 2020-01-01 | End: 2020-01-01

## 2020-01-01 RX ORDER — LOSARTAN POTASSIUM 100 MG/1
100 TABLET ORAL DAILY
Status: DISCONTINUED | OUTPATIENT
Start: 2020-01-01 | End: 2020-01-01

## 2020-01-01 RX ORDER — ONDANSETRON 4 MG/1
4 TABLET, ORALLY DISINTEGRATING ORAL EVERY 8 HOURS PRN
Status: DISCONTINUED | OUTPATIENT
Start: 2020-01-01 | End: 2020-01-01 | Stop reason: HOSPADM

## 2020-01-01 RX ORDER — SODIUM CHLORIDE 0.9 % (FLUSH) 0.9 %
10 SYRINGE (ML) INJECTION PRN
Status: DISCONTINUED | OUTPATIENT
Start: 2020-01-01 | End: 2020-01-01 | Stop reason: HOSPADM

## 2020-01-01 RX ORDER — MAGNESIUM HYDROXIDE 1200 MG/15ML
LIQUID ORAL CONTINUOUS PRN
Status: COMPLETED | OUTPATIENT
Start: 2020-01-01 | End: 2020-01-01

## 2020-01-01 RX ORDER — SODIUM CHLORIDE 9 MG/ML
INJECTION, SOLUTION INTRAVENOUS CONTINUOUS PRN
Status: DISCONTINUED | OUTPATIENT
Start: 2020-01-01 | End: 2020-01-01 | Stop reason: SDUPTHER

## 2020-01-01 RX ORDER — DIGOXIN 0.25 MG/ML
500 INJECTION INTRAMUSCULAR; INTRAVENOUS ONCE
Status: COMPLETED | OUTPATIENT
Start: 2020-01-01 | End: 2020-01-01

## 2020-01-01 RX ORDER — ALBUTEROL SULFATE 2.5 MG/3ML
2.5 SOLUTION RESPIRATORY (INHALATION) 3 TIMES DAILY
Status: DISCONTINUED | OUTPATIENT
Start: 2020-01-01 | End: 2020-01-01

## 2020-01-01 RX ORDER — HYDRALAZINE HYDROCHLORIDE 20 MG/ML
5 INJECTION INTRAMUSCULAR; INTRAVENOUS EVERY 10 MIN PRN
Status: DISCONTINUED | OUTPATIENT
Start: 2020-01-01 | End: 2020-01-01 | Stop reason: HOSPADM

## 2020-01-01 RX ORDER — RISPERIDONE 0.25 MG/1
0.25 TABLET, FILM COATED ORAL 2 TIMES DAILY
Qty: 60 TABLET | Refills: 2 | Status: SHIPPED | OUTPATIENT
Start: 2020-01-01 | End: 2020-01-01

## 2020-01-01 RX ORDER — IPRATROPIUM BROMIDE AND ALBUTEROL SULFATE 2.5; .5 MG/3ML; MG/3ML
1 SOLUTION RESPIRATORY (INHALATION) 4 TIMES DAILY
Status: DISCONTINUED | OUTPATIENT
Start: 2020-01-01 | End: 2020-01-01

## 2020-01-01 RX ORDER — OXYCODONE HYDROCHLORIDE 5 MG/1
5 TABLET ORAL PRN
Status: DISCONTINUED | OUTPATIENT
Start: 2020-01-01 | End: 2020-01-01 | Stop reason: HOSPADM

## 2020-01-01 RX ORDER — OXYCODONE HCL 5 MG/5 ML
5 SOLUTION, ORAL ORAL EVERY 6 HOURS PRN
Status: DISCONTINUED | OUTPATIENT
Start: 2020-01-01 | End: 2020-01-01

## 2020-01-01 RX ORDER — SODIUM CHLORIDE, SODIUM LACTATE, POTASSIUM CHLORIDE, CALCIUM CHLORIDE 600; 310; 30; 20 MG/100ML; MG/100ML; MG/100ML; MG/100ML
INJECTION, SOLUTION INTRAVENOUS CONTINUOUS PRN
Status: DISCONTINUED | OUTPATIENT
Start: 2020-01-01 | End: 2020-01-01

## 2020-01-01 RX ORDER — PROMETHAZINE HYDROCHLORIDE 12.5 MG/1
12.5 TABLET ORAL EVERY 6 HOURS PRN
Status: DISCONTINUED | OUTPATIENT
Start: 2020-01-01 | End: 2020-01-01

## 2020-01-01 RX ORDER — ACETAMINOPHEN 650 MG/1
650 SUPPOSITORY RECTAL EVERY 4 HOURS PRN
Status: DISCONTINUED | OUTPATIENT
Start: 2020-01-01 | End: 2020-01-01 | Stop reason: HOSPADM

## 2020-01-01 RX ORDER — METOCLOPRAMIDE 5 MG/1
5 TABLET ORAL 4 TIMES DAILY
Qty: 120 TABLET | Refills: 3 | Status: SHIPPED | OUTPATIENT
Start: 2020-01-01

## 2020-01-01 RX ORDER — ONDANSETRON 2 MG/ML
4 INJECTION INTRAMUSCULAR; INTRAVENOUS EVERY 6 HOURS PRN
Status: DISCONTINUED | OUTPATIENT
Start: 2020-01-01 | End: 2020-01-01

## 2020-01-01 RX ORDER — BISACODYL 10 MG
10 SUPPOSITORY, RECTAL RECTAL DAILY PRN
Status: DISCONTINUED | OUTPATIENT
Start: 2020-01-01 | End: 2020-01-01 | Stop reason: HOSPADM

## 2020-01-01 RX ADMIN — CEFAZOLIN SODIUM 2000 MG: 1 POWDER, FOR SOLUTION INTRAMUSCULAR; INTRAVENOUS at 23:31

## 2020-01-01 RX ADMIN — ENOXAPARIN SODIUM 40 MG: 40 INJECTION SUBCUTANEOUS at 08:52

## 2020-01-01 RX ADMIN — DIGOXIN 500 MCG: 250 INJECTION, SOLUTION INTRAMUSCULAR; INTRAVENOUS; PARENTERAL at 14:39

## 2020-01-01 RX ADMIN — ROCURONIUM BROMIDE 50 MG: 10 INJECTION, SOLUTION INTRAVENOUS at 15:52

## 2020-01-01 RX ADMIN — CEFTRIAXONE 1 G: 1 INJECTION, POWDER, FOR SOLUTION INTRAMUSCULAR; INTRAVENOUS at 13:51

## 2020-01-01 RX ADMIN — POTASSIUM CHLORIDE 10 MEQ: 10 INJECTION, SOLUTION INTRAVENOUS at 00:47

## 2020-01-01 RX ADMIN — POTASSIUM CHLORIDE 10 MEQ: 7.46 INJECTION, SOLUTION INTRAVENOUS at 05:38

## 2020-01-01 RX ADMIN — ROCURONIUM BROMIDE 20 MG: 10 INJECTION, SOLUTION INTRAVENOUS at 23:33

## 2020-01-01 RX ADMIN — SUGAMMADEX 200 MG: 100 INJECTION, SOLUTION INTRAVENOUS at 16:55

## 2020-01-01 RX ADMIN — POTASSIUM CHLORIDE 10 MEQ: 10 INJECTION, SOLUTION INTRAVENOUS at 22:12

## 2020-01-01 RX ADMIN — ROCURONIUM BROMIDE 10 MG: 10 INJECTION, SOLUTION INTRAVENOUS at 23:38

## 2020-01-01 RX ADMIN — DEXTROSE MONOHYDRATE: 50 INJECTION, SOLUTION INTRAVENOUS at 18:29

## 2020-01-01 RX ADMIN — HYDROMORPHONE HYDROCHLORIDE 0.2 MG: 2 INJECTION, SOLUTION INTRAMUSCULAR; INTRAVENOUS; SUBCUTANEOUS at 23:55

## 2020-01-01 RX ADMIN — DEXTROSE MONOHYDRATE, SODIUM CHLORIDE, AND POTASSIUM CHLORIDE: 50; 4.5; 1.49 INJECTION, SOLUTION INTRAVENOUS at 22:15

## 2020-01-01 RX ADMIN — ENOXAPARIN SODIUM 40 MG: 40 INJECTION SUBCUTANEOUS at 10:18

## 2020-01-01 RX ADMIN — BACITRACIN: 500 OINTMENT TOPICAL at 19:05

## 2020-01-01 RX ADMIN — ENOXAPARIN SODIUM 40 MG: 40 INJECTION SUBCUTANEOUS at 20:43

## 2020-01-01 RX ADMIN — IPRATROPIUM BROMIDE AND ALBUTEROL SULFATE 1 AMPULE: .5; 3 SOLUTION RESPIRATORY (INHALATION) at 19:36

## 2020-01-01 RX ADMIN — SODIUM CHLORIDE, POTASSIUM CHLORIDE, SODIUM LACTATE AND CALCIUM CHLORIDE 1000 ML: 600; 310; 30; 20 INJECTION, SOLUTION INTRAVENOUS at 15:31

## 2020-01-01 RX ADMIN — LOSARTAN POTASSIUM 100 MG: 100 TABLET, FILM COATED ORAL at 10:00

## 2020-01-01 RX ADMIN — HYDROMORPHONE HYDROCHLORIDE 0.2 MG: 2 INJECTION, SOLUTION INTRAMUSCULAR; INTRAVENOUS; SUBCUTANEOUS at 01:08

## 2020-01-01 RX ADMIN — GLYCOPYRROLATE 0.4 MG: 0.2 INJECTION, SOLUTION INTRAMUSCULAR; INTRAVENOUS at 15:48

## 2020-01-01 RX ADMIN — ONDANSETRON 4 MG: 2 INJECTION INTRAMUSCULAR; INTRAVENOUS at 15:37

## 2020-01-01 RX ADMIN — HYDROMORPHONE HYDROCHLORIDE 0.4 MG: 2 INJECTION, SOLUTION INTRAMUSCULAR; INTRAVENOUS; SUBCUTANEOUS at 01:23

## 2020-01-01 RX ADMIN — IPRATROPIUM BROMIDE AND ALBUTEROL SULFATE 1 AMPULE: .5; 3 SOLUTION RESPIRATORY (INHALATION) at 11:59

## 2020-01-01 RX ADMIN — METOCLOPRAMIDE HYDROCHLORIDE 5 MG: 5 INJECTION INTRAMUSCULAR; INTRAVENOUS at 19:50

## 2020-01-01 RX ADMIN — METOCLOPRAMIDE HYDROCHLORIDE 5 MG: 5 INJECTION INTRAMUSCULAR; INTRAVENOUS at 01:22

## 2020-01-01 RX ADMIN — SODIUM CHLORIDE: 4.5 INJECTION, SOLUTION INTRAVENOUS at 06:17

## 2020-01-01 RX ADMIN — Medication 10 ML: at 20:49

## 2020-01-01 RX ADMIN — HYDROMORPHONE HYDROCHLORIDE 0.2 MG: 2 INJECTION, SOLUTION INTRAMUSCULAR; INTRAVENOUS; SUBCUTANEOUS at 00:45

## 2020-01-01 RX ADMIN — SODIUM CHLORIDE: 4.5 INJECTION, SOLUTION INTRAVENOUS at 23:20

## 2020-01-01 RX ADMIN — POTASSIUM PHOSPHATE, MONOBASIC AND POTASSIUM PHOSPHATE, DIBASIC 10 MMOL: 224; 236 INJECTION, SOLUTION, CONCENTRATE INTRAVENOUS at 10:35

## 2020-01-01 RX ADMIN — METOCLOPRAMIDE HYDROCHLORIDE 5 MG: 5 INJECTION INTRAMUSCULAR; INTRAVENOUS at 08:39

## 2020-01-01 RX ADMIN — PIPERACILLIN AND TAZOBACTAM 3.38 G: 3; .375 INJECTION, POWDER, LYOPHILIZED, FOR SOLUTION INTRAVENOUS at 08:42

## 2020-01-01 RX ADMIN — DOCUSATE SODIUM 100 MG: 50 LIQUID ORAL at 10:00

## 2020-01-01 RX ADMIN — POTASSIUM CHLORIDE 10 MEQ: 10 INJECTION, SOLUTION INTRAVENOUS at 23:25

## 2020-01-01 RX ADMIN — LIDOCAINE HYDROCHLORIDE,EPINEPHRINE BITARTRATE 5 ML: 10; .01 INJECTION, SOLUTION INFILTRATION; PERINEURAL at 17:15

## 2020-01-01 RX ADMIN — SODIUM CHLORIDE, POTASSIUM CHLORIDE, SODIUM LACTATE AND CALCIUM CHLORIDE 500 ML: 600; 310; 30; 20 INJECTION, SOLUTION INTRAVENOUS at 14:25

## 2020-01-01 RX ADMIN — DEXTROSE MONOHYDRATE, SODIUM CHLORIDE, AND POTASSIUM CHLORIDE: 50; 4.5; 1.49 INJECTION, SOLUTION INTRAVENOUS at 17:26

## 2020-01-01 RX ADMIN — IPRATROPIUM BROMIDE AND ALBUTEROL SULFATE 1 AMPULE: .5; 3 SOLUTION RESPIRATORY (INHALATION) at 08:08

## 2020-01-01 RX ADMIN — POTASSIUM CHLORIDE 10 MEQ: 10 INJECTION, SOLUTION INTRAVENOUS at 02:55

## 2020-01-01 RX ADMIN — DEXTROSE AND SODIUM CHLORIDE: 5; 450 INJECTION, SOLUTION INTRAVENOUS at 22:08

## 2020-01-01 RX ADMIN — POTASSIUM CHLORIDE 10 MEQ: 10 INJECTION, SOLUTION INTRAVENOUS at 08:50

## 2020-01-01 RX ADMIN — IPRATROPIUM BROMIDE AND ALBUTEROL SULFATE 1 AMPULE: .5; 3 SOLUTION RESPIRATORY (INHALATION) at 02:20

## 2020-01-01 RX ADMIN — HYDROMORPHONE HYDROCHLORIDE 0.2 MG: 2 INJECTION, SOLUTION INTRAMUSCULAR; INTRAVENOUS; SUBCUTANEOUS at 23:44

## 2020-01-01 RX ADMIN — HYDROMORPHONE HYDROCHLORIDE 0.25 MG: 1 INJECTION, SOLUTION INTRAMUSCULAR; INTRAVENOUS; SUBCUTANEOUS at 03:30

## 2020-01-01 RX ADMIN — LIDOCAINE HYDROCHLORIDE 100 MG: 20 INJECTION, SOLUTION INTRAVENOUS at 15:16

## 2020-01-01 RX ADMIN — POTASSIUM PHOSPHATE, MONOBASIC AND POTASSIUM PHOSPHATE, DIBASIC 30 MMOL: 224; 236 INJECTION, SOLUTION, CONCENTRATE INTRAVENOUS at 23:23

## 2020-01-01 RX ADMIN — SODIUM CHLORIDE: 4.5 INJECTION, SOLUTION INTRAVENOUS at 21:40

## 2020-01-01 RX ADMIN — METOCLOPRAMIDE HYDROCHLORIDE 5 MG: 5 INJECTION INTRAMUSCULAR; INTRAVENOUS at 13:52

## 2020-01-01 RX ADMIN — ROCURONIUM BROMIDE 50 MG: 10 INJECTION, SOLUTION INTRAVENOUS at 15:19

## 2020-01-01 RX ADMIN — HYDROMORPHONE HYDROCHLORIDE 0.5 MG: 1 INJECTION, SOLUTION INTRAMUSCULAR; INTRAVENOUS; SUBCUTANEOUS at 18:13

## 2020-01-01 RX ADMIN — ENOXAPARIN SODIUM 30 MG: 40 INJECTION SUBCUTANEOUS at 15:31

## 2020-01-01 RX ADMIN — BISACODYL 10 MG: 10 SUPPOSITORY RECTAL at 21:14

## 2020-01-01 RX ADMIN — POTASSIUM CHLORIDE 10 MEQ: 10 INJECTION, SOLUTION INTRAVENOUS at 09:50

## 2020-01-01 RX ADMIN — METOCLOPRAMIDE HYDROCHLORIDE 5 MG: 5 INJECTION INTRAMUSCULAR; INTRAVENOUS at 07:00

## 2020-01-01 RX ADMIN — DEXTROSE AND SODIUM CHLORIDE: 5; 450 INJECTION, SOLUTION INTRAVENOUS at 08:34

## 2020-01-01 RX ADMIN — POTASSIUM CHLORIDE 10 MEQ: 10 INJECTION, SOLUTION INTRAVENOUS at 09:44

## 2020-01-01 RX ADMIN — SODIUM CHLORIDE: 9 INJECTION, SOLUTION INTRAVENOUS at 01:53

## 2020-01-01 RX ADMIN — POTASSIUM CHLORIDE 10 MEQ: 10 INJECTION, SOLUTION INTRAVENOUS at 10:43

## 2020-01-01 RX ADMIN — DOCUSATE SODIUM 100 MG: 50 LIQUID ORAL at 08:42

## 2020-01-01 RX ADMIN — ACETAMINOPHEN 650 MG: 650 SUPPOSITORY RECTAL at 12:06

## 2020-01-01 RX ADMIN — METOCLOPRAMIDE HYDROCHLORIDE 5 MG: 5 INJECTION INTRAMUSCULAR; INTRAVENOUS at 14:23

## 2020-01-01 RX ADMIN — DEXTROSE AND SODIUM CHLORIDE: 5; 450 INJECTION, SOLUTION INTRAVENOUS at 11:42

## 2020-01-01 RX ADMIN — CALCIUM GLUCONATE 2 G: 98 INJECTION, SOLUTION INTRAVENOUS at 00:21

## 2020-01-01 RX ADMIN — Medication 10 ML: at 22:08

## 2020-01-01 RX ADMIN — SODIUM CHLORIDE, POTASSIUM CHLORIDE, SODIUM LACTATE AND CALCIUM CHLORIDE 1000 ML: 600; 310; 30; 20 INJECTION, SOLUTION INTRAVENOUS at 14:28

## 2020-01-01 RX ADMIN — ALBUTEROL SULFATE 2.5 MG: 2.5 SOLUTION RESPIRATORY (INHALATION) at 22:51

## 2020-01-01 RX ADMIN — POTASSIUM CHLORIDE 10 MEQ: 10 INJECTION, SOLUTION INTRAVENOUS at 08:40

## 2020-01-01 RX ADMIN — METOCLOPRAMIDE HYDROCHLORIDE 5 MG: 5 INJECTION INTRAMUSCULAR; INTRAVENOUS at 18:55

## 2020-01-01 RX ADMIN — DEXAMETHASONE SODIUM PHOSPHATE 4 MG: 10 INJECTION, SOLUTION INTRAMUSCULAR; INTRAVENOUS at 15:34

## 2020-01-01 RX ADMIN — DEXTROSE AND SODIUM CHLORIDE: 5; 450 INJECTION, SOLUTION INTRAVENOUS at 03:27

## 2020-01-01 RX ADMIN — PROPOFOL 60 MG: 10 INJECTION, EMULSION INTRAVENOUS at 17:15

## 2020-01-01 RX ADMIN — ENOXAPARIN SODIUM 40 MG: 40 INJECTION SUBCUTANEOUS at 08:29

## 2020-01-01 RX ADMIN — ALBUTEROL SULFATE 2.5 MG: 2.5 SOLUTION RESPIRATORY (INHALATION) at 08:30

## 2020-01-01 RX ADMIN — SODIUM CHLORIDE, POTASSIUM CHLORIDE, SODIUM LACTATE AND CALCIUM CHLORIDE 1000 ML: 600; 310; 30; 20 INJECTION, SOLUTION INTRAVENOUS at 13:09

## 2020-01-01 RX ADMIN — IPRATROPIUM BROMIDE AND ALBUTEROL SULFATE 1 AMPULE: .5; 3 SOLUTION RESPIRATORY (INHALATION) at 08:15

## 2020-01-01 RX ADMIN — ALBUTEROL SULFATE 2.5 MG: 2.5 SOLUTION RESPIRATORY (INHALATION) at 20:59

## 2020-01-01 RX ADMIN — ALBUTEROL SULFATE 2.5 MG: 2.5 SOLUTION RESPIRATORY (INHALATION) at 14:40

## 2020-01-01 RX ADMIN — METOCLOPRAMIDE HYDROCHLORIDE 5 MG: 5 INJECTION INTRAMUSCULAR; INTRAVENOUS at 18:26

## 2020-01-01 RX ADMIN — ENOXAPARIN SODIUM 40 MG: 40 INJECTION SUBCUTANEOUS at 21:40

## 2020-01-01 RX ADMIN — METOCLOPRAMIDE HYDROCHLORIDE 5 MG: 5 INJECTION INTRAMUSCULAR; INTRAVENOUS at 01:00

## 2020-01-01 RX ADMIN — ENOXAPARIN SODIUM 40 MG: 40 INJECTION SUBCUTANEOUS at 20:31

## 2020-01-01 RX ADMIN — HYDROMORPHONE HYDROCHLORIDE 0.25 MG: 1 INJECTION, SOLUTION INTRAMUSCULAR; INTRAVENOUS; SUBCUTANEOUS at 22:19

## 2020-01-01 RX ADMIN — METOPROLOL TARTRATE 5 MG: 5 INJECTION INTRAVENOUS at 15:06

## 2020-01-01 RX ADMIN — CEFAZOLIN SODIUM 2 G: 10 INJECTION, POWDER, FOR SOLUTION INTRAVENOUS at 15:24

## 2020-01-01 RX ADMIN — ALBUTEROL SULFATE 2.5 MG: 2.5 SOLUTION RESPIRATORY (INHALATION) at 07:27

## 2020-01-01 RX ADMIN — FENTANYL CITRATE 25 MCG: 50 INJECTION INTRAMUSCULAR; INTRAVENOUS at 18:13

## 2020-01-01 RX ADMIN — ESCITALOPRAM OXALATE 20 MG: 20 TABLET ORAL at 10:00

## 2020-01-01 RX ADMIN — Medication 1 CAPSULE: at 09:59

## 2020-01-01 RX ADMIN — ENOXAPARIN SODIUM 40 MG: 40 INJECTION SUBCUTANEOUS at 21:39

## 2020-01-01 RX ADMIN — MAGNESIUM SULFATE HEPTAHYDRATE 4 G: 40 INJECTION, SOLUTION INTRAVENOUS at 23:24

## 2020-01-01 RX ADMIN — DOCUSATE SODIUM 100 MG: 50 LIQUID ORAL at 19:50

## 2020-01-01 RX ADMIN — SODIUM CHLORIDE: 4.5 INJECTION, SOLUTION INTRAVENOUS at 20:46

## 2020-01-01 RX ADMIN — METOCLOPRAMIDE HYDROCHLORIDE 5 MG: 5 INJECTION INTRAMUSCULAR; INTRAVENOUS at 06:40

## 2020-01-01 RX ADMIN — ENOXAPARIN SODIUM 40 MG: 40 INJECTION SUBCUTANEOUS at 18:33

## 2020-01-01 RX ADMIN — METOCLOPRAMIDE HYDROCHLORIDE 5 MG: 5 INJECTION INTRAMUSCULAR; INTRAVENOUS at 20:31

## 2020-01-01 RX ADMIN — SODIUM CHLORIDE: 4.5 INJECTION, SOLUTION INTRAVENOUS at 19:50

## 2020-01-01 RX ADMIN — METOCLOPRAMIDE HYDROCHLORIDE 5 MG: 5 INJECTION INTRAMUSCULAR; INTRAVENOUS at 13:39

## 2020-01-01 RX ADMIN — ENOXAPARIN SODIUM 40 MG: 40 INJECTION SUBCUTANEOUS at 20:49

## 2020-01-01 RX ADMIN — PROPOFOL 60 MG: 10 INJECTION, EMULSION INTRAVENOUS at 17:04

## 2020-01-01 RX ADMIN — Medication 10 ML: at 10:00

## 2020-01-01 RX ADMIN — METOCLOPRAMIDE HYDROCHLORIDE 5 MG: 5 INJECTION INTRAMUSCULAR; INTRAVENOUS at 09:31

## 2020-01-01 RX ADMIN — CEFTRIAXONE 1 G: 1 INJECTION, POWDER, FOR SOLUTION INTRAMUSCULAR; INTRAVENOUS at 12:49

## 2020-01-01 RX ADMIN — HYDROMORPHONE HYDROCHLORIDE 0.2 MG: 2 INJECTION, SOLUTION INTRAMUSCULAR; INTRAVENOUS; SUBCUTANEOUS at 00:50

## 2020-01-01 RX ADMIN — METOCLOPRAMIDE HYDROCHLORIDE 5 MG: 5 INJECTION INTRAMUSCULAR; INTRAVENOUS at 02:38

## 2020-01-01 RX ADMIN — DOCUSATE SODIUM 100 MG: 50 LIQUID ORAL at 21:09

## 2020-01-01 RX ADMIN — SODIUM CHLORIDE 30 MG/ML INHALATION SOLUTION 15 ML: 30 SOLUTION INHALANT at 07:27

## 2020-01-01 RX ADMIN — Medication 10 ML: at 08:33

## 2020-01-01 RX ADMIN — SODIUM CHLORIDE: 4.5 INJECTION, SOLUTION INTRAVENOUS at 02:09

## 2020-01-01 RX ADMIN — Medication 10 ML: at 10:17

## 2020-01-01 RX ADMIN — ENOXAPARIN SODIUM 40 MG: 40 INJECTION SUBCUTANEOUS at 21:09

## 2020-01-01 RX ADMIN — SODIUM CHLORIDE 30 MG/ML INHALATION SOLUTION 15 ML: 30 SOLUTION INHALANT at 22:51

## 2020-01-01 RX ADMIN — ACETAMINOPHEN 650 MG: 650 SUPPOSITORY RECTAL at 17:47

## 2020-01-01 RX ADMIN — DEXTROSE AND SODIUM CHLORIDE: 5; 450 INJECTION, SOLUTION INTRAVENOUS at 13:02

## 2020-01-01 RX ADMIN — MEMANTINE HYDROCHLORIDE 10 MG: 10 TABLET ORAL at 09:59

## 2020-01-01 RX ADMIN — PROPOFOL 60 MG: 10 INJECTION, EMULSION INTRAVENOUS at 15:19

## 2020-01-01 RX ADMIN — METOCLOPRAMIDE HYDROCHLORIDE 5 MG: 5 INJECTION INTRAMUSCULAR; INTRAVENOUS at 06:00

## 2020-01-01 RX ADMIN — IPRATROPIUM BROMIDE AND ALBUTEROL SULFATE 1 AMPULE: .5; 3 SOLUTION RESPIRATORY (INHALATION) at 08:02

## 2020-01-01 RX ADMIN — MINERAL OIL 330 ML: 1000 SOLUTION ORAL at 18:14

## 2020-01-01 RX ADMIN — IPRATROPIUM BROMIDE AND ALBUTEROL SULFATE 1 AMPULE: .5; 3 SOLUTION RESPIRATORY (INHALATION) at 15:53

## 2020-01-01 RX ADMIN — SODIUM CHLORIDE 30 MG/ML INHALATION SOLUTION 15 ML: 30 SOLUTION INHALANT at 21:02

## 2020-01-01 RX ADMIN — HYDROMORPHONE HYDROCHLORIDE 0.25 MG: 1 INJECTION, SOLUTION INTRAMUSCULAR; INTRAVENOUS; SUBCUTANEOUS at 05:38

## 2020-01-01 RX ADMIN — SODIUM CHLORIDE 30 MG/ML INHALATION SOLUTION 15 ML: 30 SOLUTION INHALANT at 08:30

## 2020-01-01 RX ADMIN — IPRATROPIUM BROMIDE AND ALBUTEROL SULFATE 1 AMPULE: .5; 3 SOLUTION RESPIRATORY (INHALATION) at 21:24

## 2020-01-01 RX ADMIN — DOCUSATE SODIUM 100 MG: 50 LIQUID ORAL at 09:11

## 2020-01-01 RX ADMIN — DOCUSATE SODIUM 100 MG: 50 LIQUID ORAL at 20:48

## 2020-01-01 RX ADMIN — METOCLOPRAMIDE HYDROCHLORIDE 5 MG: 5 INJECTION INTRAMUSCULAR; INTRAVENOUS at 18:12

## 2020-01-01 RX ADMIN — PHENYLEPHRINE HYDROCHLORIDE 100 MCG: 10 INJECTION, SOLUTION INTRAMUSCULAR; INTRAVENOUS; SUBCUTANEOUS at 15:48

## 2020-01-01 RX ADMIN — METOCLOPRAMIDE HYDROCHLORIDE 5 MG: 5 INJECTION INTRAMUSCULAR; INTRAVENOUS at 14:42

## 2020-01-01 RX ADMIN — PROPOFOL 100 MG: 10 INJECTION, EMULSION INTRAVENOUS at 23:17

## 2020-01-01 RX ADMIN — DEXTROSE MONOHYDRATE, SODIUM CHLORIDE, AND POTASSIUM CHLORIDE: 50; 4.5; 1.49 INJECTION, SOLUTION INTRAVENOUS at 06:30

## 2020-01-01 RX ADMIN — SUGAMMADEX 200 MG: 100 INJECTION, SOLUTION INTRAVENOUS at 17:08

## 2020-01-01 RX ADMIN — SODIUM CHLORIDE, SODIUM LACTATE, POTASSIUM CHLORIDE, AND CALCIUM CHLORIDE: 600; 310; 30; 20 INJECTION, SOLUTION INTRAVENOUS at 16:55

## 2020-01-01 RX ADMIN — HYDROMORPHONE HYDROCHLORIDE 0.5 MG: 1 INJECTION, SOLUTION INTRAMUSCULAR; INTRAVENOUS; SUBCUTANEOUS at 10:51

## 2020-01-01 RX ADMIN — SODIUM CHLORIDE, SODIUM LACTATE, POTASSIUM CHLORIDE, AND CALCIUM CHLORIDE: 600; 310; 30; 20 INJECTION, SOLUTION INTRAVENOUS at 15:09

## 2020-01-01 RX ADMIN — IPRATROPIUM BROMIDE AND ALBUTEROL SULFATE 1 AMPULE: .5; 3 SOLUTION RESPIRATORY (INHALATION) at 11:34

## 2020-01-01 RX ADMIN — SODIUM CHLORIDE, POTASSIUM CHLORIDE, SODIUM LACTATE AND CALCIUM CHLORIDE 1000 ML: 600; 310; 30; 20 INJECTION, SOLUTION INTRAVENOUS at 15:21

## 2020-01-01 RX ADMIN — METOCLOPRAMIDE HYDROCHLORIDE 5 MG: 5 INJECTION INTRAMUSCULAR; INTRAVENOUS at 06:31

## 2020-01-01 RX ADMIN — CALCIUM GLUCONATE 1 G: 98 INJECTION, SOLUTION INTRAVENOUS at 06:31

## 2020-01-01 RX ADMIN — PERFLUTREN 2.2 MG: 6.52 INJECTION, SUSPENSION INTRAVENOUS at 10:15

## 2020-01-01 RX ADMIN — METOCLOPRAMIDE HYDROCHLORIDE 5 MG: 5 INJECTION INTRAMUSCULAR; INTRAVENOUS at 01:35

## 2020-01-01 RX ADMIN — MEMANTINE HYDROCHLORIDE 10 MG: 10 TABLET ORAL at 20:48

## 2020-01-01 RX ADMIN — SODIUM CHLORIDE: 9 INJECTION, SOLUTION INTRAVENOUS at 23:15

## 2020-01-01 RX ADMIN — PROPOFOL 50 MG: 10 INJECTION, EMULSION INTRAVENOUS at 23:18

## 2020-01-01 RX ADMIN — POTASSIUM CHLORIDE 10 MEQ: 7.46 INJECTION, SOLUTION INTRAVENOUS at 04:15

## 2020-01-01 RX ADMIN — MAGNESIUM SULFATE HEPTAHYDRATE 2 G: 40 INJECTION, SOLUTION INTRAVENOUS at 07:28

## 2020-01-01 RX ADMIN — DEXTROSE AND SODIUM CHLORIDE: 5; 450 INJECTION, SOLUTION INTRAVENOUS at 18:37

## 2020-01-01 RX ADMIN — CEPHALEXIN 500 MG: 500 CAPSULE ORAL at 23:52

## 2020-01-01 RX ADMIN — HYDROMORPHONE HYDROCHLORIDE 0.2 MG: 2 INJECTION, SOLUTION INTRAMUSCULAR; INTRAVENOUS; SUBCUTANEOUS at 00:05

## 2020-01-01 RX ADMIN — HYDROMORPHONE HYDROCHLORIDE 0.2 MG: 2 INJECTION, SOLUTION INTRAMUSCULAR; INTRAVENOUS; SUBCUTANEOUS at 01:16

## 2020-01-01 RX ADMIN — HYDROMORPHONE HYDROCHLORIDE 0.2 MG: 2 INJECTION, SOLUTION INTRAMUSCULAR; INTRAVENOUS; SUBCUTANEOUS at 23:39

## 2020-01-01 RX ADMIN — ENOXAPARIN SODIUM 40 MG: 40 INJECTION SUBCUTANEOUS at 20:30

## 2020-01-01 RX ADMIN — ALBUTEROL SULFATE 2.5 MG: 2.5 SOLUTION RESPIRATORY (INHALATION) at 15:44

## 2020-01-01 RX ADMIN — QUETIAPINE FUMARATE 25 MG: 25 TABLET ORAL at 20:48

## 2020-01-01 RX ADMIN — DOCUSATE SODIUM 100 MG: 50 LIQUID ORAL at 09:31

## 2020-01-01 RX ADMIN — MAGNESIUM SULFATE HEPTAHYDRATE 2 G: 40 INJECTION, SOLUTION INTRAVENOUS at 03:13

## 2020-01-01 RX ADMIN — SODIUM CHLORIDE: 4.5 INJECTION, SOLUTION INTRAVENOUS at 11:41

## 2020-01-01 RX ADMIN — HYDROMORPHONE HYDROCHLORIDE 0.25 MG: 1 INJECTION, SOLUTION INTRAMUSCULAR; INTRAVENOUS; SUBCUTANEOUS at 13:02

## 2020-01-01 RX ADMIN — POTASSIUM CHLORIDE 10 MEQ: 10 INJECTION, SOLUTION INTRAVENOUS at 10:36

## 2020-01-01 RX ADMIN — PHENYLEPHRINE HYDROCHLORIDE 100 MCG: 10 INJECTION, SOLUTION INTRAMUSCULAR; INTRAVENOUS; SUBCUTANEOUS at 16:47

## 2020-01-01 RX ADMIN — ROCURONIUM BROMIDE 30 MG: 10 INJECTION, SOLUTION INTRAVENOUS at 16:50

## 2020-01-01 ASSESSMENT — PULMONARY FUNCTION TESTS
PIF_VALUE: 14
PIF_VALUE: 18
PIF_VALUE: 14
PIF_VALUE: 27
PIF_VALUE: 8
PIF_VALUE: 17
PIF_VALUE: 1
PIF_VALUE: 3
PIF_VALUE: 27
PIF_VALUE: 23
PIF_VALUE: 1
PIF_VALUE: 15
PIF_VALUE: 15
PIF_VALUE: 2
PIF_VALUE: 18
PIF_VALUE: 9
PIF_VALUE: 21
PIF_VALUE: 23
PIF_VALUE: 18
PIF_VALUE: 42
PIF_VALUE: 1
PIF_VALUE: 29
PIF_VALUE: 26
PIF_VALUE: 20
PIF_VALUE: 15
PIF_VALUE: 14
PIF_VALUE: 17
PIF_VALUE: 1
PIF_VALUE: 21
PIF_VALUE: 1
PIF_VALUE: 1
PIF_VALUE: 14
PIF_VALUE: 29
PIF_VALUE: 17
PIF_VALUE: 21
PIF_VALUE: 15
PIF_VALUE: 14
PIF_VALUE: 15
PIF_VALUE: 19
PIF_VALUE: 21
PIF_VALUE: 15
PIF_VALUE: 3
PIF_VALUE: 17
PIF_VALUE: 14
PIF_VALUE: 16
PIF_VALUE: 3
PIF_VALUE: 21
PIF_VALUE: 19
PIF_VALUE: 17
PIF_VALUE: 17
PIF_VALUE: 1
PIF_VALUE: 14
PIF_VALUE: 3
PIF_VALUE: 21
PIF_VALUE: 2
PIF_VALUE: 21
PIF_VALUE: 14
PIF_VALUE: 27
PIF_VALUE: 21
PIF_VALUE: 33
PIF_VALUE: 1
PIF_VALUE: 14
PIF_VALUE: 21
PIF_VALUE: 18
PIF_VALUE: 21
PIF_VALUE: 14
PIF_VALUE: 2
PIF_VALUE: 21
PIF_VALUE: 15
PIF_VALUE: 15
PIF_VALUE: 23
PIF_VALUE: 14
PIF_VALUE: 29
PIF_VALUE: 1
PIF_VALUE: 21
PIF_VALUE: 16
PIF_VALUE: 18
PIF_VALUE: 21
PIF_VALUE: 21
PIF_VALUE: 14
PIF_VALUE: 8
PIF_VALUE: 14
PIF_VALUE: 26
PIF_VALUE: 18
PIF_VALUE: 17
PIF_VALUE: 19
PIF_VALUE: 15
PIF_VALUE: 15
PIF_VALUE: 14
PIF_VALUE: 12
PIF_VALUE: 15
PIF_VALUE: 21
PIF_VALUE: 29
PIF_VALUE: 25
PIF_VALUE: 4
PIF_VALUE: 14
PIF_VALUE: 15
PIF_VALUE: 21
PIF_VALUE: 9
PIF_VALUE: 15
PIF_VALUE: 26
PIF_VALUE: 15
PIF_VALUE: 15
PIF_VALUE: 14
PIF_VALUE: 28
PIF_VALUE: 14
PIF_VALUE: 14
PIF_VALUE: 8
PIF_VALUE: 4
PIF_VALUE: 21
PIF_VALUE: 14
PIF_VALUE: 21
PIF_VALUE: 22
PIF_VALUE: 15
PIF_VALUE: 26
PIF_VALUE: 14
PIF_VALUE: 14
PIF_VALUE: 24
PIF_VALUE: 15
PIF_VALUE: 24
PIF_VALUE: 17
PIF_VALUE: 21
PIF_VALUE: 21
PIF_VALUE: 1
PIF_VALUE: 14
PIF_VALUE: 21
PIF_VALUE: 21
PIF_VALUE: 17
PIF_VALUE: 21
PIF_VALUE: 18
PIF_VALUE: 21
PIF_VALUE: 1
PIF_VALUE: 23
PIF_VALUE: 21
PIF_VALUE: 15
PIF_VALUE: 15
PIF_VALUE: 16
PIF_VALUE: 15
PIF_VALUE: 21
PIF_VALUE: 21
PIF_VALUE: 15
PIF_VALUE: 15
PIF_VALUE: 12
PIF_VALUE: 19
PIF_VALUE: 21
PIF_VALUE: 21
PIF_VALUE: 2
PIF_VALUE: 15
PIF_VALUE: 16
PIF_VALUE: 23
PIF_VALUE: 22
PIF_VALUE: 21
PIF_VALUE: 21
PIF_VALUE: 14
PIF_VALUE: 14
PIF_VALUE: 21
PIF_VALUE: 15
PIF_VALUE: 3
PIF_VALUE: 20
PIF_VALUE: 22
PIF_VALUE: 14
PIF_VALUE: 2
PIF_VALUE: 14
PIF_VALUE: 24
PIF_VALUE: 15
PIF_VALUE: 14
PIF_VALUE: 4
PIF_VALUE: 16
PIF_VALUE: 21
PIF_VALUE: 25
PIF_VALUE: 16
PIF_VALUE: 15
PIF_VALUE: 24
PIF_VALUE: 21
PIF_VALUE: 19
PIF_VALUE: 3
PIF_VALUE: 3
PIF_VALUE: 18
PIF_VALUE: 22
PIF_VALUE: 24
PIF_VALUE: 2
PIF_VALUE: 21
PIF_VALUE: 15
PIF_VALUE: 15
PIF_VALUE: 21
PIF_VALUE: 21
PIF_VALUE: 11
PIF_VALUE: 14
PIF_VALUE: 17
PIF_VALUE: 21
PIF_VALUE: 15
PIF_VALUE: 15
PIF_VALUE: 21
PIF_VALUE: 15
PIF_VALUE: 4
PIF_VALUE: 21
PIF_VALUE: 19
PIF_VALUE: 15
PIF_VALUE: 14
PIF_VALUE: 15
PIF_VALUE: 8
PIF_VALUE: 29
PIF_VALUE: 21
PIF_VALUE: 15
PIF_VALUE: 21
PIF_VALUE: 15
PIF_VALUE: 20
PIF_VALUE: 14
PIF_VALUE: 21
PIF_VALUE: 21
PIF_VALUE: 19
PIF_VALUE: 4
PIF_VALUE: 3
PIF_VALUE: 15
PIF_VALUE: 21
PIF_VALUE: 8
PIF_VALUE: 16
PIF_VALUE: 16
PIF_VALUE: 15
PIF_VALUE: 24
PIF_VALUE: 21
PIF_VALUE: 8
PIF_VALUE: 27
PIF_VALUE: 21
PIF_VALUE: 16
PIF_VALUE: 1
PIF_VALUE: 18
PIF_VALUE: 22
PIF_VALUE: 1
PIF_VALUE: 14
PIF_VALUE: 21
PIF_VALUE: 1
PIF_VALUE: 15
PIF_VALUE: 21
PIF_VALUE: 15
PIF_VALUE: 22
PIF_VALUE: 17
PIF_VALUE: 15
PIF_VALUE: 9
PIF_VALUE: 15
PIF_VALUE: 9
PIF_VALUE: 24
PIF_VALUE: 15
PIF_VALUE: 14
PIF_VALUE: 16
PIF_VALUE: 15
PIF_VALUE: 21
PIF_VALUE: 21
PIF_VALUE: 15
PIF_VALUE: 26
PIF_VALUE: 21
PIF_VALUE: 2
PIF_VALUE: 21
PIF_VALUE: 14

## 2020-01-01 ASSESSMENT — PAIN SCALES - PAIN ASSESSMENT IN ADVANCED DEMENTIA (PAINAD)
BREATHING: 0
BREATHING: 0
FACIALEXPRESSION: 0
BODYLANGUAGE: 0
CONSOLABILITY: 0
TOTALSCORE: 0
TOTALSCORE: 3
BODYLANGUAGE: 0
BREATHING: 0
TOTALSCORE: 0
CONSOLABILITY: 0
FACIALEXPRESSION: 0
CONSOLABILITY: 0
FACIALEXPRESSION: 0
CONSOLABILITY: 0
FACIALEXPRESSION: 0
BREATHING: 0
CONSOLABILITY: 0
FACIALEXPRESSION: 0
NEGVOCALIZATION: 0
BREATHING: 0
BODYLANGUAGE: 0
TOTALSCORE: 0
NEGVOCALIZATION: 0
TOTALSCORE: 0
BODYLANGUAGE: 0
CONSOLABILITY: 0
NEGVOCALIZATION: 0
CONSOLABILITY: 0
FACIALEXPRESSION: 0
CONSOLABILITY: 0
CONSOLABILITY: 0
BREATHING: 0
BREATHING: 0
FACIALEXPRESSION: 0
TOTALSCORE: 0
CONSOLABILITY: 0
BODYLANGUAGE: 1
BODYLANGUAGE: 0
BODYLANGUAGE: 0
NEGVOCALIZATION: 0
BREATHING: 0
NEGVOCALIZATION: 0
BREATHING: 0
NEGVOCALIZATION: 0
BODYLANGUAGE: 0
BREATHING: 0
NEGVOCALIZATION: 0
NEGVOCALIZATION: 0
FACIALEXPRESSION: 0
BODYLANGUAGE: 0
NEGVOCALIZATION: 0
BODYLANGUAGE: 0
TOTALSCORE: 0
FACIALEXPRESSION: 0
FACIALEXPRESSION: 1
BREATHING: 0
BREATHING: 0
FACIALEXPRESSION: 0
BODYLANGUAGE: 0
TOTALSCORE: 0
TOTALSCORE: 5
CONSOLABILITY: 0
BODYLANGUAGE: 0
TOTALSCORE: 0
CONSOLABILITY: 0
NEGVOCALIZATION: 0
CONSOLABILITY: 1
FACIALEXPRESSION: 2
NEGVOCALIZATION: 1
TOTALSCORE: 0
CONSOLABILITY: 0
NEGVOCALIZATION: 0
CONSOLABILITY: 0
FACIALEXPRESSION: 0
NEGVOCALIZATION: 0
BREATHING: 0
BODYLANGUAGE: 0
CONSOLABILITY: 0
CONSOLABILITY: 0
TOTALSCORE: 1
CONSOLABILITY: 0
TOTALSCORE: 0
CONSOLABILITY: 0
BODYLANGUAGE: 2
CONSOLABILITY: 0
BREATHING: 0
FACIALEXPRESSION: 0
BREATHING: 0
CONSOLABILITY: 0
NEGVOCALIZATION: 0
TOTALSCORE: 0
BODYLANGUAGE: 0
BREATHING: 0
NEGVOCALIZATION: 0
BODYLANGUAGE: 0
FACIALEXPRESSION: 0
NEGVOCALIZATION: 0
FACIALEXPRESSION: 0
BODYLANGUAGE: 0
NEGVOCALIZATION: 0
BODYLANGUAGE: 0
TOTALSCORE: 0
BREATHING: 0
CONSOLABILITY: 0
BREATHING: 0
CONSOLABILITY: 0
NEGVOCALIZATION: 0
FACIALEXPRESSION: 0
BREATHING: 0
NEGVOCALIZATION: 0
TOTALSCORE: 0
FACIALEXPRESSION: 0
TOTALSCORE: 0
TOTALSCORE: 0
CONSOLABILITY: 0
NEGVOCALIZATION: 0
BODYLANGUAGE: 0
BODYLANGUAGE: 0
BREATHING: 0
BODYLANGUAGE: 0
CONSOLABILITY: 0
NEGVOCALIZATION: 0
BODYLANGUAGE: 2
BODYLANGUAGE: 0
BODYLANGUAGE: 0
NEGVOCALIZATION: 0
FACIALEXPRESSION: 0
BODYLANGUAGE: 0
TOTALSCORE: 0
BODYLANGUAGE: 0
BREATHING: 0
FACIALEXPRESSION: 0
TOTALSCORE: 0
CONSOLABILITY: 0
FACIALEXPRESSION: 0
TOTALSCORE: 0
BREATHING: 0
CONSOLABILITY: 0
NEGVOCALIZATION: 0
NEGVOCALIZATION: 0
TOTALSCORE: 1
TOTALSCORE: 0
CONSOLABILITY: 0
FACIALEXPRESSION: 0
CONSOLABILITY: 0
CONSOLABILITY: 0
BREATHING: 0
TOTALSCORE: 0
FACIALEXPRESSION: 1
BODYLANGUAGE: 0
FACIALEXPRESSION: 0
NEGVOCALIZATION: 1
FACIALEXPRESSION: 0
NEGVOCALIZATION: 0
NEGVOCALIZATION: 0
FACIALEXPRESSION: 0
NEGVOCALIZATION: 0
TOTALSCORE: 0
CONSOLABILITY: 0
NEGVOCALIZATION: 0
CONSOLABILITY: 1
BODYLANGUAGE: 0
BREATHING: 0
BODYLANGUAGE: 0
CONSOLABILITY: 0
BREATHING: 0
FACIALEXPRESSION: 0
FACIALEXPRESSION: 0
BREATHING: 0
BODYLANGUAGE: 0
TOTALSCORE: 0
TOTALSCORE: 0
BREATHING: 0
TOTALSCORE: 0
NEGVOCALIZATION: 0
FACIALEXPRESSION: 0
BODYLANGUAGE: 2
BODYLANGUAGE: 0
CONSOLABILITY: 0
FACIALEXPRESSION: 0
FACIALEXPRESSION: 0
BODYLANGUAGE: 0
TOTALSCORE: 0
BREATHING: 0
NEGVOCALIZATION: 0
FACIALEXPRESSION: 0
CONSOLABILITY: 0
TOTALSCORE: 0
BODYLANGUAGE: 0
NEGVOCALIZATION: 0
BODYLANGUAGE: 0
BREATHING: 0
BODYLANGUAGE: 0
BREATHING: 0
NEGVOCALIZATION: 0
CONSOLABILITY: 0
BREATHING: 0
FACIALEXPRESSION: 0
TOTALSCORE: 0
NEGVOCALIZATION: 0
BREATHING: 0
BODYLANGUAGE: 0
BREATHING: 0
CONSOLABILITY: 0
BODYLANGUAGE: 1
BODYLANGUAGE: 2
BREATHING: 0
FACIALEXPRESSION: 0
FACIALEXPRESSION: 0
CONSOLABILITY: 0
TOTALSCORE: 0
CONSOLABILITY: 0
FACIALEXPRESSION: 0
NEGVOCALIZATION: 0
BODYLANGUAGE: 1
CONSOLABILITY: 0
CONSOLABILITY: 1
TOTALSCORE: 0
BREATHING: 0
BREATHING: 0
CONSOLABILITY: 0
FACIALEXPRESSION: 0
BODYLANGUAGE: 0
CONSOLABILITY: 0
BREATHING: 0
NEGVOCALIZATION: 0
BODYLANGUAGE: 0
TOTALSCORE: 0
FACIALEXPRESSION: 0
TOTALSCORE: 0
BREATHING: 0
BODYLANGUAGE: 0
FACIALEXPRESSION: 0
BODYLANGUAGE: 0
CONSOLABILITY: 0
BODYLANGUAGE: 0
CONSOLABILITY: 0
NEGVOCALIZATION: 0
CONSOLABILITY: 0
NEGVOCALIZATION: 0
TOTALSCORE: 0
CONSOLABILITY: 0
CONSOLABILITY: 2
TOTALSCORE: 0
BREATHING: 0
TOTALSCORE: 0
FACIALEXPRESSION: 0
TOTALSCORE: 0
TOTALSCORE: 0
BREATHING: 0
NEGVOCALIZATION: 0
FACIALEXPRESSION: 0
BREATHING: 0
TOTALSCORE: 0
BREATHING: 0
FACIALEXPRESSION: 0
BREATHING: 0
FACIALEXPRESSION: 0
NEGVOCALIZATION: 0
TOTALSCORE: 5
NEGVOCALIZATION: 0
BODYLANGUAGE: 0
TOTALSCORE: 0
BREATHING: 0
TOTALSCORE: 4
TOTALSCORE: 0
FACIALEXPRESSION: 0
FACIALEXPRESSION: 0
NEGVOCALIZATION: 0
FACIALEXPRESSION: 0
NEGVOCALIZATION: 0
NEGVOCALIZATION: 0
FACIALEXPRESSION: 1
FACIALEXPRESSION: 0
NEGVOCALIZATION: 0
FACIALEXPRESSION: 0
BODYLANGUAGE: 0
BREATHING: 0
TOTALSCORE: 1
CONSOLABILITY: 0
BREATHING: 0
NEGVOCALIZATION: 0
BREATHING: 0
BODYLANGUAGE: 0
FACIALEXPRESSION: 0
TOTALSCORE: 1
NEGVOCALIZATION: 0
BREATHING: 0
BODYLANGUAGE: 0
CONSOLABILITY: 0
TOTALSCORE: 0
BREATHING: 0
CONSOLABILITY: 0
BREATHING: 0
TOTALSCORE: 0
FACIALEXPRESSION: 0
NEGVOCALIZATION: 0
FACIALEXPRESSION: 0
NEGVOCALIZATION: 0
FACIALEXPRESSION: 0
NEGVOCALIZATION: 0
NEGVOCALIZATION: 0
BREATHING: 0
CONSOLABILITY: 0
FACIALEXPRESSION: 0
FACIALEXPRESSION: 0
TOTALSCORE: 0
BREATHING: 0
FACIALEXPRESSION: 0
CONSOLABILITY: 0
BREATHING: 0
NEGVOCALIZATION: 0
BODYLANGUAGE: 0
TOTALSCORE: 0
BODYLANGUAGE: 0
NEGVOCALIZATION: 0
BREATHING: 0
TOTALSCORE: 2
NEGVOCALIZATION: 0
TOTALSCORE: 0
TOTALSCORE: 1
CONSOLABILITY: 0
BODYLANGUAGE: 0
NEGVOCALIZATION: 0
TOTALSCORE: 0
CONSOLABILITY: 0
TOTALSCORE: 0
CONSOLABILITY: 0
FACIALEXPRESSION: 0
TOTALSCORE: 0
CONSOLABILITY: 0
BODYLANGUAGE: 1
NEGVOCALIZATION: 0
BODYLANGUAGE: 0
NEGVOCALIZATION: 0
FACIALEXPRESSION: 0
TOTALSCORE: 0
BREATHING: 0
FACIALEXPRESSION: 0
BODYLANGUAGE: 0
NEGVOCALIZATION: 0
FACIALEXPRESSION: 0
BREATHING: 0
CONSOLABILITY: 0
CONSOLABILITY: 0
BREATHING: 0
FACIALEXPRESSION: 0
FACIALEXPRESSION: 0
TOTALSCORE: 0
BREATHING: 0
FACIALEXPRESSION: 0
BREATHING: 0
BREATHING: 0
NEGVOCALIZATION: 0
TOTALSCORE: 0
CONSOLABILITY: 0
BODYLANGUAGE: 0
FACIALEXPRESSION: 0
FACIALEXPRESSION: 0
BREATHING: 0
TOTALSCORE: 0
NEGVOCALIZATION: 0
BREATHING: 0
BREATHING: 0
BODYLANGUAGE: 0
TOTALSCORE: 0
CONSOLABILITY: 2
CONSOLABILITY: 0
CONSOLABILITY: 0
BODYLANGUAGE: 0
BREATHING: 0
BREATHING: 0
NEGVOCALIZATION: 0
BODYLANGUAGE: 0
NEGVOCALIZATION: 0
NEGVOCALIZATION: 0
CONSOLABILITY: 0
FACIALEXPRESSION: 0
NEGVOCALIZATION: 0
TOTALSCORE: 0
BODYLANGUAGE: 0
FACIALEXPRESSION: 0
BREATHING: 0
NEGVOCALIZATION: 0
NEGVOCALIZATION: 0
BODYLANGUAGE: 0
CONSOLABILITY: 0
TOTALSCORE: 0
BREATHING: 0
NEGVOCALIZATION: 1
BREATHING: 0
BODYLANGUAGE: 0
FACIALEXPRESSION: 0
CONSOLABILITY: 0
FACIALEXPRESSION: 0
CONSOLABILITY: 0
TOTALSCORE: 0
TOTALSCORE: 0
FACIALEXPRESSION: 0
TOTALSCORE: 4
NEGVOCALIZATION: 0
BREATHING: 0
FACIALEXPRESSION: 0
TOTALSCORE: 0
TOTALSCORE: 0
CONSOLABILITY: 0
CONSOLABILITY: 0
BODYLANGUAGE: 0
TOTALSCORE: 0
TOTALSCORE: 0
BODYLANGUAGE: 0
BREATHING: 0
BREATHING: 0
CONSOLABILITY: 0
CONSOLABILITY: 0
BREATHING: 0
BREATHING: 0
NEGVOCALIZATION: 0
CONSOLABILITY: 1
NEGVOCALIZATION: 0
FACIALEXPRESSION: 0
FACIALEXPRESSION: 0
CONSOLABILITY: 0

## 2020-01-01 ASSESSMENT — PAIN SCALES - WONG BAKER
WONGBAKER_NUMERICALRESPONSE: 0
WONGBAKER_NUMERICALRESPONSE: 2
WONGBAKER_NUMERICALRESPONSE: 2
WONGBAKER_NUMERICALRESPONSE: 0
WONGBAKER_NUMERICALRESPONSE: 2
WONGBAKER_NUMERICALRESPONSE: 0
WONGBAKER_NUMERICALRESPONSE: 0
WONGBAKER_NUMERICALRESPONSE: 2
WONGBAKER_NUMERICALRESPONSE: 0
WONGBAKER_NUMERICALRESPONSE: 0
WONGBAKER_NUMERICALRESPONSE: 2
WONGBAKER_NUMERICALRESPONSE: 0
WONGBAKER_NUMERICALRESPONSE: 2
WONGBAKER_NUMERICALRESPONSE: 0
WONGBAKER_NUMERICALRESPONSE: 0
WONGBAKER_NUMERICALRESPONSE: 4
WONGBAKER_NUMERICALRESPONSE: 0
WONGBAKER_NUMERICALRESPONSE: 2

## 2020-01-01 ASSESSMENT — PAIN SCALES - GENERAL
PAINLEVEL_OUTOF10: 0
PAINLEVEL_OUTOF10: 0
PAINLEVEL_OUTOF10: 7
PAINLEVEL_OUTOF10: 4
PAINLEVEL_OUTOF10: 5
PAINLEVEL_OUTOF10: 0
PAINLEVEL_OUTOF10: 0
PAINLEVEL_OUTOF10: 3
PAINLEVEL_OUTOF10: 0
PAINLEVEL_OUTOF10: 5
PAINLEVEL_OUTOF10: 5
PAINLEVEL_OUTOF10: 6
PAINLEVEL_OUTOF10: 0
PAINLEVEL_OUTOF10: 5
PAINLEVEL_OUTOF10: 7
PAINLEVEL_OUTOF10: 0

## 2020-01-01 ASSESSMENT — ENCOUNTER SYMPTOMS
DIARRHEA: 0
SHORTNESS OF BREATH: 0
EYE DISCHARGE: 0
NAUSEA: 0
RHINORRHEA: 0
EYE PAIN: 0

## 2020-01-01 ASSESSMENT — PAIN DESCRIPTION - DESCRIPTORS: DESCRIPTORS: PATIENT UNABLE TO DESCRIBE

## 2020-01-01 ASSESSMENT — PATIENT HEALTH QUESTIONNAIRE - PHQ9
SUM OF ALL RESPONSES TO PHQ9 QUESTIONS 1 & 2: 2
2. FEELING DOWN, DEPRESSED OR HOPELESS: 0
SUM OF ALL RESPONSES TO PHQ QUESTIONS 1-9: 2
SUM OF ALL RESPONSES TO PHQ QUESTIONS 1-9: 2
1. LITTLE INTEREST OR PLEASURE IN DOING THINGS: 2

## 2020-01-01 ASSESSMENT — PAIN DESCRIPTION - ONSET: ONSET: UNABLE TO ASSESS

## 2020-01-01 ASSESSMENT — PAIN DESCRIPTION - PAIN TYPE: TYPE: OTHER (COMMENT)

## 2020-01-13 PROBLEM — F01.50 ISCHEMIC VASCULAR DEMENTIA (HCC): Status: ACTIVE | Noted: 2020-01-01

## 2020-01-13 NOTE — PROGRESS NOTES
2020     Augustina Chappell (:  1945) is a 75 y.o. male, here for evaluation of the following medical concerns:    Hypertension   This is a chronic problem. Pertinent negatives include no chest pain, palpitations or shortness of breath. Mental Health Problem   The primary symptoms include dysphoric mood. Primary symptoms comment: memory impairments which . This is a chronic problem. The degree of incapacity that he is experiencing as a consequence of his illness is severe. Additional symptoms of the illness include anhedonia, hypersomnia (frequet day sravan naps, totalling 20 hours), appetite change (decreased), unexpected weight change (losing weigt), fatigue, agitation and psychomotor retardation. Additional symptoms of the illness do not include euphoric mood or increased goal-directed activity. Review of Systems   Constitutional: Positive for appetite change (decreased), fatigue and unexpected weight change (losing weigt). HENT: Negative for congestion and rhinorrhea. Eyes: Negative for pain and discharge. Respiratory: Negative for shortness of breath. Cardiovascular: Positive for leg swelling (feet). Negative for chest pain and palpitations. Gastrointestinal: Negative for diarrhea and nausea. Genitourinary: Negative for dysuria. Neurological: Positive for weakness. Psychiatric/Behavioral: Positive for agitation, behavioral problems, confusion, dysphoric mood and sleep disturbance. Prior to Visit Medications    Medication Sig Taking?  Authorizing Provider   Probiotic Product (PROBIOTIC-10) CAPS Take 1 capsule by mouth daily Yes Historical Provider, MD   vitamin B-12 (CYANOCOBALAMIN) 100 MCG tablet Take 100 mcg by mouth daily Yes Historical Provider, MD   clopidogrel (PLAVIX) 75 MG tablet TAKE 1 TABLET BY MOUTH DAILY Yes ROZ Ignacio MD   escitalopram (LEXAPRO) 20 MG tablet Take 1 tablet by mouth daily Yes Jean Silvestre MD   losartan (COZAAR) 100 MG tablet TAKE 1 TABLET BY MOUTH DAILY Yes ROZ Meza MD   Donepezil HCl (ARICEPT PO) Take by mouth Yes Historical Provider, MD   QUEtiapine (SEROQUEL) 25 MG tablet Take 1 tablet by mouth nightly Yes Lee Vernon MD   ezetimibe-simvastatin (VYTORIN) 10-20 MG per tablet TAKE 1 TABLET BY MOUTH EVERY NIGHT Yes ROZ Meza MD   memantine (NAMENDA) 10 MG tablet Take 10 mg by mouth 2 times daily Yes Historical Provider, MD   Multiple Vitamins-Minerals (CENTRUM SILVER ULTRA MENS) TABS Take 1 tablet by mouth daily. Yes Historical Provider, MD   omega-3 acid ethyl esters (LOVAZA) 1 GM capsule Take 1 g by mouth daily. Yes Historical Provider, MD        Social History     Tobacco Use    Smoking status: Never Smoker    Smokeless tobacco: Never Used   Substance Use Topics    Alcohol use: No        Vitals:    01/13/20 0906   BP: 116/80   Site: Left Upper Arm   Position: Standing   Cuff Size: Small Adult   Pulse: 60   Temp: 97.9 °F (36.6 °C)   TempSrc: Oral   Weight: 204 lb 9.6 oz (92.8 kg)   Height: 6' 2\" (1.88 m)     Estimated body mass index is 26.27 kg/m² as calculated from the following:    Height as of this encounter: 6' 2\" (1.88 m). Weight as of this encounter: 204 lb 9.6 oz (92.8 kg). Physical Exam  Constitutional:       General: He is not in acute distress. Appearance: Normal appearance. He is well-developed. Eyes:      Pupils: Pupils are equal, round, and reactive to light. Neck:      Musculoskeletal: Normal range of motion and neck supple. Cardiovascular:      Rate and Rhythm: Normal rate and regular rhythm. Heart sounds: Normal heart sounds. No murmur. No friction rub. No gallop. Pulmonary:      Effort: Pulmonary effort is normal. No respiratory distress. Breath sounds: Normal breath sounds. Abdominal:      General: Bowel sounds are normal.      Palpations: Abdomen is soft. Musculoskeletal: Normal range of motion. General: No tenderness.    Neurological:      Mental Status: He is alert and oriented to person, place, and time. ASSESSMENT/PLAN:  1. Ischemic vascular dementia ([facility] [address]  Progressively worsening   Increased agitation, confusion, needs helps with all activities of daily living   Can start twice a day Risperdal for agitation   Family may require more help around the house because of increasing needs   Can consider discontinuation of memantine and donepizil as there isn't significant improvement of symptoms     2. Essential hypertension  Well controlled with losartan     3. Hyperlipidemia   - continue ezetimibe and simvastatin       Return in about 3 months (around 4/13/2020). An electronic signature was used to authenticate this note.     --Lakisha Mitchell MD on 1/13/2020 at 9:25 AM

## 2020-01-23 NOTE — PROGRESS NOTES
MD Ifeoma   clopidogrel (PLAVIX) 75 MG tablet TAKE 1 TABLET BY MOUTH DAILY Yes ROZ Ignacio MD   escitalopram (LEXAPRO) 20 MG tablet Take 1 tablet by mouth daily Yes Jean Silvestre MD   losartan (COZAAR) 100 MG tablet TAKE 1 TABLET BY MOUTH DAILY Yes Jean Silvestre MD   Donepezil HCl (ARICEPT PO) Take by mouth Yes Historical Provider, MD   QUEtiapine (SEROQUEL) 25 MG tablet Take 1 tablet by mouth nightly Yes Jean Silvestre MD   ezetimibe-simvastatin (VYTORIN) 10-20 MG per tablet TAKE 1 TABLET BY MOUTH EVERY NIGHT Yes ROZ Ignacio MD   memantine (NAMENDA) 10 MG tablet Take 10 mg by mouth 2 times daily Yes Historical Provider, MD   Multiple Vitamins-Minerals (CENTRUM SILVER ULTRA MENS) TABS Take 1 tablet by mouth daily. Yes Historical Provider, MD   omega-3 acid ethyl esters (LOVAZA) 1 GM capsule Take 1 g by mouth daily.  Yes Historical Provider, MD     Family History  Family History   Problem Relation Age of Onset    High Blood Pressure Mother     High Cholesterol Mother     Diabetes Father     Heart Disease Father     Stroke Father     Stroke Maternal Grandmother     Diabetes Paternal Grandmother     Stroke Paternal Grandmother        Current Medications  Current Outpatient Medications   Medication Sig Dispense Refill    Probiotic Product (PROBIOTIC-10) CAPS Take 1 capsule by mouth daily      vitamin B-12 (CYANOCOBALAMIN) 100 MCG tablet Take 100 mcg by mouth daily      risperiDONE (RISPERDAL) 0.25 MG tablet Take 1 tablet by mouth 2 times daily 60 tablet 2    clopidogrel (PLAVIX) 75 MG tablet TAKE 1 TABLET BY MOUTH DAILY 90 tablet 0    escitalopram (LEXAPRO) 20 MG tablet Take 1 tablet by mouth daily 90 tablet 5    losartan (COZAAR) 100 MG tablet TAKE 1 TABLET BY MOUTH DAILY 90 tablet 5    Donepezil HCl (ARICEPT PO) Take by mouth      QUEtiapine (SEROQUEL) 25 MG tablet Take 1 tablet by mouth nightly 90 tablet 5    ezetimibe-simvastatin (VYTORIN) 10-20 MG per tablet TAKE 1 TABLET BY MOUTH EVERY NIGHT 90 tablet 0    memantine (NAMENDA) 10 MG tablet Take 10 mg by mouth 2 times daily      Multiple Vitamins-Minerals (CENTRUM SILVER ULTRA MENS) TABS Take 1 tablet by mouth daily.  omega-3 acid ethyl esters (LOVAZA) 1 GM capsule Take 1 g by mouth daily. No current facility-administered medications for this visit. REVIEW OF SYSTEMS:    CONSTITUTIONAL: No major weight gain or loss, fatigue, weakness, night sweats or fever. HEENT: No new vision difficulties or ringing in the ears. RESPIRATORY: No new SOB, PND, orthopnea or cough. CARDIOVASCULAR: See HPI  GI: No nausea, vomiting, diarrhea, constipation, abdominal pain or changes in bowel habits. : No urinary frequency, urgency, incontinence hematuria or dysuria. SKIN: No cyanosis or skin lesions. MUSCULOSKELETAL: No new muscle or joint pain. NEUROLOGICAL: No syncope or TIA-like symptoms. PSYCHIATRIC: No anxiety, pain, insomnia or depression    Objective:   PHYSICAL EXAM:        VITALS:    Wt Readings from Last 3 Encounters:   01/23/20 201 lb (91.2 kg)   01/13/20 204 lb 9.6 oz (92.8 kg)   08/29/19 215 lb 6.4 oz (97.7 kg)     BP Readings from Last 3 Encounters:   01/23/20 130/65   01/13/20 116/80   08/29/19 102/66     Pulse Readings from Last 3 Encounters:   01/23/20 63   01/13/20 60   08/29/19 60       CONSTITUTIONAL: Cooperative, no apparent distress, and appears well nourished / developed  NEUROLOGIC:  Awake and orientated to person, place and time. PSYCH: Calm affect. SKIN: Warm and dry. HEENT: Sclera non-icteric, normocephalic, neck supple, no elevation of JVP, normal carotid pulses with no bruits and thyroid normal size. LUNGS:  No increased work of breathing and clear to auscultation, no crackles or wheezing  CARDIOVASCULAR:  Regular rate and rhythm with no murmurs, gallops, rubs, or abnormal heart sounds, normal PMI. The apical impulses not displaced  Heart tones are crisp and normal  Cervical veins are not

## 2020-02-19 NOTE — PROGRESS NOTES
CHIEF COMPLAINT: Hearing loss    HISTORY OF PRESENT ILLNESS:  75 y.o. male who presents with hearing loss, both ears. Came up suddenly in the last several weeks. Associated with an upper respiratory infection. No otalgia. No otorrhea. PAST MEDICAL HISTORY:   Social History     Tobacco Use   Smoking Status Never Smoker   Smokeless Tobacco Never Used                                                    Social History     Substance and Sexual Activity   Alcohol Use No                                                    Current Outpatient Medications:     Probiotic Product (PROBIOTIC-10) CAPS, Take 1 capsule by mouth daily, Disp: , Rfl:     vitamin B-12 (CYANOCOBALAMIN) 100 MCG tablet, Take 100 mcg by mouth daily, Disp: , Rfl:     clopidogrel (PLAVIX) 75 MG tablet, TAKE 1 TABLET BY MOUTH DAILY, Disp: 90 tablet, Rfl: 0    escitalopram (LEXAPRO) 20 MG tablet, Take 1 tablet by mouth daily, Disp: 90 tablet, Rfl: 5    losartan (COZAAR) 100 MG tablet, TAKE 1 TABLET BY MOUTH DAILY, Disp: 90 tablet, Rfl: 5    Donepezil HCl (ARICEPT PO), Take by mouth, Disp: , Rfl:     QUEtiapine (SEROQUEL) 25 MG tablet, Take 1 tablet by mouth nightly, Disp: 90 tablet, Rfl: 5    ezetimibe-simvastatin (VYTORIN) 10-20 MG per tablet, TAKE 1 TABLET BY MOUTH EVERY NIGHT, Disp: 90 tablet, Rfl: 0    memantine (NAMENDA) 10 MG tablet, Take 10 mg by mouth 2 times daily, Disp: , Rfl:     Multiple Vitamins-Minerals (CENTRUM SILVER ULTRA MENS) TABS, Take 1 tablet by mouth daily. , Disp: , Rfl:     omega-3 acid ethyl esters (LOVAZA) 1 GM capsule, Take 1 g by mouth daily. , Disp: , Rfl:                                                  Past Medical History:   Diagnosis Date    Anxiety     BPH     CAD (coronary artery disease)     Cancer ([facility] [address]     Depression     Hearing loss     Hyperlipidemia     Hypertension     Sinusitis     Vertigo                                                     Past Surgical History:   Procedure Laterality Date  ANGIOPLASTY  2009     FAMILY HISTORY: Family history reviewed. Except as noted in history of present illness, there is no pertinent family history      REVIEW OF SYSTEMS:  All pertinent positive and negative review of systems included in HPI. Otherwise, all systems are reviewed and negative. PHYSICAL EXAMINATION:   GENERAL: wdwn- no acute distress  RESPIRATORY:  No stridor or respiratory distress  COMMUNICATION :  Normal voice  MENTAL STATUS:  Mood and affect normal, oriented X 3  HEAD AND FACE:  No abnormalities of the skin of face or head  EXTERNAL EARS AND NOSE:  Normal pinnae bilateral  FACIAL MUSCLES:  All branches of facial nerve intact  EXTRAOCULAR MUSCLES: Intact with full range of motion  FACE PALPATION:  No tenderness over sinuses. Zygomatic arches and orbital rims intact  OTOSCOPY: Cerumen occludes both external auditory canals. TUNING FORKS: Rinne ++ Aragon midline at 512 Hz  INTRANASAL:  Septum midline, turbinates normal, meati clear. LIPS, TEETH, GINGIVA:  Normal mucosa  PHARYNX:  Normal  NECK:  No masses. LYMPHATIC:  No cervical adenopathy  SALIVARY GLANDS:  No swelling or masses in the parotid or submandibular salivary glands  THYROID:  No goiter or thyroid masses. Room and removed from both external auditory canals using suction. Microscope required, hydrogen peroxide required. Tympanic membranes membranes are normal.  Hearing is subjectively improved  IMPRESSION: Improved hearing after cerumen removal.    PLAN: Patient and wife will see if they feel audiogram is indicated. FOLLOW-UP: As needed.

## 2020-03-09 NOTE — ED PROVIDER NOTES
ED Attending Attestation Note     Date of evaluation: 3/9/2020    This patient was seen by the resident. I have seen and examined the patient, agree with the workup, evaluation, management and diagnosis. The care plan has been discussed. My assessment reveals presenting the emergency department complaining of tripped going up some steps and hitting the left side of his head. Exam reveals a 2 to 3 cm laceration over the lateral aspect of the left eyebrow. GCS is 15. Spines are nontender. Benign abdomen. No extremity pain. I was present for the key portions of the laceration repair.      Shane Castro III, MD  03/09/20 [id number]

## 2020-03-09 NOTE — ED TRIAGE NOTES
Pt presents for fall with wife and sister in law. Pt's wife states that the pt has dementia. Pt's wife states the pt when up the stairs without her and fell on the landing and hit his head on the rail. Pt has a small laceration above his left eyebrow, bleeding is controlled. Pt's wife states the pt normally doesn't use any ambulatory aids to get around. MD at the bedside.

## 2020-03-09 NOTE — ED PROVIDER NOTES
[address] ENCOUNTER          EM RESIDENT NOTE       Date of evaluation: 3/9/2020    Chief Complaint     Fall and Laceration      History of Present Illness     Amena Miner is a 75 y.o. male with past medical history of Lewy body dementia, CAD, depression who presents to the emergency department with chief complaint of fall. Patient lives at home, and his wife is his caregiver. Patient is a poor historian secondary to his dementia. Caregiver reports that patient tripped while going up the steps this afternoon and hit his head off a rail. She was in the garage and did not witness the fall, but reports that when she found him, he was at his baseline and did not appear to have lost consciousness. She found him in less than a minute. Patient had an episode of syncope in January, and was wearing a 24-hour EEG monitor at the time. He did pull this off during the fall. Patient was able to get up and get his clothes changed prior to coming to the emergency department. He sustained a small forehead laceration during the fall. At this time, patient denies headache, vision change, chest pain, shortness of breath, abdominal pain, nausea, vomiting, extremity pain. Patient's wife reports that he normally would be able to describe pain if he did have it. Review of Systems   Please see HPI for pertinent positives and negatives. All other systems reviewed and negative. Past Medical, Surgical, Family, and Social History     He has a past medical history of Anxiety, BPH, CAD (coronary artery disease), Cancer ([facility] [address] Depression, Hearing loss, Hyperlipidemia, Hypertension, Sinusitis, and Vertigo. He has a past surgical history that includes angioplasty (2009).   His family history includes Diabetes in his father and paternal grandmother; Heart Disease in his father; High Blood Pressure in his mother; High Cholesterol in his mother; Stroke in his father, maternal grandmother, and paternal grandmother. He reports that he has never smoked. He has never used smokeless tobacco. He reports that he does not drink alcohol or use drugs. Medications     Previous Medications    CLOPIDOGREL (PLAVIX) 75 MG TABLET    TAKE 1 TABLET BY MOUTH DAILY    CLOPIDOGREL (PLAVIX) 75 MG TABLET    Take 1 tablet by mouth daily    DONEPEZIL HCL (ARICEPT PO)    Take by mouth    ESCITALOPRAM (LEXAPRO) 20 MG TABLET    Take 1 tablet by mouth daily    EZETIMIBE-SIMVASTATIN (VYTORIN) 10-20 MG PER TABLET    TAKE 1 TABLET BY MOUTH EVERY NIGHT    LOSARTAN (COZAAR) 100 MG TABLET    TAKE 1 TABLET BY MOUTH DAILY    MEMANTINE (NAMENDA) 10 MG TABLET    Take 10 mg by mouth 2 times daily    MULTIPLE VITAMINS-MINERALS (CENTRUM SILVER ULTRA MENS) TABS    Take 1 tablet by mouth daily. OMEGA-3 ACID ETHYL ESTERS (LOVAZA) 1 GM CAPSULE    Take 1 g by mouth daily. PROBIOTIC PRODUCT (PROBIOTIC-10) CAPS    Take 1 capsule by mouth daily    QUETIAPINE (SEROQUEL) 25 MG TABLET    Take 1 tablet by mouth nightly    VITAMIN B-12 (CYANOCOBALAMIN) 100 MCG TABLET    Take 100 mcg by mouth daily       Allergies     He has No Known Allergies. Physical Exam     INITIAL VITALS: BP: (!) 140/86, Temp: 98.6 °F (37 °C), Pulse: 61, Resp: 18, SpO2: 100 %   Physical Exam  Constitutional:       General: He is not in acute distress. Appearance: He is not diaphoretic. HENT:      Head: Normocephalic. Comments: Small 3 cm laceration present above left eyebrow. Nose: Nose normal.      Mouth/Throat:      Mouth: Mucous membranes are moist.   Eyes:      Extraocular Movements: Extraocular movements intact. Pupils: Pupils are equal, round, and reactive to light. Neck:      Musculoskeletal: Normal range of motion. No muscular tenderness. Cardiovascular:      Rate and Rhythm: Normal rate and regular rhythm. Pulmonary:      Effort: Pulmonary effort is normal. No respiratory distress. Breath sounds: Normal breath sounds. No wheezing. patient was also evaluated by the attending physician. All care plans werediscussed and agreed upon. Clinical Impression     1.  Facial laceration, initial encounter        Disposition     PATIENT REFERRED TO:  Mary Perez MD  [phone]  [address] [id number] [phone]    Schedule an appointment as soon as possible for a visit in 5 days  For suture removal      DISCHARGE MEDICATIONS:  New Prescriptions    No medications on file       DISPOSITION Decision To Discharge 03/09/2020 06:25:11 PM      Salima Sierra MD  Resident  03/09/20 [id number]

## 2020-03-18 NOTE — ED NOTES
Pt placed in gown with urinal in place to attempt to obtain urine specimen.       Amalia Reddy RN  03/17/20 9433

## 2020-03-18 NOTE — ED PROVIDER NOTES
1 [facility]  EMERGENCY DEPARTMENT ENCOUNTER          ATTENDING PHYSICIAN NOTE       Date of evaluation: 3/17/2020    Chief Complaint     Hematuria      History of Present Illness     Rene Comer is a 75 y.o. male who presents to the emergency department for hematuria. Patient has a history of dementia and is unable to provide history so history is obtained to the patient's wife. She states that this evening he used the urinal and she noted that his urine was dark bloody. He has had no fevers or chills. He has been slightly more aggressive than usual but otherwise has been at his baseline. She states that he has had urinary tract infections in the past.  He is on Plavix but otherwise on no other anticoagulation. Review of Systems     Review of Systems   Unable to perform ROS: Dementia       Past Medical, Surgical, Family, and Social History     He has a past medical history of Anxiety, BPH, CAD (coronary artery disease), Cancer ([address] Depression, Hearing loss, Hyperlipidemia, Hypertension, Sinusitis, and Vertigo. He has a past surgical history that includes angioplasty (2009). His family history includes Diabetes in his father and paternal grandmother; Heart Disease in his father; High Blood Pressure in his mother; High Cholesterol in his mother; Stroke in his father, maternal grandmother, and paternal grandmother. He reports that he has never smoked. He has never used smokeless tobacco. He reports that he does not drink alcohol or use drugs.     Medications     Previous Medications    CLOPIDOGREL (PLAVIX) 75 MG TABLET    TAKE 1 TABLET BY MOUTH DAILY    CLOPIDOGREL (PLAVIX) 75 MG TABLET    Take 1 tablet by mouth daily    DONEPEZIL HCL (ARICEPT PO)    Take by mouth    ESCITALOPRAM (LEXAPRO) 20 MG TABLET    Take 1 tablet by mouth daily    EZETIMIBE-SIMVASTATIN (VYTORIN) 10-20 MG PER TABLET    TAKE 1 TABLET BY MOUTH EVERY NIGHT    LOSARTAN (COZAAR) 100 MG TABLET    TAKE 1 TABLET BY MOUTH DAILY MEMANTINE (NAMENDA) 10 MG TABLET    Take 10 mg by mouth 2 times daily    MULTIPLE VITAMINS-MINERALS (CENTRUM SILVER ULTRA MENS) TABS    Take 1 tablet by mouth daily. OMEGA-3 ACID ETHYL ESTERS (LOVAZA) 1 GM CAPSULE    Take 1 g by mouth daily. PROBIOTIC PRODUCT (PROBIOTIC-10) CAPS    Take 1 capsule by mouth daily    QUETIAPINE (SEROQUEL) 25 MG TABLET    Take 1 tablet by mouth nightly    VITAMIN B-12 (CYANOCOBALAMIN) 100 MCG TABLET    Take 100 mcg by mouth daily       Allergies     He has No Known Allergies. Physical Exam     INITIAL VITALS: BP: (!) 149/87, Temp: 98.1 °F (36.7 °C), Pulse: 57, Resp: 18, SpO2: 100 %   Physical Exam  Vitals signs and nursing note reviewed. Constitutional:       General: He is not in acute distress. Appearance: Normal appearance. Cardiovascular:      Rate and Rhythm: Normal rate and regular rhythm. Heart sounds: Normal heart sounds. Pulmonary:      Effort: Pulmonary effort is normal.      Breath sounds: Normal breath sounds. No wheezing, rhonchi or rales. Abdominal:      General: Bowel sounds are normal.      Palpations: Abdomen is soft. Tenderness: There is no abdominal tenderness. There is no guarding or rebound. Neurological:      Mental Status: He is alert. Comments: Oriented to self only which is baseline per family.          Diagnostic Results     LABS:   Results for orders placed or performed during the hospital encounter of 03/17/20   Urinalysis, reflex to microscopic (Lab)   Result Value Ref Range    Color, UA Yellow Straw/Yellow    Clarity, UA Clear Clear    Glucose, Ur 100 (A) Negative mg/dL    Bilirubin Urine LARGE (A) Negative    Ketones, Urine 15 (A) Negative mg/dL    Specific Gravity, UA 1.020 1.005 - 1.030    Blood, Urine LARGE (A) Negative    pH, UA 6.5 5.0 - 8.0    Protein, UA >=300 (A) Negative mg/dL    Urobilinogen, Urine >=8.0 (A) <2.0 E.U./dL    Nitrite, Urine POSITIVE (A) Negative    Leukocyte Esterase, Urine MODERATE (A) Negative    Microscopic Examination YES     Urine Type Voided    Microscopic Urinalysis   Result Value Ref Range    WBC, UA 0-2 0 - 5 /HPF    RBC, UA >100 (A) 0 - 4 /HPF    Bacteria, UA 3+ (A) None Seen /HPF    Yeast, UA Present (A) None Seen /HPF    Urinalysis Comments see below      RECENT VITALS:  BP: 138/81,Temp: 98.1 °F (36.7 °C), Pulse: 57, Resp: 18, SpO2: 100 %     Procedures     N/A    ED Course     Nursing Notes, Past Medical Hx, Past Surgical Hx, Social Hx,Allergies, and Family Hx were reviewed. patient was given the following medications:  Orders Placed This Encounter   Medications    cephALEXin (KEFLEX) capsule 500 mg    cephALEXin (KEFLEX) 250 MG/5ML suspension     Sig: Take 10 mLs by mouth 3 times daily for 7 days     Dispense:  210 mL     Refill:  0       CONSULTS:  None    MEDICAL DECISIONMAKING / ASSESSMENT / Lindsey Bulmaro is a 75 y.o. male with history of dementia presents for hematuria. Patient is oriented to self only which is baseline per family. He has a soft abdomen on exam with no suprapubic tenderness noted. Nursing staff did perform a bladder scan which showed 63 mL of urine in the bladder so I do not feel he is retaining urine. Straight cath was performed for urine sample which shows evidence of urinary tract infection with positive nitrite and leukocyte esterase and 3+ bacteria. Patient will be started on antibiotics and will be instructed to follow-up with his primary care provider for further evaluation. Clinical Impression     1.  Hemorrhagic cystitis        Disposition     PATIENT REFERRED TO:  Dimitry Rodriguez MD  [phone]  [address] [facility]            DISCHARGE MEDICATIONS:  New Prescriptions    CEPHALEXIN (KEFLEX) 250 MG/5ML SUSPENSION    Take 10 mLs by mouth 3 times daily for 7 days       Dorian Bhat MD  03/18/20 0002

## 2020-04-09 PROBLEM — G93.41 ACUTE METABOLIC ENCEPHALOPATHY: Status: ACTIVE | Noted: 2020-01-01

## 2020-04-09 NOTE — PROGRESS NOTES
4 Eyes Admission Assessment     I agree as the admission nurse that 2 RN's have performed a thorough Head to Toe Skin Assessment on the patient. ALL assessment sites listed below have been assessed on admission. Areas assessed by both nurses:   Torrey Em    [x]   Head, Face, and Ears   [x]   Shoulders, Back, and Chest  [x]   Arms, Elbows, and Hands   [x]   Coccyx, Sacrum, and Ischum  [x]   Legs, Feet, and Heels        Does the Patient have Skin Breakdown? No         Ghulam Prevention initiated:  Yes  Wound Care Orders initiated:  No      [facility] nurse consulted for Pressure Injury (Stage 3,4, Unstageable, DTI, NWPT, and Complex wounds):  N/A    Patient has abrasion to right ankle, scattered abrasion on LFA, area of edema LFA, rash left buttocks, scattered bruising.     Nurse 1 eSignature: Electronically signed by Samuel Ballard RN on 4/9/20 at 6:54 PM EDT    **SHARE this note so that the co-signing nurse is able to place an eSignature**    Nurse 2 eSignature: Electronically signed by Danielito Charles RN on 4/9/20 at 6:55 PM EDT

## 2020-04-09 NOTE — ED NOTES
Attempted to draw labs, unsuccessful, pt swinging arms, and when established IV before securing pt swung arm and IV came out.       Ginny Williamson RN  04/09/20 [id number]

## 2020-04-09 NOTE — ED PROVIDER NOTES
[address]          ATTENDING PHYSICIAN NOTE       Date of evaluation: 4/9/2020    Chief Complaint     Cough      History of Present Illness     Carlos Cordero is a 75 y.o. male who presents with change in mental status and cough. Patient has history of Lewy body dementia and is unable to provide any history or meaningfully participate in exam; the patient's wife reports that last week he was at his baseline mental status but began to get more aggressive, and had his dose of Depakote increased 6 days ago; she reports that since that time she is noted to become progressively more somnolent, completely nonverbal, and will not move. Normally patient would reportedly be able to whisper some words and can eat and drink;  pts wife reports in last few days she can get him to occasionally swallow and with great trouble was able to get the patient drinking Ensure, but his p.o. intake has been markedly down otherwise, and no longer able to speak. He is also had a cough during this time, really not able to produce anything. No fever noted at home. No BMs for the last 5 days. No vomiting. Review of Systems     Review of Systems  Unable to obtain due to patient's dementia and altered mental status. Past Medical, Surgical, Family, and Social History     He has a past medical history of Anxiety, BPH, CAD (coronary artery disease), Cancer ([address] Depression, Hearing loss, Hyperlipidemia, Hypertension, Sinusitis, and Vertigo. He has a past surgical history that includes angioplasty (2009). His family history includes Diabetes in his father and paternal grandmother; Heart Disease in his father; High Blood Pressure in his mother; High Cholesterol in his mother; Stroke in his father, maternal grandmother, and paternal grandmother. He reports that he has never smoked. He has never used smokeless tobacco. He reports that he does not drink alcohol or use drugs.     Medications Pulse: 60, Resp: 15, SpO2: 98 %   Physical Exam  Constitutional:       General: He is not in acute distress. Appearance: He is not diaphoretic. Comments: Nontoxic-appearing elderly male, lying in bed with eyes closed; will not respond to examiner on questioning, localizes painful stimulus. HENT:      Head: Normocephalic and atraumatic. Right Ear: Tympanic membrane normal.      Left Ear: Tympanic membrane normal.      Nose: Nose normal.      Mouth/Throat:      Mouth: Mucous membranes are dry. Neck:      Comments: Unable to passively rotate head on exam   Cardiovascular:      Rate and Rhythm: Normal rate and regular rhythm. Pulmonary:      Effort: No respiratory distress. Breath sounds: Rhonchi present. No wheezing or rales. Abdominal:      General: There is no distension. Palpations: Abdomen is soft. Musculoskeletal:      Comments: No edema/ deformity/ wounds    Lymphadenopathy:      Cervical: No cervical adenopathy. Skin:     Comments: Skin cool   Neurological:      Comments: DURON spontaneously with equal strength; localized pain on exam.  Does not follow commands. PERRL. Diagnostic Results     EKG   Sinus rhythm, rate 71 bpm, QRS duration 68 ms,  ms; no pathologic ST elevation is noted    RADIOLOGY:  CT CHEST ABDOMEN PELVIS WO CONTRAST   Final Result      No cause for acute abdominal pain identified. Prostatomegaly. Air-filled small and large bowel loops without evidence of obstruction. XR Acute Abd Series Chest 1 VW   Final Result      CT Head WO Contrast   Final Result      Age-related changes without acute intracranial abnormality.                 LABS:   Results for orders placed or performed during the hospital encounter of 04/09/20   CBC Auto Differential   Result Value Ref Range    WBC 5.0 4.0 - 11.0 K/uL    RBC 4.67 4.20 - 5.90 M/uL    Hemoglobin 14.1 13.5 - 17.5 g/dL    Hematocrit 42.9 40.5 - 52.5 %    MCV 91.8 80.0 - 100.0 fL    MCH 30.2 26.0 - 34.0 pg    MCHC 32.9 31.0 - 36.0 g/dL    RDW 15.1 12.4 - 15.4 %    Platelets 342 674 - 398 K/uL    MPV 8.8 5.0 - 10.5 fL    Neutrophils % 58.3 %    Lymphocytes % 31.4 %    Monocytes % 8.0 %    Eosinophils % 1.7 %    Basophils % 0.6 %    Neutrophils Absolute 2.9 1.7 - 7.7 K/uL    Lymphocytes Absolute 1.6 1.0 - 5.1 K/uL    Monocytes Absolute 0.4 0.0 - 1.3 K/uL    Eosinophils Absolute 0.1 0.0 - 0.6 K/uL    Basophils Absolute 0.0 0.0 - 0.2 K/uL   Comprehensive Metabolic Panel w/ Reflex to MG   Result Value Ref Range    Sodium 149 (H) 136 - 145 mmol/L    Potassium reflex Magnesium 4.3 3.5 - 5.1 mmol/L    Chloride 109 99 - 110 mmol/L    CO2 26 21 - 32 mmol/L    Anion Gap 14 3 - 16    Glucose 105 (H) 70 - 99 mg/dL    BUN 16 7 - 20 mg/dL    CREATININE 1.7 (H) 0.8 - 1.3 mg/dL    GFR Non-African American 39 (A) >60    GFR  48 (A) >60    Calcium 9.3 8.3 - 10.6 mg/dL    Total Protein 7.2 6.4 - 8.2 g/dL    Alb 4.1 3.4 - 5.0 g/dL    Albumin/Globulin Ratio 1.3 1.1 - 2.2    Total Bilirubin 0.7 0.0 - 1.0 mg/dL    Alkaline Phosphatase 62 40 - 129 U/L    ALT 23 10 - 40 U/L    AST 29 15 - 37 U/L    Globulin 3.1 g/dL   Troponin   Result Value Ref Range    Troponin 0.02 (H) <0.01 ng/mL   Brain Natriuretic Peptide   Result Value Ref Range    Pro-BNP 68 0 - 449 pg/mL   Urinalysis, reflex to microscopic   Result Value Ref Range    Color, UA Yellow Straw/Yellow    Clarity, UA Clear Clear    Glucose, Ur Negative Negative mg/dL    Bilirubin Urine Negative Negative    Ketones, Urine 15 (A) Negative mg/dL    Specific Gravity, UA >=1.030 1.005 - 1.030    Blood, Urine MODERATE (A) Negative    pH, UA 6.0 5.0 - 8.0    Protein, UA TRACE (A) Negative mg/dL    Urobilinogen, Urine 0.2 <2.0 E.U./dL    Nitrite, Urine Negative Negative    Leukocyte Esterase, Urine Negative Negative    Microscopic Examination YES     Urine Type NotGiven    Lactic Acid, Plasma   Result Value Ref Range    Lactic Acid 3.1 (H) 0.4 - 2.0 mmol/L Blood Gas, Venous   Result Value Ref Range    pH, Marty 7.309 (L) 7.350 - 7.450    pCO2, Marty 64.4 (H) 41.0 - 51.0 mmHg    pO2, Marty 20.5 (L) 25.0 - 40.0 mmHg    HCO3, Venous 31.4 (H) 24.0 - 28.0 mmol/L    Base Excess, Marty 3.6 (H) -2.0 - 3.0 mmol/L    O2 Sat, Marty 20 Not established %    Carboxyhemoglobin 1.1 0.0 - 1.5 %    MetHgb, Marty 0.5 0.0 - 1.5 %    TC02 (Calc), Marty 33 mmol/L    Hemoglobin, Marty, Reduced 78.60 %   Depakote Level   Result Value Ref Range    Valproic Acid Lvl 85.7 50.0 - 100.0 ug/mL   Microscopic Urinalysis   Result Value Ref Range    Hyaline Casts, UA 3-5 (A) 0 - 2 /LPF    Mucus, UA 1+ (A) None Seen /LPF    WBC, UA 0-2 0 - 5 /HPF    RBC, UA 5-10 (A) 0 - 4 /HPF    Epithelial Cells, UA 0-1 0 - 5 /HPF    Bacteria, UA 1+ (A) None Seen /HPF   Ammonia   Result Value Ref Range    Ammonia 51 16 - 60 umol/L   Vitamin B12   Result Value Ref Range    Vitamin B-12 >2000 (H) 211 - 911 pg/mL   TSH with Reflex   Result Value Ref Range    TSH 1.65 0.27 - 4.20 uIU/mL   Magnesium   Result Value Ref Range    Magnesium 2.20 1.80 - 2.40 mg/dL   Phosphorus   Result Value Ref Range    Phosphorus 3.6 2.5 - 4.9 mg/dL   EKG 12 Lead   Result Value Ref Range    Ventricular Rate 71 BPM    Atrial Rate 72 BPM    QRS Duration 68 ms    Q-T Interval 522 ms    QTc Calculation (Bazett) 567 ms    R Axis 46 degrees    T Axis 95 degrees    Diagnosis       EKG performed in ER and to be interpreted by ER physician. Confirmed by MD, ER ([id number]),  Tiffany Thurman ([phone]) on 4/9/2020 2:30:45 PM       ED BEDSIDE ULTRASOUND:      RECENT VITALS:  BP: (!) 154/75,Temp: 98.6 °F (37 °C), Pulse: 58, Resp: 16, SpO2: 97 %     Procedures         ED Course     Nursing Notes, Past Medical Hx, Past Surgical Hx, Social Hx,Allergies, and Family Hx were reviewed.     patient was given the following medications:  Orders Placed This Encounter   Medications    lactated ringers infusion 1,000 mL    sodium chloride flush 0.9 % injection 10 mL    sodium chloride

## 2020-04-09 NOTE — ED NOTES
Unsuccessful at getting Urine by straight cath x2 RN's and 3 total attempts, MD aware.      Julia Mcfarlane RN  04/09/20 9020

## 2020-04-09 NOTE — PROGRESS NOTES
Patient arrived to room 6309. Patient in bilateral wrist restraints due to aggression and combativeness. Patient brief saturated. Patient cleaned and skin assessed. Bed alarm on. Tele orders in. Placed on telemetry.

## 2020-04-09 NOTE — H&P
History and Physical    Admit Date: 4/9/2020    Patient's PCP: Dr. Marisela Walsh MD     Chief complaint: Mental status changes x 6 days. HISTORY OF PRESENT ILLNESS:    This is a very pleasant 75 y.o. male with a history of advanced dementia, hypertension, CAD status post stent in 2009, BPH, depression and anxiety who presented from home, with wife, due to mental status changes. History is obtained from wife by phone, as patient is unable to participate in providing history due to cognitive impairment. Patient came from home where wife is a primary caregiver. She indicates that he has advanced dementia at baseline. Over the past 6 days he has been noted with a decline in overall functioning and mental status from his baseline. Prior to this patient had had increased agitation and combativeness for which wife had called patient's neurologist] Dr. Wendy Goff and had had Depakote prescribed to help with this. Wife says since then patient has become more lethargic, less responsive, with diminished oral intake. On account of this he was brought to the emergency room. He has not been noted with any fever or chills. He has had increased cough but no obvious shortness of breath. Wife has been trying to feed him at home and give him his medications but this has been with difficulty due to his decreased mental status. Of note he was in the emergency room around middle of last month for a UTI. In the emergency room he was afebrile. Vital signs were satisfactory. Labs showed no leukocytosis. His CBC was essentially normal.  His history of profile however showed hyponatremia of 149, and elevated creatinine of 1.7 up from baseline of 1.4-1.5. BUN was normal at 16 and blood sugar 105. He had CT scan of his head, chest and abdomen/pelvis, with findings notable for age-related changes without acute intracranial abnormality; secretions in the distal bronchial tree;  Air-filled small and large

## 2020-04-10 NOTE — PROGRESS NOTES
due to dementia, pt does not appear in pain)    Orientation  Impaired  Orientation Level: Unable to assess(Pt non-verbal due to advanced dementia, unable to state name)    Social/Functional History  Lives With: Spouse  Additional Comments: Pt unable to provide history. Objective  Strength  Other: Pt unable to follow commands for MMT. Observed LE strength >3/5 BLEs through movement in bed.      Bed mobility  Supine to Sit: Dependent/Total(x2 assist- unable to participate/follow commands due to dementia)  Sit to Supine: Dependent/Total(x2 assist)     Transfers  Sit to Stand: Dependent/Total(x2 assist from EOB twice, pt unable to initiate/participate in activity due to dementia)  Stand to sit: Dependent/Total(x2 assist)  Bed to Chair: Unable to assess     Ambulation  Ambulation?: No(Unable to assess)     Balance  Sitting - Static: Fair;-(SBA/CGA initially with worsening L sided lean with time, posture is rigid and pt maintained L sided lean without loss of balance)  Sitting - Dynamic: Poor  Standing - Static: Poor  Standing - Dynamic: Poor    Treatment included transfer training with patient education     Plan: Discharge acute PT      Safety Devices  Type of devices: Left in bed, Bed alarm in place, Call light within reach, Nurse notified      AM-PAC Score  AM-PAC Inpatient Mobility Raw Score : 6 (04/10/20 1154)  AM-PAC Inpatient T-Scale Score : 23.55 (04/10/20 1154)  Mobility Inpatient CMS 0-100% Score: 100 (04/10/20 1154)  Mobility Inpatient CMS G-Code Modifier : CN (04/10/20 1154)      Therapy Time   Individual Concurrent Group Co-treatment   Time In 1108         Time Out 1146         Minutes 38         Timed Code Treatment Minutes:  25  Total Treatment Minutes:  [address] [facility] 21, PT

## 2020-04-10 NOTE — PROGRESS NOTES
home     CT head- neg,   CT Chest /abd -neg             PMHX: HTN,HLD,CAD,Depression, BPH, Anxiety and Lewy Body Dementia   Family / Caregiver Present: No  Diagnosis: AMS / Dehydration   Subjective  Subjective: \"yeah\" Pt in bed eyes open - will track at times. Pt mostly nonverbal but a few verbalizations at times. Pt demo brief increased alertness with playing music  Patient Currently in Pain: Other (comment)    Social/Functional History  Social/Functional History  Lives With: Spouse  Additional Comments: Pt unable to provide history. Objective  Treatment included functional transfer training, ADL's and pt. education. Orientation  Overall Orientation Status: Impaired  Orientation Level: Oriented to person(question understanding - unable to state name )     Balance  Sitting Balance:  Moderate assistance(initially improved to CGA with significant L sided lean --pt resistive to sitting upright/  unable to change balance with max v.cues )  Standing Balance: Dependent/Total(Mod /Max A x 2 with Bilateral HHA -- posterior lean )  Standing Balance  Time: 1 mins x 1 and 20 sec x 1   Activity: functional stance / transfer attempts   Functional Mobility  Functional Mobility Comments: unable to attempt 2/2 safety   Toilet Transfers  Toilet Transfers Comments: Unable to attempt -pt unable to follow directives   ADL  Feeding: Maximum assistance;Dependent/Total  Grooming: Dependent/Total  LE Dressing: Dependent/Total  Toileting: Dependent/Total(hawkins cath in place)  Additional Comments: pt unable to assist /participate in ADLs -- Unable to follow any directives   Quality of Movement Other  Comment: Pt with global tightness - pt unable to participate in BUE functional assessment      Bed mobility  Supine to Sit: Dependent/Total(x2 assist- unable to participate/follow commands due to dementia)  Sit to Supine: Dependent/Total(x2 assist)  Transfers  Sit to stand: Dependent/Total;2 Person assistance;Maximum assistance  Stand

## 2020-04-10 NOTE — PROGRESS NOTES
1.5*   GLUCOSE 105* 127* 128*     Recent Labs     04/09/20  1443   AST 29   ALT 23   BILITOT 0.7   ALKPHOS 62     Recent Labs     04/09/20  1443 04/10/20  0514   TROPONINI 0.02* 0.02*     No results for input(s): BNP in the last 72 hours. No results for input(s): CHOL, HDL in the last 72 hours. Invalid input(s): LDLCALCU  No results for input(s): INR in the last 72 hours. Assessment & Plan:    Patient Active Problem List:     Lipidemia     CAD (coronary artery disease)     HTN (hypertension)     Elevated PSA     Prostate cancer (HCC)     Vertigo     Neck pain     Venous stasis     Ischemic vascular dementia ([facility] [address]     Acute metabolic encephalopathy      76 yo admitted with acute metabolic encephalopathy admitted with dehydration I believe this was all related to recent depakote which was started last week, his level was on upper limit of normal. Continue with 24 hours of hydration as he is slightly better in afternoon than when I saw him this morning. His urine does not look infected, his restraints can be removed. Also, when mental status is much better his seroquel may be resumed, its probably better going up on seroquel as opposed to adding another agent. His trooponin was 0.02 ecg is non ischemic, reviewed care everywehre, he does not have recent echo, will get one as well. Continue with hydration and monitoring. Hypernatremia and acute renal failure present on admission secondary to dehydration also improved. The patient and / or the family were informed of the results of any tests, a time was given to answer questions, a plan was proposed and they agreed with plan.   Disposition: anticipate discharge once a little better maybe tomorrow discussed case with wife Holley Bateman at [phone]   Full Code      Radha Quinn MD FACP

## 2020-04-10 NOTE — PROGRESS NOTES
Speech Language Pathology  Facility/Department: [facility],[address] EVALUATION    NAME: Eva Mcfarland  : 1945  MRN: [id number]    ADMISSION DATE: 2020  ADMITTING DIAGNOSIS: has Lipidemia; CAD (coronary artery disease); HTN (hypertension); Elevated PSA; Prostate cancer ([facility] [address] Vertigo; Neck pain; Venous stasis; Ischemic vascular dementia ([facility] [address] and Acute metabolic encephalopathy on their problem list.  ONSET DATE: 2020    Recent Chest Xray not completed    CT of Chest 2020     No cause for acute abdominal pain identified.       Prostatomegaly.       Air-filled small and large bowel loops without evidence of obstruction.         CT head 2020  Age-related changes without acute intracranial abnormality.         Date of Eval: 4/10/2020  Evaluating Therapist: Davis Marie    Current Diet level:  Current Diet : NPO  Current Liquid Diet : NPO    Primary Complaint  Patient Complaint: notes state pt with decreased intake and consumption of medications    Pain:  Pain Assessment  Pain Assessment: Advanced Dementia - no pain indicated through any means    Reason for Referral  Eva Mcfarland was referred for a bedside swallow evaluation to assess the efficiency of his swallow function, identify signs and symptoms of aspiration and make recommendations regarding safe dietary consistencies, effective compensatory strategies, and safe eating environment. HISTORY OF PRESENT ILLNESS:    This is a very pleasant 75 y.o. male with a history of advanced dementia, hypertension, CAD status post stent in , BPH, depression and anxiety who presented from home, with wife, due to mental status changes. History is obtained from wife by phone, as patient is unable to participate in providing history due to cognitive impairment. Patient came from home where wife is a primary caregiver. She indicates that he has advanced dementia at baseline.   Over the past 6 days he has been

## 2020-04-10 NOTE — PROGRESS NOTES
Introduced self to patient. Initial assessment complete see flow sheet, VSS, ADILSON orientation. Fall precautions in place. Bed alarm in place. Pt unable to voiced needs at this time. Call light within reach, will continue to monitor. Pt currently in restraints.  Order will  at [phone] on 4/10/20

## 2020-04-11 NOTE — PLAN OF CARE
Problem: Falls - Risk of:  Goal: Will remain free from falls  Description: Will remain free from falls  Outcome: Ongoing  Note: Patient is a fall risk. Patient is unable to get out of bed at this time. See Fall Risk assessment for details. Bed is in low, lock position; call light/belongings within reach. No attempts to get out of bed have been made, calls appropriately when assistance is needed. Bed alarm and hourly rounds in place for safety. Will continue to monitor and reassess throughout shift. Problem: Falls - Risk of:  Goal: Absence of physical injury  Description: Absence of physical injury  Outcome: Ongoing     Problem: Injury - Risk of, Physical Injury:  Goal: Will remain free from falls  Description: Will remain free from falls  Outcome: Ongoing  Note: Patient is a fall risk. Patient is unable to get out of bed at this time. See Fall Risk assessment for details. Bed is in low, lock position; call light/belongings within reach. No attempts to get out of bed have been made, calls appropriately when assistance is needed. Bed alarm and hourly rounds in place for safety. Will continue to monitor and reassess throughout shift. Problem: Injury - Risk of, Physical Injury:  Goal: Absence of physical injury  Description: Absence of physical injury  Outcome: Ongoing     Problem: Mood - Altered:  Goal: Mood stable  Description: Mood stable  Outcome: Ongoing  Note: Patient isn't A and O, is mumbling, not speaking, and very lethargic. Problem: Mood - Altered:  Goal: Absence of abusive behavior  Description: Absence of abusive behavior  Outcome: Ongoing  Note: Is not showing abusive behavior.

## 2020-04-11 NOTE — PROGRESS NOTES
Speech Language Pathology    Chart reviewed and d/w RN. Recommendation made for full liquid with 1:1 assist yesterday per BSE but RNs reporting pt too altered / lethargic to take PO safely - order still for NPO in Epic. Attempted to see pt for dysphagia f/u but pt getting echo. Will re-attempt as pt available and schedule permits. Burak Swartz MA CCC-SLP; [phone]  Speech-Language Pathologist  Pager # [phone] 4/11/20 @ 1225  Attempted to see pt for dysphagia f/u - RN reported pt orally resistive with oral care earlier this AM. Attempted thin water, ice chips, and puree via tsp as well as thins via cup edge. With all offerings of PO, pt closed lips tightly and no indication of oral acceptance exhibited despite encouragement and verbal / tactile stimuli. Unable to assess pt's swallow function at this time given unwillingness to accept PO. Prognosis for improvement in swallow function that will result in adequate PO intake within next 24 hours is guarded. Tx team may want to consider temporary alternative means of nutrition at this time. D/W RN.     Burak Swartz MA CCC-SLP; [phone]  Speech-Language Pathologist  Pager # [phone]

## 2020-04-11 NOTE — PROGRESS NOTES
Hospitalist Progress Note      PCP: Ran Hernandez MD    Date of Admission: 4/9/2020    Chief Complaint: Encephalopathy    Hospital Course: 75 y.o. male with a history of advanced dementia, hypertension, CAD status post stent in 2009, BPH, depression and anxiety who presented from home, with wife, due to mental status changes. Subjective:  patient unable to follow commands in spite of asking multiple times he continues to stare blankly without responding. Medications:  Reviewed    Infusion Medications    dextrose 5 % and 0.45 % NaCl 100 mL/hr at 04/11/20 1302     Scheduled Medications    sodium chloride flush  10 mL Intravenous 2 times per day    enoxaparin  40 mg Subcutaneous Daily     PRN Meds: sodium chloride flush, acetaminophen **OR** acetaminophen, bisacodyl, ondansetron **OR** ondansetron      Intake/Output Summary (Last 24 hours) at 4/11/2020 1450  Last data filed at 4/11/2020 1302  Gross per 24 hour   Intake 2606.08 ml   Output 525 ml   Net 2081.08 ml       Physical Exam Performed:    /72   Pulse 53   Temp 97.4 °F (36.3 °C) (Oral)   Resp 16   Ht 6' 2\" (1.88 m)   Wt 187 lb 9.8 oz (85.1 kg)   SpO2 93%   BMI 24.09 kg/m²     General appearance: No apparent distress, chronically ill-appearing, encephalopathic  HEENT: Pupils equal, round, and reactive to light. Conjunctivae/corneas clear. Neck: Supple, with full range of motion. No jugular venous distention. Trachea midline. Respiratory:  Normal respiratory effort. Clear to auscultation, bilaterally without Rales/Wheezes/Rhonchi. Cardiovascular: Regular rate and rhythm with normal S1/S2 without murmurs, rubs or gallops. Abdomen: Soft, non-tender, non-distended with normal bowel sounds. Musculoskeletal: No clubbing, cyanosis or edema bilaterally. Skin: Skin color, texture, turgor normal.  No rashes or lesions. Neurologic: Unable to assess as patient is not able to follow commands  Psychiatric: Awake, alert .    Capillary Refill: Brisk,< 3 seconds   Peripheral Pulses: +2 palpable, equal bilaterally       Labs:   Recent Labs     04/09/20  1443 04/10/20  0514 04/11/20  0512   WBC 5.0 3.9* 4.6   HGB 14.1 13.1* 12.8*   HCT 42.9 40.3* 37.9*    125* 123*     Recent Labs     04/09/20  1757 04/10/20  0108 04/10/20  0514 04/10/20  1733   NA  --  142 144 142   K  --  3.7 3.9 3.9   CL  --  106 108 105   CO2  --  25 23 25   BUN  --  15 15 13   CREATININE  --  1.4* 1.5* 1.3   CALCIUM  --  8.8 8.8 8.8   PHOS 3.6  --   --   --      Recent Labs     04/09/20  1443   AST 29   ALT 23   BILITOT 0.7   ALKPHOS 62     No results for input(s): INR in the last 72 hours. Recent Labs     04/09/20  1443 04/10/20  0514   TROPONINI 0.02* 0.02*       Urinalysis:      Lab Results   Component Value Date    NITRU Negative 04/09/2020    WBCUA 0-2 04/09/2020    BACTERIA 1+ 04/09/2020    RBCUA 5-10 04/09/2020    BLOODU MODERATE 04/09/2020    SPECGRAV >=1.030 04/09/2020    GLUCOSEU Negative 04/09/2020       Radiology:  CT CHEST ABDOMEN PELVIS WO CONTRAST   Final Result      No cause for acute abdominal pain identified. Prostatomegaly. Air-filled small and large bowel loops without evidence of obstruction. XR Acute Abd Series Chest 1 VW   Final Result      CT Head WO Contrast   Final Result      Age-related changes without acute intracranial abnormality.                     Assessment/Plan:    Active Hospital Problems    Diagnosis    Acute metabolic encephalopathy [F83.79]        Acute toxic metabolic encephalopathy  -Likely multifactorial: Probable toxic encephalopathy from Depakote; metabolic encephalopathy from hypernatremia, dehydration on underlying dementia  -Neurology following  -CT head unremarkable  -Vitamin B12, ammonia, TSH all unremarkable  -Hold Depakote for now  -Speech eval to follow     Hypernatremia, resolved  -Likely secondary to hypovolemia from low oral intake    Dehydration  -Continue IV fluids    Acute on chronic kidney disease stage III  -Likely prerenal from low oral intake  -Improved with IV fluids    Elevated troponins  -Echo was ordered, await results     Constipation  -CT shows air-filled small and large bowel loops without evidence of obstruction  -Continue laxatives     Prolonged QTC  -EKG shows any irregular rhythm with PVCs and   -Continue telemetry    CAD with stent in the LAD in 2009   -Stable     Advanced dementia   -Neurology following     BPH  - Hx of  Prostate cancer   -Enlarged prostate noted on CT  -Difficult straight cath in the ED  -Bladder scan and [place] catheter as needed  -Prostate meds when able to take orally    DVT Prophylaxis: Lovenox  Diet: Diet NPO Effective Now  Code Status: Full Code    PT/OT Eval Status: Consulted    Dispo -await improvement in mentation    Khadar Ignacio MD

## 2020-04-11 NOTE — CONSULTS
Neurology Consult Note  Reason for Consult: encephalopathy  Chief complaint:confusion  Dr Melania Wilson MD asked me to see Areli Dumont in consultation for evaluation of encephalopathy    History of Present Illness:  Areli Dumont is a 75 y.o. male who presents with altered mental status for about 6 days prior to admission. He had recently been started on depakote for agitation associated with his advanced dementia. In ED he had elevated sodium and creatinine. He has been receiving hydration and by reports is doing better. Medical History:  Past Medical History:   Diagnosis Date    Anxiety     BPH     CAD (coronary artery disease)     Cancer ([facility] [address]     Depression     Hearing loss     Hyperlipidemia     Hypertension     Sinusitis     Vertigo      Past Surgical History:   Procedure Laterality Date    ANGIOPLASTY  2009     Medications Prior to Admission: LORazepam (ATIVAN) 0.5 MG tablet, TAKE 1 TABLET BY MOUTH EVERY 8 HOURS AS NEEDED FOR ANXIETY  escitalopram (LEXAPRO) 20 MG tablet, Take 1 tablet by mouth daily  losartan (COZAAR) 100 MG tablet, TAKE 1 TABLET BY MOUTH DAILY  QUEtiapine (SEROQUEL) 25 MG tablet, Take 1 tablet by mouth nightly  ezetimibe-simvastatin (VYTORIN) 10-20 MG per tablet, TAKE 1 TABLET BY MOUTH EVERY NIGHT  memantine (NAMENDA) 10 MG tablet, Take 10 mg by mouth 2 times daily  clopidogrel (PLAVIX) 75 MG tablet, TAKE 1 TABLET BY MOUTH DAILY  clopidogrel (PLAVIX) 75 MG tablet, Take 1 tablet by mouth daily  Probiotic Product (PROBIOTIC-10) CAPS, Take 1 capsule by mouth daily  vitamin B-12 (CYANOCOBALAMIN) 100 MCG tablet, Take 100 mcg by mouth daily  Donepezil HCl (ARICEPT PO), Take by mouth  Multiple Vitamins-Minerals (CENTRUM SILVER ULTRA MENS) TABS, Take 1 tablet by mouth daily. omega-3 acid ethyl esters (LOVAZA) 1 GM capsule, Take 1 g by mouth daily.   No Known Allergies  Family History   Problem Relation Age of Onset    High Blood Pressure Mother     High Cholesterol Mother touch in all 4 extremities. Cerebellar/coordination:limited exam given encephalopathy  Tone: increased in all 4 extremities a bit more spastic  Gait: limited exam given encephalopathy     He has bilateral grasp reflex  Prominent myoclonus in upper limbs with posture    Labs  VPA-85  Ammonia 51     Studies  Head CT with marked atrophy    Impression:  Maya Stanton is a 75 y.o. male with known advanced dementia who presented with altered mental status following starting depakote with signs suggestive of dehydration. He appears to be improving which is reassuring. It is possible the medication in the setting of his advanced dementia precipitated the mental status change. Recommendations:  -Check CK  Given tone and myoclonus, but otherwise continue observation for now  -Agree with stopping VPA for now. May re-challenge at a lower dose in the future but for the time being if needed could bump up seroquel.  -If not improving over the next few days (likely to take him longer given baseline disease) could widen ddx with MRI, CSF, and EEG but I think for now monitoring is reasonable.     A copy of this note was provided for Dr Voncile Gills, MD Delene Cranker, MD  [phone]  Evenings, weekends, and off weeks please discuss neurologist on-call

## 2020-04-12 NOTE — PROGRESS NOTES
Pt alert, but nonverbal, no response to questions asked other than turning his head toward the staff when called by his name. Pt in bed resting. Special air mattress is ordered. Hawkins in place. This RN reviewed with hospitalist brandan Boateng about the need of IVF as pt was NPO, hawkins output 150ml for 7hrs, told to continue IVF. pt in bed resting w camera and bed alarm in place for pt safety.  Will continue to monitor

## 2020-04-13 NOTE — PROGRESS NOTES
Hospitalist Progress Note      PCP: Saravanan Hall MD    Date of Admission: 4/9/2020    Chief Complaint: Encephalopathy    Hospital Course: 75 y.o. male with a history of advanced dementia, hypertension, CAD status post stent in 2009, BPH, depression and anxiety who presented from home, with wife, due to mental status changes. Subjective:   overall doing about the same as yesterday. Mental status is slowly been getting better though. Able to answer a few more questions as compared to yesterday. Medications:  Reviewed    Infusion Medications     Scheduled Medications    sodium chloride flush  10 mL Intravenous 2 times per day    enoxaparin  40 mg Subcutaneous Daily     PRN Meds: sodium chloride flush, acetaminophen **OR** acetaminophen, bisacodyl, ondansetron **OR** ondansetron      Intake/Output Summary (Last 24 hours) at 4/13/2020 1310  Last data filed at 4/13/2020 1139  Gross per 24 hour   Intake 1070.56 ml   Output 1075 ml   Net -4.44 ml       Physical Exam Performed:    BP (!) 142/75   Pulse 58   Temp 97.9 °F (36.6 °C) (Axillary)   Resp 16   Ht 6' 2\" (1.88 m)   Wt 187 lb 9.8 oz (85.1 kg)   SpO2 96%   BMI 24.09 kg/m²     General appearance: No apparent distress, chronically ill-appearing, encephalopathic  HEENT: Pupils equal, round, and reactive to light. Conjunctivae/corneas clear. Neck: Supple, with full range of motion. No jugular venous distention. Trachea midline. Respiratory:  Normal respiratory effort. Clear to auscultation, bilaterally without Rales/Wheezes/Rhonchi. Cardiovascular: Regular rate and rhythm with normal S1/S2 without murmurs, rubs or gallops. Abdomen: Soft, non-tender, non-distended with normal bowel sounds. Musculoskeletal: No clubbing, cyanosis or edema bilaterally. Skin: Skin color, texture, turgor normal.  No rashes or lesions. Neurologic: Unable to assess as patient is not able to follow commands  Psychiatric: Awake, alert .    Capillary Refill: Brisk,< 3

## 2020-04-13 NOTE — PROGRESS NOTES
tolerate recommended diet without observed clinical signs of aspiration  4/12: Cleared by RN to see pt. Received pt alert, resting in bed, on room air. Inconsistent verbalization, limited command following. Repositioned pt upright, and analyzed tolerance thins via cup and puree. Pt with positive oral acceptance thins (4-5 sips), with positive swallow initiation, no overt s/s aspiration/penetration. Pt with limited acceptance puree. Recommend previous speech recs for full liquid diet (via cup/spoon, no straws). Cont goal.   4/13: pt has remained NPO, recommendations were full liquids from prior eval and f/u. At this time, pt alert, stated name when asked and verbalized intermittently. Pt presented with water via cup/straw, puree and solid texture. Pt does not exhibit coughing or throat clearing, voice remained clear. Pt able to draw liquid up via straw this date. Initiation of swallow appears delayed upon visualization of anterior neck, though good movement visualized. When presented with soft solid, pt did not remove from utensil or this SLP hand. However if item placed in oral cavity, pt then demonstrated effective mastication with no oral residue noted. Recommend dysphagia II minced and moist with thin liquids with close monitoring for s/s of aspiration. Pt will require feeding from staff. Cont goal    2- The patient Lj Rosas will verbalize/demonstrate understanding of dysphagia recommendations  4/10:Educated pt to purpose of visit, s/s of aspiration, concern if aspiration occurs, rationale for diet recommendation/strategies to reduce risk for aspiration and instruction to notify staff if any signs emerge. Pt does not indicate comprehension   Cont goal   4/12: Educated pt re: role of SLP, purpose of visit, swallow function, recommendation. Cont goal.  4/13: pt educated to recommendation for diet and rationale.  Pt does not indicate comprehension  Cont goal    Patient/Family/Caregiver Education:  See

## 2020-04-13 NOTE — PROGRESS NOTES
Range 4/9/2020 16:16   Nitrite, Urine Latest Ref Range: Negative  Negative   Leukocyte Esterase, Urine Latest Ref Range: Negative  Negative       Studies:  Head CT with marked atrophy    Impression:  1. Encephalopathy  2. Dementia  3. Dehydration       Shelby Leach is a 75 y.o. male with known advanced dementia who presented with altered mental status following starting depakote with signs suggestive of dehydration. It is possible the medication in the setting of his advanced dementia precipitated the mental status change. His exam is slightly improved which is reassuring. Recommendations:  -Check CK ( ordered)  given tone and myoclonus, but otherwise continue observation for now. If CK elevated, would continue to trend daily  -Agree with stopping VPA for now. May re-challenge at a lower dose in the future but for the time being if needed could bump up seroquel  -Will continue to monitor for now.  If he does not improve over the next 24-48 hours (likely to take him longer given baseline disease) , then could widen ddx with MRI, CSF, and EEG     A copy of this note was provided for Dr Tanya Martinez MD     [address] Neurology  [phone]  Evenings, weekends, and off weeks please discuss with the covering neurologist.

## 2020-04-13 NOTE — PROGRESS NOTES
Per report, patient removed IV overnight. Now resting comfortably in bed. Speech evaluated patient and recommend dysphagia 2 diet. [address] [facility] [address] Mcclure Fitting to make aware. Wife called and given update.

## 2020-04-13 NOTE — PLAN OF CARE
Pt on RA resp easy. Rested quietly for most of shift. Cooperative with vital signs, whlich was stable and afebrile. No agitation noted. Will continue to monitor. Bed alarm and [employer] camera in place.

## 2020-04-14 NOTE — PROGRESS NOTES
Speech Language Pathology  Contact Note    Chart reviewed, d/w NANCY Stuart. Reported that pt has been consuming 25-50% of meals w/ some bolus holding observed; no s/s of aspiration. Upon arrival, pt easily roused. Mouthed \"no\" when requested if pt would like PO. No further response to commands or questions. Will attempt to see pt when able to participate in tx and available.     Thank you,    Dagoberto Goldsmith) [place], [place], Desmond Anderson; [phone]  Speech-Language Pathologist  Pager #: [phone], 1:45pm

## 2020-04-14 NOTE — PLAN OF CARE
Problem: Falls - Risk of:  Goal: Will remain free from falls  Description: Will remain free from falls  Outcome: Ongoing  Note: Pt is free from falls at this time. Bed is in lowest position, wheels are locked and bed alarm is set. Call light and belongings are within reach. Visual fall sign/blanket are posted. Will continue to monitor. Problem: Falls - Risk of:  Goal: Absence of physical injury  Description: Absence of physical injury  Outcome: Ongoing  Note: Pt is free from accidental physical injury at this time. Pt is AxOx4. Fall Risk assessed per shift. ID band in place. Bed in lowest position, wheels locked and alarm is set. Call light and belongings are within reach. Maintaining a safe environment. Will continue to assess and monitor. Problem: Confusion - Acute:  Goal: Mental status will be restored to baseline  Description: Mental status will be restored to baseline  Outcome: Ongoing  Note: Mental status improving this shift. Pt is now more alert and less aggressive. Pt able to respond to needs with verbal commands. Will continue to assess. Problem: Injury - Risk of, Physical Injury:  Goal: Will remain free from falls  Description: Will remain free from falls  Outcome: Ongoing  Note: Pt is free from falls at this time. Bed is in lowest position, wheels are locked and bed alarm is set. Call light and belongings are within reach. Visual fall sign/blanket are posted. Will continue to monitor. Problem: Injury - Risk of, Physical Injury:  Goal: Absence of physical injury  Description: Absence of physical injury  Outcome: Ongoing  Note: Pt is free from accidental physical injury at this time. Pt is AxOx4. Fall Risk assessed per shift. ID band in place. Bed in lowest position, wheels locked and alarm is set. Call light and belongings are within reach. Maintaining a safe environment. Will continue to assess and monitor.        Problem: Sleep Pattern Disturbance:  Goal: Appears

## 2020-04-14 NOTE — PROGRESS NOTES
Physical Therapy    Facility/Department: [address]  Re-evaluation and treatment / discharge    NAME: Carlos Cordero  : 1945  MRN: [id number]    Date of Service: 2020    Discharge Recommendations: Carlos Cordero scored a 6/24 on the AM-PAC short mobility form. Current research shows that an AM-PAC score of 17 or less is typically not associated with a discharge to the patient's home setting. Based on the patients AM-PAC score and their current functional mobility deficits, it is recommended that the patient have 3-5 sessions per week of Physical Therapy at d/c to increase the patients independence. SEE ASSESSMENT BELOW    If patient discharges prior to next session this note will serve as a discharge summary. Please see below for the latest assessment towards goals. PT Equipment Recommendations  Equipment Needed: No    Assessment   Assessment: Pt demos poor participation. Pt unable to follow commands. Non-verbal throughout majority of session. Pt appears distracted, looking out [place], not making eye contact with therapists. Spoke with pts wife before and after mobility to obtain baseline function and discuss d/c plans. Pt is below his functional baseline of being ambulatory with assist from family. Mobility significantly limited by cognition. Pt may do better when home in his usual environment with usual caregiver. May benefit from home PT for caregiver training/education. Will d/c acute PT due to poor cognition which significantly limits his mobility and ability to participate. Decision Making: High Complexity  Patient Education: Role of PT. No learning noted due to cognition. REQUIRES PT FOLLOW UP: No       Patient Diagnosis(es): The primary encounter diagnosis was Dehydration. A diagnosis of Altered mental status, unspecified altered mental status type was also pertinent to this visit.      has a past medical history of Anxiety, BPH, CAD (coronary artery disease), Cancer ([facility] [address] Depression, Hearing loss, Hyperlipidemia, Hypertension, Sinusitis, and Vertigo. has a past surgical history that includes angioplasty (2009). Restrictions  Position Activity Restriction  Other position/activity restrictions: Up with assist, ambulate pt  Vision/Hearing  Vision: Within Functional Limits  Hearing: Within functional limits     Subjective  General  Chart Reviewed: Yes  Additional Pertinent Hx: Pt to ED 4/9 with change in mental status and cough. Pt admitted for acute metabolic encephalopahy. Head CT (-). Chest CT/abd (-). PMH:  HTN, CA, CAD, vertigo, [place], depression, BPH, anxiety, Lewy Body dementia  Diagnosis: Acute metabolic encephalopathy  Subjective  Subjective: Pt found supine in bed. Pt spoke a few words initially on entry but then nothing afterwards. No indications of pain or discomfort.   Pain Screening  Patient Currently in Pain: (unable to assess, pt nonverbal-no grimmacing)          Orientation  Orientation  Overall Orientation Status: Impaired(unable to state name or otherwise indicate answers to questions)  Social/Functional History  Social/Functional History  Lives With: Spouse  Type of Home: House  Home Layout: Able to Live on Main level with bedroom/bathroom  Home Access: Stairs to enter without rails(2 WILEY)  Bathroom Shower/Tub: Walk-in shower  Bathroom Toilet: Handicap height  Bathroom Equipment: Shower chair  Home Equipment: Hospital bed, Rolling walker, [employer]  ADL Assistance: Needs assistance(total assist for dressing)  Homemaking Assistance: (family performs)  Ambulation Assistance: Needs assistance(min A with rail + gait belt for negotiation for full flight of stairs; assist from family for amb - arm over family members shoulder)  Transfer Assistance: Needs assistance  Additional Comments: history obtained from wife via phone  Cognition        Objective          PROM RLE (degrees)  RLE PROM: WFL  PROM LLE (degrees)  LLE PROM:

## 2020-04-14 NOTE — CARE COORDINATION
Resources  Needed. DMW  Rusty Salazar. CM  To follow up in  AM   With wife. UPDATE:  [place]:  No beds  Available    CM spoke w/ Lucretia in admissions and they can  Accept the pt   When stable  , no pre cert  Needed ,  ( no Covid test required  )   SOFY Wilkerson at [address] [place] [address]       Phone: [phone]       Fax: [phone] and his family were provided with choice of provider; he and his family are in agreement with the discharge plan. Care Transition Patient:  Yes    Blossom Giraldo RN  [facility] [employer]  Case Management Department  Ph: [phone]
to discharge: Confusion and Cognitive deficit    Additional Case Management Notes: CM following for  D/C planing and  Needs  ,      called wife Kamari Peralta to discuss discharge planning , wife is primary caregiver for patient at home and HCPOA  he is a full code she does not wish to change at this time but open to discussing later pending patient condition. Patient has DME as noted above and states he has been non-ambulatory due to change in condition  just in the last week after a sudden decline. She has a Private Duty Aid come in 12 hours a week to assist with his care and is paid privately . She also recently reached out to [facility] who provided a nurse and therapist to see and concluded they would not be able to work with him in his compromised condition .  updated her regarding PTOT and SLPevaluation here as well. . Going forward pending medical work up she is open to bringing him home if she has  More support and is able to manage his care or hospice care  if no change  In condition /Mental status  and patient  Continues to decline. The Plan for Transition of Care is related to the following treatment goals of Acute metabolic encephalopathy [P71.08]  Acute metabolic encephalopathy [P15.27]    The Patient and/or patient representative Eustaquio Dakins and his family were provided with a choice of provider and agrees with the discharge plan Yes    Freedom of choice list was provided with basic dialogue that supports the patient's individualized plan of care/goals and shares the quality data associated with the providers. Yes      Care Transition patient:  Yes    Jes Mcwilliams RN  [facility]  Case Management Department  Ph: [phone]

## 2020-04-14 NOTE — PROGRESS NOTES
Restriction  Other position/activity restrictions: Up with assist, ambulate pt    Subjective   General  Chart Reviewed: Yes  Additional Pertinent Hx: Admit 4/9 with AMS / Dehydration     recent increased depakote 2/2 increassed aggressive behaviors at home     CT head- neg,   CT Chest /abd -neg             PMHX: HTN,HLD,CAD,Depression, BPH, Anxiety and Lewy Body Dementia   Family / Caregiver Present: No  Diagnosis: AMS / Dehydration   Subjective  Subjective: Pt in bed, initially said \"ok\", when asked how he was. The rest of eval session, pt non verbal and did not give eye contact.   Patient Currently in Pain: (unable to assess, pt nonverbal-no grimmacing)  Vital Signs  Patient Currently in Pain: (unable to assess, pt nonverbal-no grimmacing)  Social/Functional History  Social/Functional History  Lives With: Spouse  Type of Home: House  Home Layout: Able to Live on Main level with bedroom/bathroom  Home Access: Stairs to enter without rails(2 WILEY)  Bathroom Shower/Tub: Walk-in shower  Bathroom Toilet: Handicap height  Bathroom Equipment: Shower chair  Home Equipment: Hospital bed, Rolling walker, [address]  ADL Assistance: Needs assistance(total assist for dressing)  Homemaking Assistance: (family performs)  Ambulation Assistance: Needs assistance(min A with rail + gait belt for negotiation for full flight of stairs; assist from family for amb - arm over family members shoulder)  Transfer Assistance: Needs assistance  Additional Comments: history obtained from wife via phone       Objective        Orientation  Overall Orientation Status: (unable to assess, pt nonverbal, did not nod yes or no to questions)     Balance  Sitting Balance: (varied SBA to Ad, leans posteriorly)           Bed mobility  Supine to Sit: Dependent/Total(dep of 2)  Sit to Supine: Dependent/Total(dep of 2)  Scooting: (dep of 2 scooting to head of bed in supine)  Transfers  Sit to stand: (attempted 2x, without success, max A of 2)

## 2020-04-14 NOTE — PROGRESS NOTES
Neurology Progress Note  Seen for encephalopathy     Updates:  More verbal today  Following simple commands this morning      ROS:  Unable to assess given enceohalopathy    Exam:  Blood pressure (!) 151/78, pulse 50, temperature 96.8 °F (36 °C), temperature source Axillary, resp. rate 16, height 6' 2\" (1.88 m), weight 187 lb 9.8 oz (85.1 kg), SpO2 99 %. Constitutional                          Vital signs: BP, HR, and RR reviewed            General Alert, no distress, well-nourished  Eyes: Unable to visualize the fundi  Cardiovascular: pulses symmetric in all 4 extremities.  No peripheral edema. Psychiatric: limited exam given encephalopathy but not agitated and no signs of hallucinations  Neurologic   Mental status: Eyes open spontaneously   orientation Oriented to name, unable to tell me place, month, or year  General fund of knowledge Poor, unable to name current or preceding president              Memory Poor, unable to name 1st president  Attention Intact, attends well to exam, unable to read the clock or calculate coins  Language Fluent in conversation  Comprehension Following simple commands, unable to follow 2 step commands  CN2: Visual Fields intact to finger movements  CN 3,4,6:  Conjugate gaze, tracks fully to the left and right  CN5: facial sensation limited exam given encephalopathy  CN7:face with mild left weakness, no dysarthria  CN8: hearing grossly intact  CN9: Palate elevates symmetrically   CN11: limited exam given encephalopathy  CN12: Tongue midline on protrusion   Strength: 4/5 throughout  Sensory: reacts to light touch throughout  Cerebellar/coordination:unable to test, unable to follow commands  Tone: Mildly increased in all 4 extremities   Gait: limited exam given encephalopathy   Other: No abnormal movements      Labs:  VPA-85  Ammonia 51   BUN: 14  Creatinine: 1.2  LFTs ok  WBC: 3.6  CK: 442     Blood culture: NGTD     UA:    Ref.  Range 4/9/2020 16:16   Nitrite, Urine Latest Ref Range: Negative  Negative   Leukocyte Esterase, Urine Latest Ref Range: Negative  Negative         Studies:  Head CT with marked atrophy     Impression:  1. Encephalopathy  2. Dementia  3. Dehydration         Rebel Blackwell a 75 y. o. male with known advanced dementia who presented with altered mental status following starting depakote with signs suggestive of dehydration.       It is possible the medication in the setting of his advanced dementia precipitated the mental status change.     He is clinically improving.      Recommendations:  -CK elevated, would continue to trend daily  -Agree with stopping VPA .  May re-challenge at a lower dose in the future but for the time being if needed could bump up seroquel  - Given continued clinical improvement, will hold off on further neurologic testing at this time and will continue to monitor for now       A copy of this note was provided for MD Gloria Mckeon [address] Neurology  [phone]  Evenings, weekends, and off weeks please discuss with the covering neurologist.

## 2020-04-14 NOTE — PROGRESS NOTES
24.09 kg/m²     General appearance: No apparent distress, chronically ill-appearing, encephalopathic  HEENT: Pupils equal, round, and reactive to light. Conjunctivae/corneas clear. Neck: Supple, with full range of motion. No jugular venous distention. Trachea midline. Respiratory:  Normal respiratory effort. Clear to auscultation, bilaterally without Rales/Wheezes/Rhonchi. Cardiovascular: Regular rate and rhythm with normal S1/S2 without murmurs, rubs or gallops. Abdomen: Soft, non-tender, non-distended with normal bowel sounds. Musculoskeletal: No clubbing, cyanosis or edema bilaterally. Skin: Skin color, texture, turgor normal.  No rashes or lesions. Neurologic: Unable to assess as patient is not able to follow commands  Psychiatric: Awake, alert . Capillary Refill: Brisk,< 3 seconds   Peripheral Pulses: +2 palpable, equal bilaterally       Labs:   Recent Labs     04/12/20  0509   WBC 3.6*   HGB 12.4*   HCT 38.2*   *     Recent Labs     04/13/20  0905 04/14/20  0645    138   K 4.0 3.9    101   CO2 21 25   BUN 12 14   CREATININE 1.3 1.2   CALCIUM 8.8 9.0     No results for input(s): AST, ALT, BILIDIR, BILITOT, ALKPHOS in the last 72 hours. No results for input(s): INR in the last 72 hours. Recent Labs     04/13/20  0905 04/14/20  0645   CKTOTAL 442* 437*       Urinalysis:      Lab Results   Component Value Date    NITRU Negative 04/09/2020    WBCUA 0-2 04/09/2020    BACTERIA 1+ 04/09/2020    RBCUA 5-10 04/09/2020    BLOODU MODERATE 04/09/2020    SPECGRAV >=1.030 04/09/2020    GLUCOSEU Negative 04/09/2020       Radiology:  CT CHEST ABDOMEN PELVIS WO CONTRAST   Final Result      No cause for acute abdominal pain identified. Prostatomegaly. Air-filled small and large bowel loops without evidence of obstruction. XR Acute Abd Series Chest 1 VW   Final Result      CT Head WO Contrast   Final Result      Age-related changes without acute intracranial abnormality.

## 2020-04-15 NOTE — PROGRESS NOTES
without observed clinical signs of aspiration  4/12: Cleared by RN to see pt. Received pt alert, resting in bed, on room air. Inconsistent verbalization, limited command following. Repositioned pt upright, and analyzed tolerance thins via cup and puree. Pt with positive oral acceptance thins (4-5 sips), with positive swallow initiation, no overt s/s aspiration/penetration. Pt with limited acceptance puree. Recommend previous speech recs for full liquid diet (via cup/spoon, no straws). Cont goal.   4/13: pt has remained NPO, recommendations were full liquids from prior eval and f/u. At this time, pt alert, stated name when asked and verbalized intermittently. Pt presented with water via cup/straw, puree and solid texture. Pt does not exhibit coughing or throat clearing, voice remained clear. Pt able to draw liquid up via straw this date. Initiation of swallow appears delayed upon visualization of anterior neck, though good movement visualized. When presented with soft solid, pt did not remove from utensil or this SLP hand. However if item placed in oral cavity, pt then demonstrated effective mastication with no oral residue noted. Recommend dysphagia II minced and moist with thin liquids with close monitoring for s/s of aspiration. Pt will require feeding from staff. Cont goal  4/15: pt alert, no response to questions or commands presented. However when MD entered room, pt did verbalize minimally with him. Pt accepted PO trials consisting of applesauce, scrambled eggs, ground sausage and liquids. Pt demonstrated slow mastication with soft solids, however with time clears oral cavity. Cough noted on initial sip of juice, however no further cough with additional trials via cup and straw, this date.    Recommend cont dysphagia II /thin liquids with close monitoring for s/s of aspiration  Cont goal  2- The patient Geovany Ricks will verbalize/demonstrate understanding of dysphagia recommendations  4/10:Educated pt to purpose of visit, s/s of aspiration, concern if aspiration occurs, rationale for diet recommendation/strategies to reduce risk for aspiration and instruction to notify staff if any signs emerge. Pt does not indicate comprehension   Cont goal   4/12: Educated pt re: role of SLP, purpose of visit, swallow function, recommendation. Cont goal.  4/13: pt educated to recommendation for diet and rationale. Pt does not indicate comprehension  Cont goal  4/15: pt educated to purpose of visit, question comprehension  Cont goal as appropriate    Patient/Family/Caregiver Education:  As above    Compensatory Strategies: pt will require staff to feed  Upright as possible for all oral intake  Check for pocketing of food - make sure pt has swallowed and mouth is clear prior to next bite  Small bites/sips  Alternate solids and liquids    Plan:  Continued daily Dysphagia treatment with goals per  plan of care. Diet recommendations:  Dysphagia II minced and moist with thin liquids - if s/s of aspiration emerge, or there is respiratory decline,  make NPO until further evaluated by SLP  DC recommendation: pt would benefit from follow up upon dc   Treatment: 20 min  D/W nursingAliyah  Needs met prior to leaving room, call button in reach. ALDO Barbosa.S./[facility]-SLP #[id number]  Pg.  # [phone]  If patient is discharged prior to next treatment, this note will serve as the discharge summary

## 2020-04-15 NOTE — DISCHARGE INSTR - COC
Continuity of Care Form    Patient Name: Shelby Leach   :  1945  MRN:  [id number]    Admit date:  2020  Discharge date:  04/15/2020    Code Status Order: Full Code   Advance Directives:   [facility] Documentation     Date/Time Healthcare Directive Type of Healthcare Directive Copy in [facility] [address] Agent's Name Healthcare Agent's Phone Number    [phone]  Yes, patient has an advance directive for healthcare treatment  --  --  --  --  --          Admitting Physician:  No admitting provider for patient encounter.   PCP: Racquel Finnegan MD    Discharging Nurse: [facility] Care Unit/Room#: 7078/4131-00  Discharging Unit Phone Number: ***    Emergency Contact:   Extended Emergency Contact Information  Primary Emergency Contact: Heidy Smith  Address: [address] [place] [address] Rina Delbert Moritz [address] [place] [address] Phone: [phone]  Work Phone: [phone]  Relation: Spouse    Past Surgical History:  Past Surgical History:   Procedure Laterality Date    2009       Immunization History:   Immunization History   Administered Date(s) Administered    Influenza Virus Vaccine 10/09/2014    Influenza, Intradermal, Preservative free 2013    Pneumococcal Conjugate 13-valent ([id number]) 2017    Pneumococcal Polysaccharide (Eighzgoxr35) 2013    Tdap (Boostrix, Adacel) 2018    Zoster Live (Zostavax) 2014    Zoster Recombinant (Shingrix) 2019, 2019       Active Problems:  Patient Active Problem List   Diagnosis Code    Lipidemia E78.5    CAD (coronary artery disease) I25.10    HTN (hypertension) I10    Elevated PSA R97.20    Prostate cancer ([facility] [address] C61    Vertigo R42    Neck pain M54.2    Venous stasis I87.8    Ischemic vascular dementia ([facility] [address] F01.50    Acute metabolic encephalopathy Q01.42       Isolation/Infection:   Isolation          No Isolation        Patient Electronically signed by Maria Guadalupe Robles RN on 4/15/20 at 11:09 AM EDT    CASE MANAGEMENT/SOCIAL WORK SECTION    Inpatient Status Date: 04/09/2020    Readmission Risk Assessment Score:  Readmission Risk              Risk of Unplanned Readmission:        21           Discharging to Facility/ Agency :  [address] [place] [address]       Phone: [phone]       Fax: [phone]        Family hiring  Private  Duty  Home health aids  inaddition . Dialysis Facility (if applicable)   NA   · Name:  · Address:  · Dialysis Schedule:  · Phone:  · Fax:    / signature: Electronically signed by Sanjay Eldridge RN on 4/15/20 at 11:14 AM EDT    PHYSICIAN SECTION    Prognosis: Fair    Condition at Discharge: Stable    Rehab Potential (if transferring to Rehab):N/A     Recommended Labs or Other Treatments After Discharge:   - Delirium precautions  - SLP follow up, he is on Dysphagia minced and moist diet on dc  - PT/OT closely follow up    Physician Certification: I certify the above information and transfer of Veena Pina  is necessary for the continuing treatment of the diagnosis listed and that he requires [address] for less 30 days.      Update Admission H&P: No change in H&P    PHYSICIAN SIGNATURE:  Electronically signed by Ivan Humphreys MD on 4/15/20 at 10:09 AM EDT

## 2020-04-15 NOTE — PROGRESS NOTES
Aparna Mckeon, wife called with update informed of discharge time of 5pm reviewed dc paperwork. Aparna Mckeon has questions in regards to seroquel. Dr. Betty Hammond sent a perfect serve. Will call wife with updates as needed.

## 2020-04-15 NOTE — PROGRESS NOTES
Discharge order received. Patient informed of discharge order. Discharge instructions reviewed with wife Omero Rome over the phone. Copy of discharge instructions given to patient. Signed prescriptions sent electronically to [facility] All patient belongings packed and sent with patient upon discharge. Patient left via [facility] care and transported home.

## 2020-04-20 NOTE — TELEPHONE ENCOUNTER
Charlene Clement from [facility] called stating patient had a fall on Friday 4/17/20 with no injury

## 2020-04-27 PROBLEM — E87.0 HYPERNATREMIA: Status: ACTIVE | Noted: 2020-01-01

## 2020-04-27 NOTE — PROGRESS NOTES
Report taken from JUDE ED RN. Patient arrived to 5 tower and is resting comfortably. Pt is A& disoriented. Patient is also non verbal. VSS. Tolerating NPO diet well. Pt has not gotten up yet and is too confused to try. He can also be combative at times. . Voiding without difficulty, but he is so dehydrated he hasn't voided yet. No complaints of pain this shift. No needs at this time. Will continue to monitor.

## 2020-04-27 NOTE — PROGRESS NOTES
Patient arrived to unit placed in isolation. Urine specimen sent. SMOG enema given. Placed on heart monitor. Labs drawn.

## 2020-04-27 NOTE — CARE COORDINATION
card and patient face sheet must be sent along with the prescription(s)  4. Cost of Rx cannot be added to hospital bill. If financial assistance is needed, please contact unit  or ;  or  CANNOT provide pharmacy voucher for patients co-pays  5. Patients can then  the prescription on their way out of the hospital at discharge, or pharmacy can deliver to the bedside if staff is available. (payment due at time of pick-up or delivery - cash, check, or card accepted)     Able to afford home medications/ co-pay costs: Yes    ADLS  Support Systems:      PT AM-PAC:   /24  OT AM-PAC:   /24    [place]: home with wife  Steps:     Plans to RETURN to current housing: Yes  Barriers to RETURNING to current housing: medical complications    Home Care Information  Currently ACTIVE with [address]: Yes  [address]: Meg Mcrae  Phone: [phone]  Fax: [phone]    Currently ACTIVE with [place] [facility]: No      Durable Medical Equipment  DME Provider:   Equipment: wheelchair, walker, bath bench and hospital bed    Home Oxygen and Respiratory Equipment  Has HOME OXYGEN prior to admission: No  [address]: Not Applicable    Dialysis  Active with HD/PD prior to admission: No  Nephrologist:     HD Center:  Not Applicable    DISCHARGE PLAN:  Disposition: Home with [address]: [facility] for discharge: EMS transportation     Factors facilitating achievement of predicted outcomes: Family support, Caregiver support, Cooperative and Pleasant    Barriers to discharge: Medical complications    Additional Case Management Notes: Referred to patient for d/c planning. Spoke to wife via phone. Patient is a 75year old male admitted for AMS. Patient usually lives at home with wife. Patient has private duty caregivers. Patient is current with Meg [facility].   Per wife, plan is for patient to return home on d/c.  No other needs noted at this time. The Plan for Transition of Care is related to the following treatment goals of Hypernatremia [E87.0]  Hypernatremia [E87.0]    The Patient and/or patient representative Carlota Ty and his family were provided with a choice of provider and agrees with the discharge plan Not Indicated    Freedom of choice list was provided with basic dialogue that supports the patient's individualized plan of care/goals and shares the quality data associated with the providers. Not Indicated    Care Transition patient:  Yes    Nuno Weems, MSW, LISW-S  [facility]  Case Management Department  Ph: [phone]

## 2020-04-27 NOTE — ED PROVIDER NOTES
1 [facility]  EMERGENCY DEPARTMENT ENCOUNTER          ATTENDING PHYSICIAN NOTE       Date of evaluation: 4/27/2020    Chief Complaint     Altered Mental Status      History of Present Illness     Charles Escalona is a 75 y.o. male with a PMH as described below who presents to the ED today with a chief complaint of: Altered mental status. The patient was recently admitted and discharged from this facility for what sounds to be a very similar presentation. At that time he was found to be encephalopathic likely secondary to dehydration, hypernatremia, and possibly secondary to Depakote. He was given IV fluids, his acute kidney injury improved and Depakote was held with improvement in his mental status. History is extremely limited as the patient presents by EMS. Reportedly family told EMS that he has had a slow steady decline with decreased activity, responsiveness, and more fatigue. For me, he is nonverbal but appears to regard to verbal and painful stimulation. Based on chart review he appears to be full code and was discharged with home health services      Review of Systems     Unable to perform review of systems given the patient's nonverbal status    Past Medical, Surgical, Family, and Social History     He has a past medical history of Anxiety, BPH, CAD (coronary artery disease), Cancer ([facility] Depression, Hearing loss, Hyperlipidemia, Hypertension, Sinusitis, and Vertigo. He has a past surgical history that includes angioplasty (2009). His family history includes Diabetes in his father and paternal grandmother; Heart Disease in his father; High Blood Pressure in his mother; High Cholesterol in his mother; Stroke in his father, maternal grandmother, and paternal grandmother. He reports that he has never smoked. He has never used smokeless tobacco. He reports that he does not drink alcohol or use drugs.     Medications     Previous Medications    BISACODYL (DULCOLAX) 10 MG SUPPOSITORY    Place 1 suppository rectally daily as needed for Constipation    CLOPIDOGREL (PLAVIX) 75 MG TABLET    TAKE 1 TABLET BY MOUTH DAILY    ESCITALOPRAM (LEXAPRO) 20 MG TABLET    Take 1 tablet by mouth daily    EZETIMIBE-SIMVASTATIN (VYTORIN) 10-20 MG PER TABLET    TAKE 1 TABLET BY MOUTH EVERY NIGHT    LORAZEPAM (ATIVAN) 0.5 MG TABLET    TAKE 1 TABLET BY MOUTH EVERY 8 HOURS AS NEEDED FOR ANXIETY    LOSARTAN (COZAAR) 100 MG TABLET    TAKE 1 TABLET BY MOUTH DAILY    MEMANTINE (NAMENDA) 10 MG TABLET    Take 10 mg by mouth 2 times daily    MULTIPLE VITAMINS-MINERALS (CENTRUM SILVER ULTRA MENS) TABS    Take 1 tablet by mouth daily. OMEGA-3 ACID ETHYL ESTERS (LOVAZA) 1 GM CAPSULE    Take 1 g by mouth daily. ONDANSETRON (ZOFRAN-ODT) 4 MG DISINTEGRATING TABLET    Take 1 tablet by mouth every 8 hours as needed for Nausea or Vomiting    PROBIOTIC PRODUCT (PROBIOTIC-10) CAPS    Take 1 capsule by mouth daily    QUETIAPINE (SEROQUEL) 25 MG TABLET    Take 1 tablet by mouth nightly    VITAMIN B-12 (CYANOCOBALAMIN) 100 MCG TABLET    Take 100 mcg by mouth daily       Allergies     He has No Known Allergies. Physical Exam     INITIAL VITALS: BP: (!) 140/90, Temp: 97.9 °F (36.6 °C), Pulse: 74, Resp: 16, SpO2: 97 %   Physical Exam  Vitals signs and nursing note reviewed. Constitutional:       Appearance: Normal appearance. HENT:      Head: Normocephalic. Nose: Nose normal.      Mouth/Throat:      Mouth: Mucous membranes are moist.   Eyes:      Extraocular Movements: Extraocular movements intact. Pupils: Pupils are equal, round, and reactive to light. Neck:      Musculoskeletal: Normal range of motion and neck supple. Cardiovascular:      Rate and Rhythm: Normal rate and regular rhythm. Pulmonary:      Effort: Pulmonary effort is normal.   Abdominal:      General: Bowel sounds are normal.      Tenderness: There is no abdominal tenderness. Musculoskeletal:      Right lower leg: No edema.       Left lower leg: No edema. Comments: Patient is seen to move his upper extremities spontaneously but does not move them to command. Some degree of rigidity with passive range of motion   Skin:     General: Skin is warm. Comments: Warm skin to touch   Neurological:      Mental Status: He is alert. Comments: Patient is nonverbal but appears to regard to verbal and painful stimulation. He responds to painful stimulation in all 4 extremities. No clonus. DiagnosticResults     EKG   Indication: Altered mental status: Ventricular rate 71, VA interval approximately 120. QRS 72, QTc 567, axis 30 degrees. Significant artifact although appears to be sinus rhythm with decreased frequency of PVCs compared to prior EKG. No acute ST-T wave elevations or depressions concerning for acute ischemia or infarct. Again difficult to interpret but probable prolonged QT interval    RADIOLOGY:  XR CHEST PORTABLE   Final Result      No acute cardiopulmonary disease      CT ABDOMEN PELVIS WO CONTRAST Additional Contrast? None   Final Result   1. No acute abnormality of the abdomen or pelvis. 2. Elevation of the left hemidiaphragm, unchanged from prior studies. 3. Multiple gas opacified loops of large and small bowel are again noted. No obstruction. Transverse colon is interposed between the stomach and anterior abdominal wall. CT HEAD WO CONTRAST   Final Result      1. NO ACUTE INTRACRANIAL ABNORMALITY.              LABS:   Results for orders placed or performed during the hospital encounter of 04/27/20   CBC auto differential   Result Value Ref Range    WBC 6.7 4.0 - 11.0 K/uL    RBC 4.79 4.20 - 5.90 M/uL    Hemoglobin 14.4 13.5 - 17.5 g/dL    Hematocrit 44.0 40.5 - 52.5 %    MCV 91.8 80.0 - 100.0 fL    MCH 30.1 26.0 - 34.0 pg    MCHC 32.7 31.0 - 36.0 g/dL    RDW 15.4 12.4 - 15.4 %    Platelets 694 011 - 449 K/uL    MPV 9.1 5.0 - 10.5 fL    Neutrophils % 72.9 %    Lymphocytes % 19.3 %    Monocytes % 6.7 %    Eosinophils % 0.7 %    Basophils % 0.4 %    Neutrophils Absolute 4.9 1.7 - 7.7 K/uL    Lymphocytes Absolute 1.3 1.0 - 5.1 K/uL    Monocytes Absolute 0.5 0.0 - 1.3 K/uL    Eosinophils Absolute 0.0 0.0 - 0.6 K/uL    Basophils Absolute 0.0 0.0 - 0.2 K/uL   Basic Metabolic Panel   Result Value Ref Range    Sodium 151 (H) 136 - 145 mmol/L    Potassium 4.5 3.5 - 5.1 mmol/L    Chloride 110 99 - 110 mmol/L    CO2 26 21 - 32 mmol/L    Anion Gap 15 3 - 16    Glucose 130 (H) 70 - 99 mg/dL    BUN 25 (H) 7 - 20 mg/dL    CREATININE 1.9 (H) 0.8 - 1.3 mg/dL    GFR Non-African American 35 (A) >60    GFR  42 (A) >60    Calcium 9.6 8.3 - 10.6 mg/dL   Troponin   Result Value Ref Range    Troponin 0.04 (H) <0.01 ng/mL   Ammonia   Result Value Ref Range    Ammonia 17 16 - 60 umol/L   Hepatic function panel (LFTs)   Result Value Ref Range    Total Protein 7.1 6.4 - 8.2 g/dL    Alb 3.9 3.4 - 5.0 g/dL    Alkaline Phosphatase 66 40 - 129 U/L    ALT 70 (H) 10 - 40 U/L    AST 48 (H) 15 - 37 U/L    Total Bilirubin 0.6 0.0 - 1.0 mg/dL    Bilirubin, Direct <0.2 0.0 - 0.3 mg/dL    Bilirubin, Indirect see below 0.0 - 1.0 mg/dL   Depakote Level   Result Value Ref Range    Valproic Acid Lvl <2.8 (L) 50.0 - 100.0 ug/mL   EKG 12 Lead   Result Value Ref Range    Ventricular Rate 71 BPM    Atrial Rate 72 BPM    QRS Duration 72 ms    Q-T Interval 522 ms    QTc Calculation (Bazett) 567 ms    R Axis 38 degrees    T Axis 270 degrees    Diagnosis       EKG performed in ER and to be interpreted by ER physician. Confirmed by MD, ER ([id number]),  Aydin [facility] ([phone]) on 4/27/2020 1:55:45 PM       ED BEDSIDE ULTRASOUND:      RECENT VITALS:  BP: 132/87, Temp: 97.9 °F (36.6 °C),Pulse: 66, Resp: 13, SpO2: 97 %     Procedures         ED Course     Nursing Notes, Past Medical Hx, Past Surgical Hx, Social Hx, Allergies, and Family Hx werereviewed.     The patient was given thefollowing medications:  Orders Placed This Encounter   Medications    lactated ringers bolus    lactated ringers bolus    lactated ringers bolus    SMOG Enema       CONSULTS:  IP CONSULT TO HOSPITALIST  IP CONSULT TO GI  IP CONSULT TO GENERAL SURGERY    MEDICAL DECISION MAKING / ASSESSMENT / Karina Peters is a 75 y.o. male who presents with altered mental status. This is very similar to his prior admission. Laboratory studies reveal hyponatremia, mild acute kidney injury, turns for dehydration likely secondary to decreased p.o. intake given his significant dementia. He was scheduled for PEG placement and GI contacted me requesting CT scan of the abdomen pelvis that showed transverse colon in between the stomach and the anterior abdominal wall. GI requested that general surgery be involved and that the patient be admitted. He was given 2+ liters of IV fluids in the emergency department. He will be admitted for further management. .        Clinical Impression     1. Altered mental status, unspecified altered mental status type    2. Dehydration    3.  Hypernatremia        Disposition     PATIENT REFERRED TO:  Unique Gilbert MD  [id number]  [address]  [phone]            DISCHARGE MEDICATIONS:  New Prescriptions    No medications on file       Vicente Ceja MD  04/27/20 7391

## 2020-04-27 NOTE — PROGRESS NOTES
4 Eyes Admission Assessment     I agree as the admission nurse that 2 RN's have performed a thorough Head to Toe Skin Assessment on the patient. ALL assessment sites listed below have been assessed on admission. Areas assessed by both nurses: Yes  [x]   Head, Face, and Ears   [x]   Shoulders, Back, and Chest  [x]   Arms, Elbows, and Hands   [x]   Coccyx, Sacrum, and Ischum  [x]   Legs, Feet, and Heels        Does the Patient have Skin Breakdown? Blister left heel.   Stage 2 coccyx        Ghulam Prevention initiated:  Yes   Wound Care Orders initiated:  Yes      [address]  nurse consulted for Pressure Injury (Stage 3,4, Unstageable, DTI, NWPT, and Complex wounds):  Yes      Nurse 1 eSignature: Electronically signed by Floresita Bell RN on 4/27/20 at 6:22 PM EDT    **SHARE this note so that the co-signing nurse is able to place an eSignature**    Nurse 2 eSignature: Electronically signed by Melo Hein RN on 4/28/20 at 12:17 AM EDT

## 2020-04-27 NOTE — ED TRIAGE NOTES
Pt presents to ED with c/o anorexia and fatigue. Squad states family mentioned he had a fall a couple weeks ago and since then has been declining with mentation. Pt nonverbal at triage.

## 2020-04-27 NOTE — PROGRESS NOTES
4 Eyes Admission Assessment     I agree as the admission nurse that 2 RN's have performed a thorough Head to Toe Skin Assessment on the patient. ALL assessment sites listed below have been assessed on admission. Areas assessed by both nurses:   [x]   Head, Face, and Ears   [x]   Shoulders, Back, and Chest  [x]   Arms, Elbows, and Hands   [x]   Coccyx, Sacrum, and Ischum  [x]   Legs, Feet, and Heels        Does the Patient have Skin Breakdown?   No         Ghulam Prevention initiated:  No   Wound Care Orders initiated:  No      C nurse consulted for Pressure Injury (Stage 3,4, Unstageable, DTI, NWPT, and Complex wounds):  No      Nurse 1 eSignature: Electronically signed by Graciela Johnson RN on 4/27/20 at 4:14 PM EDT    **SHARE this note so that the co-signing nurse is able to place an eSignature**    Nurse 2 eSignature: Electronically signed by Adrian Anne RN on 4/27/20 at 4:16 PM EDT

## 2020-04-27 NOTE — PROGRESS NOTES
Advanced Care Planning Note. Purpose of Encounter: Advanced care planning in light of acute and chronic deteriorating medical conditions. Parties In Attendance: Patient Mario Becerra),  Leigh Burkett (POA), Pricilla Dhillon MD  (myself)    Decisional Capacity: No    Subjective: Patient/family understand in this voluntary conversation that Mario Becerra continues to deteriorate and discuss what inteventions and plans patient would want implemented in light of the following diagnoses:    Patient Active Problem List   Diagnosis Code    Lipidemia E78.5    CAD (coronary artery disease) I25.10    HTN (hypertension) I10    Elevated PSA R97.20    Prostate cancer ([facility] [address] C61    Vertigo R42    Neck pain M54.2    Venous stasis I87.8    Ischemic vascular dementia ([facility] [address] F01.50    Acute metabolic encephalopathy E85.28    Hypernatremia E87.0         Objective: Pt has been having chronic decline in functional status with increased dependence on others for ADLs  Increased frequency of Hospitalizations. Likelihood of further hospitalizations with likelihood of escalation of medical interventions with the expectation of returning to a diminishing baseline level of functionality. Discussion highlights: I discussed with patient the ramifications of their acute and chronic medical problems- and the likely outcomes expected, both in terms of statistics, and as part of my experience seeing patients with similar conditions. Goals of Care Determination:  Madyson Altamirano wishes patient to remain Full code for now, but has interest in continuing this conversation, and discussing with family before making any changes to code status and goals of care parameters. POA wishes that patient should be getting a PEG tube placement while hospitalization and after he gets discharge he will be transitioned to hospice.   POA has been in contact with hospice agency as an outpatient. Plan: Continue to educate patient on medical conditions and the choices available regarding possible outcomes with regard to goals of care and code status.          Time spent on Advanced care Plannin minutes    Brad Begum MD  2020 5:42 PM

## 2020-04-27 NOTE — CONSULTS
General Surgery   Resident Consult Note    Reason for Consult: PEG tube placement    History of Present Illness:   Rodolfo Jacinto is a 75 y.o. male with Hx of severe dementia, CAD sp angioplasty, HTN, HLD who was presented to the ED due to refusal to take PO intake with severe malnutrition and dehydration. Pt was non-verbal and history was obtained from wife, by phone  She states patient has not been drinking or eating since February. Pt has lost 40 lbs for the past 6 months. He admitted earlier this month due to altered mental status and was treated for dehydration as well. He then was discharged home and was seen by PCP on Monday and was recommended to have G-tube placed for hydration and nutrition. Wife stated they have discussed his goal of care with palliative care and plan to discharge patient home with feeding tube access. No history of abdominal surgery in the past. No history of aspiration, GERD, or reflux. Last EGD was a few years ago, however she does not remember the result. Patient was on plavix and aspirin for CAD, and it was discontinued for at least 2 weeks. During his last hospitalization, SLP evaluation recommended dysphagia II idet with thin liquids, no aspiration noted. Echo (4/9) was probably normal, but function was not reliably assessed on the study. Past Medical History:        Diagnosis Date    Anxiety     BPH     CAD (coronary artery disease)     Cancer ([facility] [address]     Depression     Hearing loss     Hyperlipidemia     Hypertension     Sinusitis     Vertigo        Past Surgical History:           Procedure Laterality Date    ANGIOPLASTY  2009   Prostate surgery     Allergies:  Patient has no known allergies. Medications:   Home Meds  No current facility-administered medications on file prior to encounter.       Current Outpatient Medications on File Prior to Encounter   Medication Sig Dispense Refill    bisacodyl (DULCOLAX) 10 MG suppository Place 1 suppository rectally daily as needed for Constipation 10 suppository 0    ondansetron (ZOFRAN-ODT) 4 MG disintegrating tablet Take 1 tablet by mouth every 8 hours as needed for Nausea or Vomiting 30 tablet 0    QUEtiapine (SEROQUEL) 25 MG tablet Take 1 tablet by mouth nightly 30 tablet 2    LORazepam (ATIVAN) 0.5 MG tablet TAKE 1 TABLET BY MOUTH EVERY 8 HOURS AS NEEDED FOR ANXIETY 100 tablet 2    clopidogrel (PLAVIX) 75 MG tablet TAKE 1 TABLET BY MOUTH DAILY 90 tablet 3    Probiotic Product (PROBIOTIC-10) CAPS Take 1 capsule by mouth daily      vitamin B-12 (CYANOCOBALAMIN) 100 MCG tablet Take 100 mcg by mouth daily      escitalopram (LEXAPRO) 20 MG tablet Take 1 tablet by mouth daily 90 tablet 5    losartan (COZAAR) 100 MG tablet TAKE 1 TABLET BY MOUTH DAILY 90 tablet 5    ezetimibe-simvastatin (VYTORIN) 10-20 MG per tablet TAKE 1 TABLET BY MOUTH EVERY NIGHT 90 tablet 0    memantine (NAMENDA) 10 MG tablet Take 10 mg by mouth 2 times daily      Multiple Vitamins-Minerals (CENTRUM SILVER ULTRA MENS) TABS Take 1 tablet by mouth daily.  omega-3 acid ethyl esters (LOVAZA) 1 GM capsule Take 1 g by mouth daily. Current Meds  lactated ringers bolus, Once  lactated ringers bolus, Once  SMOG Enema, Once        Family History:   Family History   Problem Relation Age of Onset    High Blood Pressure Mother     High Cholesterol Mother     Diabetes Father     Heart Disease Father     Stroke Father     Stroke Maternal Grandmother     Diabetes Paternal Grandmother     Stroke Paternal Grandmother        Social History:   TOBACCO:   reports that he has never smoked. He has never used smokeless tobacco.  ETOH:   reports no history of alcohol use. DRUGS:   reports no history of drug use. ROS: A 14 point review of systems was conducted, significant findings as noted in HPI. All other systems negative.     Physical exam:    Vitals:    04/27/20 1144 04/27/20 1157 04/27/20 1302 04/27/20 1427   BP: (!) 140/90 105/82 132/87 Jane Vides Liam 172/98   Pulse: 74 74 66 66   Resp: 16 12 13 13   Temp: 97.9 °F (36.6 °C)      TempSrc: Axillary      SpO2: 97%   100%       General appearance: alert, no acute distress, grooming appropriate  Eyes: PERRLA, no scleral icterus  Neck: trachea midline, no JVD, no lymphadenopathy  Chest/Lungs: normal effort  Cardiovascular: RRR  Abdomen: non-tender, non-distended, no scar or bulging noted  Skin: warm and dry, no rashes  Extremities: no edema, no cyanosis  Neuro: non-verbal, unable to assess    Labs:    CBC:   Recent Labs     04/27/20  1218   WBC 6.7   HGB 14.4   HCT 44.0   MCV 91.8        BMP:   Recent Labs     04/27/20  1218   *   K 4.5      CO2 26   BUN 25*   CREATININE 1.9*     PT/INR: No results for input(s): PROTIME, INR in the last 72 hours. APTT: No results for input(s): APTT in the last 72 hours. Liver Profile:  Lab Results   Component Value Date    AST 48 04/27/2020    ALT 70 04/27/2020    BILIDIR <0.2 04/27/2020    BILITOT 0.6 04/27/2020    ALKPHOS 66 04/27/2020     Lab Results   Component Value Date    CHOL 136 01/23/2020    HDL 63 01/23/2020    TRIG 50 01/23/2020     UA:   Lab Results   Component Value Date    COLORU Yellow 04/09/2020    PHUR 6.0 04/09/2020    WBCUA 0-2 04/09/2020    RBCUA 5-10 04/09/2020    MUCUS 1+ 04/09/2020    YEAST Present 03/17/2020    BACTERIA 1+ 04/09/2020    CLARITYU Clear 04/09/2020    SPECGRAV >=1.030 04/09/2020    LEUKOCYTESUR Negative 04/09/2020    UROBILINOGEN 0.2 04/09/2020    BILIRUBINUR Negative 04/09/2020    BLOODU MODERATE 04/09/2020    GLUCOSEU Negative 04/09/2020       Imaging:   XR CHEST PORTABLE   Final Result      No acute cardiopulmonary disease      CT ABDOMEN PELVIS WO CONTRAST Additional Contrast? None   Final Result   1. No acute abnormality of the abdomen or pelvis. 2. Elevation of the left hemidiaphragm, unchanged from prior studies. 3. Multiple gas opacified loops of large and small bowel are again noted. No obstruction. Transverse colon is interposed between the stomach and anterior abdominal wall. CT HEAD WO CONTRAST   Final Result      1. NO ACUTE INTRACRANIAL ABNORMALITY. Assessment/Plan: This is a 75 y.o. male with Hx of severe dementia with severe malnutrition, dehydration and hypernatremia, requiring feeding access. CT scan reviewed.  Patient will need laparoscopic assisted PEG tube placement to ensure safe G-tube insertion.     - Schedule for lap assisted PEG tube placement   - Diet/IVF: NPO after midnight, fluid management per primary team in the setting of hypernatremia  - CXR obtained  - EKG pending  - PT/INR  - Type and screen   - Antibiotic: ancef OCTOR  - Consent obtained by phone with wife  - Anesthesia to see patient  - Will need COVID-19 screening/rule out pre-op  - Medical clearance per primary team    Discussed with staff and hospitalist.      Rishabh Orourke MD  General Surgery Resident  04/27/20  2:39 PM  [phone]

## 2020-04-27 NOTE — ED NOTES
Pt going to 5524 report called to [facility] then pt to room via stretcher per Lucila Sandoval PCA in stable condition     Елена Saavedra RN  04/27/20 7726

## 2020-04-27 NOTE — PROGRESS NOTES
EKG held until resident communicates verifies need for test. Message requested @ [phone]. No response at this time.

## 2020-04-27 NOTE — H&P
Hospital Medicine History & Physical      PCP: Sunita Orourke MD    Date of Admission: 4/27/2020    Date of Service: Pt seen/examined on 4/27/2020 and Admitted to Inpatient with expected LOS greater than two midnights due to medical therapy. Chief Complaint: Encephalopathy      History Of Present Illness: This is a 29-year-old male with history of advanced dementia, hypertension, CAD status post stenting who presented from home due to mental status changes and unable to eat and drink. Patient was recently discharged from the hospital on 4/15. Patient is encephalopathic on my evaluation so most of the history was taken from ED and discussion with wife over the phone. Wife reported that patient has been progressively declining but for about a week or so he has a stop eating and drinking she also reported that patient has advanced dementia so he cannot keep himself hydrated. She reported that the talk to their PCP was recommending PEG tube placement. Wife denied any fever, chills, shortness of breath, nausea, vomiting at home. Discussed with wife regarding hospice due to advanced dementia and continues declining in patients health. Wife reported that she has been in contact with hospice agency and patient will be transitioned to hospice after discharge. Past Medical History:          Diagnosis Date    Anxiety     BPH     CAD (coronary artery disease)     Cancer ([facility] [address]     Depression     Hearing loss     Hyperlipidemia     Hypertension     Sinusitis     Vertigo        Past Surgical History:          Procedure Laterality Date    ANGIOPLASTY  2009       Medications Prior to Admission:      Prior to Admission medications    Medication Sig Start Date End Date Taking?  Authorizing Provider   bisacodyl (DULCOLAX) 10 MG suppository Place 1 suppository rectally daily as needed for Constipation 4/15/20 5/15/20  Black Mares MD   ondansetron (ZOFRAN-ODT) 4 MG disintegrating tablet Take 1 tablet by mouth every 8 hours as needed for Nausea or Vomiting 4/15/20   Dragan Michelle MD   QUEtiapine (SEROQUEL) 25 MG tablet Take 1 tablet by mouth nightly 4/15/20   Dragan Michelle MD   LORazepam (ATIVAN) 0.5 MG tablet TAKE 1 TABLET BY MOUTH EVERY 8 HOURS AS NEEDED FOR ANXIETY 3/24/20 3/24/21  ROZ Torre MD   clopidogrel (PLAVIX) 75 MG tablet TAKE 1 TABLET BY MOUTH DAILY 3/4/20   ROZ Torre MD   Probiotic Product (PROBIOTIC-10) CAPS Take 1 capsule by mouth daily    Historical Provider, MD   vitamin B-12 (CYANOCOBALAMIN) 100 MCG tablet Take 100 mcg by mouth daily    Historical Provider, MD   escitalopram (LEXAPRO) 20 MG tablet Take 1 tablet by mouth daily 9/24/19   ROZ Torre MD   losartan (COZAAR) 100 MG tablet TAKE 1 TABLET BY MOUTH DAILY 7/31/19   C Kelly Torre MD   ezetimibe-simvastatin (VYTORIN) 10-20 MG per tablet TAKE 1 TABLET BY MOUTH EVERY NIGHT 3/6/19   ROZ Torre MD   memantine (NAMENDA) 10 MG tablet Take 10 mg by mouth 2 times daily    Historical Provider, MD   Multiple Vitamins-Minerals (CENTRUM SILVER ULTRA MENS) TABS Take 1 tablet by mouth daily. Historical Provider, MD   omega-3 acid ethyl esters (LOVAZA) 1 GM capsule Take 1 g by mouth daily. 8/6/10   Historical Provider, MD       Allergies:  Patient has no known allergies. Social History:      The patient currently lives home with wife    TOBACCO:   reports that he has never smoked. He has never used smokeless tobacco.  ETOH:   reports no history of alcohol use. Family History:       Reviewed in detail and negative for DM, CAD, Cancer, CVA.  Positive as follows:        Problem Relation Age of Onset    High Blood Pressure Mother     High Cholesterol Mother     Diabetes Father     Heart Disease Father     Stroke Father     Stroke Maternal Grandmother     Diabetes Paternal Grandmother     Stroke Paternal Grandmother        REVIEW OF SYSTEMS:   Limited due to encephalopathy    PHYSICAL EXAM PERFORMED:    BP (!) 172/98   Pulse 66   Temp 97.9 °F (36.6 °C) (Axillary)   Resp 13   SpO2 100%     General appearance: Cachectic, appears to be not in distress  HEENT:  Normal cephalic, atraumatic without obvious deformity. Pupils equal, round, and reactive to light. Conjunctivae/corneas clear. Neck: Supple,. No jugular venous distention. Trachea midline. Respiratory:  Normal respiratory effort. Clear to auscultation, bilaterally without Rales/Wheezes/Rhonchi. Cardiovascular:  Regular rate and rhythm with normal S1/S2 without murmurs, rubs or gallops. Abdomen: Soft, non-tender, non-distended with normal bowel sounds. Musculoskeletal:  No clubbing, cyanosis or edema bilaterally. Skin: Skin color, texture,   No rashes or lesions. Skin turgor delayed   Neurologic: Limited due to clinical condition. He opens up his eyes but does not follow pending command. Psychiatric: Encephalopathy  Peripheral Pulses: +2 palpable, equal bilaterally       Labs:     Recent Labs     04/27/20  1218   WBC 6.7   HGB 14.4   HCT 44.0        Recent Labs     04/27/20  1218   *   K 4.5      CO2 26   BUN 25*   CREATININE 1.9*   CALCIUM 9.6     Recent Labs     04/27/20  1218   AST 48*   ALT 70*   BILIDIR <0.2   BILITOT 0.6   ALKPHOS 66     No results for input(s): INR in the last 72 hours. Recent Labs     04/27/20  1218   TROPONINI 0.04*       Urinalysis:      Lab Results   Component Value Date    NITRU Negative 04/09/2020    WBCUA 0-2 04/09/2020    BACTERIA 1+ 04/09/2020    RBCUA 5-10 04/09/2020    BLOODU MODERATE 04/09/2020    SPECGRAV >=1.030 04/09/2020    GLUCOSEU Negative 04/09/2020       Radiology:     CXR: I have reviewed the CXR with the following interpretation: No acute cardiopulmonary disease.   EKG:  I have reviewed the EKG with the following interpretation: NSR with prolonged QTc interval    XR CHEST PORTABLE   Final Result      No acute cardiopulmonary disease      CT ABDOMEN PELVIS WO CONTRAST Additional Contrast? None   Final Result   1. No acute abnormality of the abdomen or pelvis. 2. Elevation of the left hemidiaphragm, unchanged from prior studies. 3. Multiple gas opacified loops of large and small bowel are again noted. No obstruction. Transverse colon is interposed between the stomach and anterior abdominal wall. CT HEAD WO CONTRAST   Final Result      1. NO ACUTE INTRACRANIAL ABNORMALITY. ASSESSMENT:    Active Hospital Problems    Diagnosis Date Noted    Hypernatremia [E87.0] 04/27/2020     #Acute metabolic encephalopathy  Multifactorial hyponatremia, dehydration advanced dementia, NENA    #Hypernatremia clear due to dehydration  Received 2 L fluid volume resuscitation in ER  Will start on half-normal saline, D5W at 75 cc/h  Check electrolytes every 6 hours. Follow-up on sputum osmolarity, urine electrolytes and urine osmolarity. #NENA likely prerenal in setting of dehydration  UA with hyaline cast.  Continue IVF. #Elevated troponin likely type II NSTEMI  We will monitor. EKG showed no ischemic changes. #History of CAD  Echo 4/11/2020 borderline wall thickness, normal EF. #Prolonged QTc interval  Continue monitor telemetry. #Advanced dementia    #Dysphagia due to advanced dementia  Plan for PEG tube placement    #Preop clearance  RCRI 1. 6.0% risk of death, MI or cardiac arrest.   Patient is mostly bedbound. Unable to assess mets but he had a recent echo which is reassuring. Avoid medications which can prolong QTC interval  Patient is at moderate risk for undergoing surgery with general anesthesia. Keep sedation time minimum as possible. #severe protein energy malnutrition  COVID-19 sent prior to surgery  DVT Prophylaxis: Lovenox  Diet: No diet orders on file  Code Status: Prior    PT/OT Eval Status: NA    Dispo - inpatient       Brad Begum MD    Thank you C Solomon Shultz MD for the opportunity to be involved in this patient's care.  If you have any questions or concerns please feel free to contact me at [phone].

## 2020-04-28 NOTE — PROGRESS NOTES
BMP, CBC, and Type and screen labs sent to lab. OK with Dr. Summer Vazquez to send type and screen now instead of 0500 when ordered.      Electronically signed by Jhonny Valenzuela RN on 4/27/2020 at 9:21 PM

## 2020-04-28 NOTE — PROGRESS NOTES
Patient's wife Christiane Silverman updated. Would like to wait for a call in the morning once time of procedure is known, unless there is any change in care.      Electronically signed by Kasey Nova RN on 4/27/2020 at 10:08 PM

## 2020-04-28 NOTE — PLAN OF CARE
Problem: Falls - Risk of:  Goal: Will remain free from falls  Description: Will remain free from falls  4/28/2020 1340 by Aliza Wasserman RN  Outcome: Ongoing    Note:  Assessed as high fall risk, bed alarm on, door open and hourly visual checks. Patient has made no attempts to get out of bed. Will continue to monitor . Problem: Fluid Volume:  Goal: Ability to achieve a balanced intake and output will improve  Description: Ability to achieve a balanced intake and output will improve  4/28/2020 1340 by Aliza Wasserman RN  Outcome: Ongoing  Patient receiving IV fluids at 75 ml / hr.  Patient has a condom cath on . Patient is NPO for Peg tube placement today. Problem: Physical Regulation:  Goal: Will show no signs and symptoms of electrolyte imbalance  Description: Will show no signs and symptoms of electrolyte imbalance  Outcome: Ongoing  Electrolyte panel being drawn every 6 hours. Last sodium level was in the normal range at 144. Problem: Skin Integrity:  Goal: Will show no infection signs and symptoms  Description: Will show no infection signs and symptoms  4/28/2020 1340 by Aliza Wasserman RN  Outcome: Ongoing  4/28/2020 0247 by Christina Ventura RN  Outcome: Ongoing  Patient is on a specialty mattress with Prevalon boots in place. Turn patient every 2 hours with pillow support. Cream applied to open areas on coccyx . Continue to assess skin integrity.

## 2020-04-28 NOTE — PROGRESS NOTES
Patient admitted to PACU # 12 from OR at 1731 post [address] [facility], ESOPHAGOGASTRODUODENOSCOPY  per Dr. Ned Maynard. Attached to PACU monitoring system and report received from anesthesia provider. Patient was reported to be hemodynamically stable during procedure. Patient sedated on admission with oral airway in place and no signs of pain.

## 2020-04-28 NOTE — PROGRESS NOTES
Hospitalist Progress Note      PCP: Mars Acharya MD    Date of Admission: 4/27/2020    Chief Complaint: Encephalopathy    Hospital Course: 21-year-old male with a history of advanced dementia hypertension coronary artery disease status post stenting who presented with mental status changes and poor intake. Planning for PEG tube placement today    Subjective: Seen and examined  No new complaints  Creatinine 1.3 from 1.9    Medications:  Reviewed    Infusion Medications    dextrose 5% and 0.45% NaCl with KCl 20 mEq 75 mL/hr at 04/28/20 0630     Scheduled Medications    sodium chloride flush  10 mL Intravenous 2 times per day    enoxaparin  40 mg Subcutaneous Daily    ceFAZolin  2 g Intravenous On Call to OR     PRN Meds: sodium chloride flush, acetaminophen **OR** acetaminophen, polyethylene glycol, promethazine **OR** ondansetron, potassium chloride **OR** potassium alternative oral replacement **OR** potassium chloride, magnesium sulfate      Intake/Output Summary (Last 24 hours) at 4/28/2020 1313  Last data filed at 4/28/2020 0409  Gross per 24 hour   Intake 791 ml   Output 500 ml   Net 291 ml       Physical Exam Performed:    /75   Pulse 82   Temp 97 °F (36.1 °C) (Axillary)   Resp 16   Ht 6' 2\" (1.88 m)   Wt 173 lb 15.1 oz (78.9 kg)   SpO2 99%   BMI 22.33 kg/m²     General appearance: Cachectic, appears to be not in distress  HEENT:  Normal cephalic, atraumatic without obvious deformity. Pupils equal, round, and reactive to light. Conjunctivae/corneas clear. Neck: Supple,. No jugular venous distention. Trachea midline. Respiratory:  Normal respiratory effort. Clear to auscultation, bilaterally without Rales/Wheezes/Rhonchi. Cardiovascular:  Regular rate and rhythm with normal S1/S2 without murmurs, rubs or gallops. Abdomen: Soft, non-tender, non-distended with normal bowel sounds. Musculoskeletal:  No clubbing, cyanosis or edema bilaterally.     Skin: Skin color, texture,   No rashes or lesions. Skin turgor delayed   Neurologic: Limited due to clinical condition. He opens up his eyes but does not follow pending command. Psychiatric: Encephalopathy  Peripheral Pulses: +2 palpable, equal bilaterally     Labs:   Recent Labs     04/27/20  1218 04/27/20  2100 04/28/20  0430   WBC 6.7 5.6 4.6   HGB 14.4 13.2* 12.8*   HCT 44.0 40.0* 38.6*    163 148     Recent Labs     04/27/20  1218 04/27/20  2100 04/28/20  0430 04/28/20  1238   * 149* 148* 144   K 4.5 4.2 4.2 4.5    110 112* 111*   CO2 26 28 27 20*   BUN 25*  --  21*  --    CREATININE 1.9*  --  1.3  --    CALCIUM 9.6  --  8.9  --      Recent Labs     04/27/20  1218   AST 48*   ALT 70*   BILIDIR <0.2   BILITOT 0.6   ALKPHOS 66     Recent Labs     04/28/20  0430   INR 1.49*     Recent Labs     04/27/20  1218   TROPONINI 0.04*       Urinalysis:      Lab Results   Component Value Date    NITRU Negative 04/27/2020    WBCUA 3-5 04/27/2020    BACTERIA 1+ 04/27/2020    RBCUA 5-10 04/27/2020    BLOODU MODERATE 04/27/2020    SPECGRAV >=1.030 04/27/2020    GLUCOSEU Negative 04/27/2020       Radiology:  XR CHEST PORTABLE   Final Result      No acute cardiopulmonary disease      CT ABDOMEN PELVIS WO CONTRAST Additional Contrast? None   Final Result   1. No acute abnormality of the abdomen or pelvis. 2. Elevation of the left hemidiaphragm, unchanged from prior studies. 3. Multiple gas opacified loops of large and small bowel are again noted. No obstruction. Transverse colon is interposed between the stomach and anterior abdominal wall. CT HEAD WO CONTRAST   Final Result      1. NO ACUTE INTRACRANIAL ABNORMALITY.                  Assessment/Plan:    Active Hospital Problems    Diagnosis    Hypernatremia [E87.0]     #Acute metabolic encephalopathy  Multifactorial hyponatremia, dehydration advanced dementia, NENA     #Hypernatremia clear due to dehydration  Received 2 L fluid volume resuscitation in ER  Will start on

## 2020-04-28 NOTE — ANESTHESIA PRE PROCEDURE
Department of Anesthesiology  Preprocedure Note       Name:  Yasmin Wesley   Age:  75 y.o.  :  1945                                          MRN:  [id number]         Date:  2020      Surgeon: Yg Gatica): Mars Traylor DO    Procedure: LAPAROSCOPIC ASSISTED PERCUTANEOUS ENDOSCOPIC GASTROSTOMY (PEG) TUBE PLACEMENT (N/A )    Medications prior to admission:   Prior to Admission medications    Medication Sig Start Date End Date Taking? Authorizing Provider   QUEtiapine (SEROQUEL) 25 MG tablet Take 1 tablet by mouth nightly 4/15/20  Yes Lyric Grider MD   LORazepam (ATIVAN) 0.5 MG tablet TAKE 1 TABLET BY MOUTH EVERY 8 HOURS AS NEEDED FOR ANXIETY 3/24/20 3/24/21 Yes ROZ Ramesh MD   escitalopram (LEXAPRO) 20 MG tablet Take 1 tablet by mouth daily 19  Yes Miles Tristan MD   losartan (COZAAR) 100 MG tablet TAKE 1 TABLET BY MOUTH DAILY 19  Yes ROZ Ramesh MD   ezetimibe-simvastatin (VYTORIN) 10-20 MG per tablet TAKE 1 TABLET BY MOUTH EVERY NIGHT  Patient taking differently: On hold for 3 days for surgery 3/6/19  Yes ROZ Ramesh MD   memantine (NAMENDA) 10 MG tablet Take 10 mg by mouth 2 times daily No longer taking as of last week   Yes Historical Provider, MD   bisacodyl (DULCOLAX) 10 MG suppository Place 1 suppository rectally daily as needed for Constipation 4/15/20 5/15/20  Lyric Grider MD   ondansetron (ZOFRAN-ODT) 4 MG disintegrating tablet Take 1 tablet by mouth every 8 hours as needed for Nausea or Vomiting 4/15/20   Lyric Grider MD   clopidogrel (PLAVIX) 75 MG tablet TAKE 1 TABLET BY MOUTH DAILY 3/4/20   ROZ Ramesh MD   Probiotic Product (PROBIOTIC-10) CAPS Take 1 capsule by mouth daily    Historical Provider, MD   vitamin B-12 (CYANOCOBALAMIN) 100 MCG tablet Take 100 mcg by mouth daily    Historical Provider, MD   Multiple Vitamins-Minerals (CENTRUM SILVER ULTRA MENS) TABS Take 1 tablet by mouth daily.       Historical Provider, MD   omega-3 acid ethyl esters (LOVAZA) 1 GM capsule Take 1 g by mouth daily.  8/6/10   Historical Provider, MD       Current medications:    Current Facility-Administered Medications   Medication Dose Route Frequency Provider Last Rate Last Dose    sodium chloride flush 0.9 % injection 10 mL  10 mL Intravenous 2 times per day Keke Mireles MD        sodium chloride flush 0.9 % injection 10 mL  10 mL Intravenous PRN Keke Mireles MD        acetaminophen (TYLENOL) tablet 650 mg  650 mg Oral Q6H PRN Keke Mireles MD        Or   [facility] acetaminophen (TYLENOL) suppository 650 mg  650 mg Rectal Q6H PRN Keke Mireles MD        polyethylene glycol (GLYCOLAX) packet 17 g  17 g Oral Daily PRN Keke Mireles MD        promethazine (PHENERGAN) tablet 12.5 mg  12.5 mg Oral Q6H PRN Keke Mireles MD        Or    [facility]ron [facility]) injection 4 mg  4 mg Intravenous Q6H PRN Keke Mireles MD        enoxaparin (LOVENOX) injection 40 mg  40 mg Subcutaneous Daily Keke Mireles MD   40 mg at 04/27/20 2140    dextrose 5 % and 0.45 % NaCl with KCl 20 mEq infusion   Intravenous Continuous Keke Mireles MD 75 mL/hr at 04/28/20 0630      potassium chloride (KLOR-CON M) extended release tablet 40 mEq  40 mEq Oral PRN Keke Mireles MD        Or    potassium bicarb-citric acid (EFFER-K) effervescent tablet 40 mEq  40 mEq Oral PRN Keke Mireles MD        Or   [facility] potassium chloride 10 mEq/100 mL IVPB (Peripheral Line)  10 mEq Intravenous PRN Keke Mireles MD        magnesium sulfate 2 g in 50 mL IVPB premix  2 g Intravenous PRN Keke Mireles MD        ceFAZolin (ANCEF) 2 g in dextrose 5 % 50 mL IVPB  2 g Intravenous On Call to [address] [place], [place]           Allergies:  No Known Allergies    Problem List:    Patient Active Problem List   Diagnosis Code    Lipidemia E78.5    CAD (coronary artery disease) I25.10    HTN (hypertension) I10    Elevated PSA R97.20    Prostate cancer ([facility] [address] C61    Vertigo R42    Neck pain M54.2    Venous stasis I87.8    Ischemic vascular dementia ([facility] 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.50    Acute metabolic encephalopathy S96.84    Hypernatremia E87.0       Past Medical History:        Diagnosis Date    Anxiety     BPH     CAD (coronary artery disease)     Cancer ([place] [address]     Dementia ([place] [address]     Depression     Hearing loss     Hyperlipidemia     Hypertension     Sinusitis     Vertigo        Past Surgical History:        Procedure Laterality Date    ANGIOPLASTY  2009       Social History:    Social History     Tobacco Use    Smoking status: Never Smoker    Smokeless tobacco: Never Used   Substance Use Topics    Alcohol use: No                                Counseling given: Not Answered      Vital Signs (Current):   Vitals:    04/28/20 0406 04/28/20 0409 04/28/20 0756 04/28/20 1105   BP: 119/77  121/73 135/75   Pulse: 50  50 82   Resp: 16  16 16   Temp: 97.8 °F (36.6 °C)  96.9 °F (36.1 °C) 97 °F (36.1 °C)   TempSrc: Axillary  Axillary Axillary   SpO2: 97%  99% 99%   Weight:  173 lb 15.1 oz (78.9 kg)     Height:                                                  BP Readings from Last 3 Encounters:   04/28/20 135/75   04/15/20 (!) 154/81   03/18/20 138/82       NPO Status:                                                                                 BMI:   Wt Readings from Last 3 Encounters:   04/28/20 173 lb 15.1 oz (78.9 kg)   04/09/20 187 lb 9.8 oz (85.1 kg)   03/17/20 200 lb (90.7 kg)     Body mass index is 22.33 kg/m².     CBC:   Lab Results   Component Value Date    WBC 4.6 04/28/2020    RBC 4.24 04/28/2020    HGB 12.8 04/28/2020    HCT 38.6 04/28/2020    MCV 90.9 04/28/2020    RDW 14.8 04/28/2020     04/28/2020       CMP:   Lab Results   Component Value Date     04/28/2020    K 4.5 04/28/2020    K 4.2 04/28/2020     04/28/2020    CO2 20 04/28/2020    BUN 21 04/28/2020    CREATININE 1.3 04/28/2020    GFRAA >60 04/28/2020    AGRATIO 1.3 04/09/2020    LABGLOM 54 04/28/2020    GLUCOSE 127 04/28/2020    PROT 7.1 04/27/2020    CALCIUM 8.9

## 2020-04-28 NOTE — CONSULTS
GI:    75year old male with advancer dementia and malnutrition. Very poor oral intake. He is clearly a candidate for tube feeding. I have had several conversation with wife and we planned to do PEG on Wednesday 4-29. She called today concerning of dehydration and abdominal pain. We referred him to  ER . We ordered ct scan of abdomen and pelvi which  showed transverse colon interposed between the stomach and anterior abdominal wall. He is therefore not a a candidate for endoscopic placement of gastrostomy tube. Surgical consult was obtained. Patient is dehydrated. Hypernatremia noted .   Abdomen: soft, no masses    Plan:  Lap assisted peg tube placement  covid 19 is not detected    Discussed with primary care physician Dr Sheba Allred at length    Appreciate surgery input    regis michael m.d.  4-27-20

## 2020-04-28 NOTE — PROGRESS NOTES
PACU Transfer Note    Vitals:    04/28/20 1815   BP: 138/78   Pulse: 79   Resp: 14   Temp: 97.3 °F (36.3 °C)   SpO2: 100%       In: 1165 [I.V.:1165]  Out: 50 [Urine:40]    Pain assessment:  none  Pain Level: resting comfortably    Report given to Receiving unit RN.    4/28/2020 6:30 PM

## 2020-04-28 NOTE — BRIEF OP NOTE
Brief Postoperative Note      Patient: Messi Hayes  YOB: 1945  MRN: [id number]    Date of Procedure: 4/28/2020    Pre-Op Diagnosis: MALNUTIRITION    Post-Op Diagnosis: Same       Procedure(s):  LAPAROSCOPIC GASTROSTOMY  TUBE PLACEMENT, ESOPHAGOGASTRODUODENOSCOPY    Surgeon(s): Bonifacio Romeo DO    Assistant:  Resident: Pacheco Shah DO PGY3    Anesthesia: General    Estimated Blood Loss (mL): 63ST    Complications: None    Specimens:   * No specimens in log *    Implants:  * No implants in log *      Drains:   Gastrostomy/Enterostomy/Jejunostomy Gastrostomy LUQ 20 fr (Active)       External Urinary Catheter (Active)   Catheter changed  No 4/28/2020  4:09 AM   Urine Color Gricelda 4/28/2020  4:09 AM   Urine Appearance Clear 4/28/2020  4:09 AM   Urine Odor Malodorous 4/28/2020  4:09 AM   Output (mL) 150 mL 4/28/2020  1:36 PM   Suction- Female Only N/A 4/27/2020 11:52 PM   Placement Initiated 4/27/2020 10:56 PM   Skin Assessment No Injury 4/28/2020  4:09 AM       [REMOVED] NG/OG/NJ/NE Tube Orogastric 16 fr Center mouth (Removed)       [REMOVED] Urethral Catheter Temperature probe 16 fr (Removed)   Catheter Indications Acute urinary retention/obstruction 4/14/2020  3:00 PM   Site Assessment No urethral drainage 4/14/2020  3:00 PM   Urine Color Gricelda 4/14/2020  3:00 PM   Urine Appearance Clear 4/14/2020  3:00 PM   Urine Odor Malodorous 4/13/2020  5:31 AM   Output (mL) 100 mL 4/14/2020  1:00 PM       Findings: Successful placement of 20Fr PEG tube. 3cm at skin and 4cm at bumper.      Electronically signed by Pacheco Shah DO on 4/28/2020 at 5:23 PM

## 2020-04-28 NOTE — PLAN OF CARE
Problem: Falls - Risk of:  Goal: Will remain free from falls  Outcome: Ongoing  Note: Remains free from falls and accidental injury. Assessed as high fall risk, all precautions in place, utilizing call light appropriately for needs. Will monitor. Problem: Fluid Volume:  Goal: Ability to achieve a balanced intake and output will improve  Outcome: Ongoing  Note: Patient receiving D5% and 0.45% NaCl w/ KCI 20 mEq at 75 mL/hour to decrease NA level of 151 on admission, down to 149 6 hours later. Patient NPO for peg tube placement today. Problem: Physical Regulation:  Goal: Will show no signs and symptoms of electrolyte imbalance  Outcome: Ongoing     Problem: Skin Integrity:  Goal: Will show no infection signs and symptoms  Outcome: Ongoing  Note: No new skin breakdown noted during this shift. Pt precious assessed q shift, will continue to monitor skin checks q4 and prn and check bony prominences for breakdown.  Pt turn q2hr, incontinent of bowel and bladder, pt being placed on special mattress, heels elevated off mattress, skin remains clean and dry, will continue to monitor

## 2020-04-28 NOTE — PROGRESS NOTES
Patient transported to the OR. Ancef and Lovenox sent down with him. Belongings packed up and too to [address]. Called and spoke with patient's wife to update her on his new room number and gave her the phone number to [address] [facility].

## 2020-04-28 NOTE — FLOWSHEET NOTE
Patient resting in bed. Placed prevalon boots. No sting barrier applied to blister on heel. Turned music on. Bed alarm on. Face washed. IV fluids infusing. Will continue to monitor.      04/28/20 0756   Vitals   Temp 96.9 °F (36.1 °C)   Temp Source Axillary   Pulse 50   Heart Rate Source Monitor   Resp 16   /73   MAP (mmHg) 89   BP Location Left upper arm   BP Upper/Lower Upper   BP Method Automatic   Patient Position Semi fowlers   Level of Consciousness 0   MEWS Score 2   Patient Currently in Pain No   Oxygen Therapy   SpO2 99 %   Pulse Oximeter Device Mode Intermittent   Pulse Oximeter Device Location Finger   O2 Device None (Room air)   PAINAD (Pain Assessment in Advance Dementia)   Breathing 0   Negative Vocalization 0   Facial Expression 0   Body Language 0   Consolability 0   PAINAD Score 0

## 2020-04-28 NOTE — CARE COORDINATION
CM following for discharge planning. Pt from home with wife and private duty nursing. [facility] Peg tube placement planned for 4/29/2020. Wife plans to take pt back home at this time. CM to continue to follow.      Erin Aguiar RN, BSN, [address]  Case Management Department  [phone]

## 2020-04-28 NOTE — PROGRESS NOTES
SCD's placed and specialty bed ordered for patient.     Electronically signed by Deon Sanders RN on 4/28/2020 at 12:17 AM

## 2020-04-29 NOTE — OP NOTE
[address]                [address]                                OPERATIVE REPORT    PATIENT NAME: Leo Leavitt                 :        1945  MED REC NO:   [id number]                          ROOM:       5306  ACCOUNT NO:   [de-identified]                           ADMIT DATE: 2020  PROVIDER:     Iftikhar Ramirez DO    DATE OF PROCEDURE:  2020    PREOPERATIVE DIAGNOSES:  1. Severe malnutrition. 2.  Altered mental status. POSTOPERATIVE DIAGNOSES:  1. Severe malnutrition. 2.  Altered mental status. OPERATIONS PERFORMED:  1. Laparoscopic gastrostomy tube placement. 2.  EGD. SURGEON:  Iftikhar Ramirez DO    ASSISTANT:  Gavin Bee DO    ANESTHESIA:  General endotracheal.    COMPLICATIONS:  None. CONDITION:  Stable. ESTIMATED BLOOD LOSS:  10 mL. INDICATIONS FOR PROCEDURE:  The patient is a 79-year-old demented  patient with severe malnutrition and dehydration who presented  originally for a PEG tube placement; however, upon further review of CT  scan, GI deemed it unsafe due to colon sandwiched in between stomach and  abdominal wall. The patient was boarded and consented for  laparoscopic-assisted gastrostomy tube placement and EGD after  understanding all risks, benefits, and alternatives of the procedure. DESCRIPTION OF PROCEDURE:  The patient was brought to the operating  suite, laid in the supine position with arms outstretched and after  induction of general endotracheal anesthesia, tube was confirmed to be  in place with end-tidal CO2 and secured. Bay catheter was placed with  clear return of urine. The patient was prepped and draped in the usual  sterile manner. A small incision was made at the left midclavicular line using the  Veress needle. Abdomen was entered and pneumoperitoneum established  with 15 mm of CO2.   A 5-mm 30-degree camera housed in a 5-mm Optiview  trocar was used to enter the abdomen under direct visualization. Once  inside the abdomen, three additional trocars were placed and attention  was paid to the stomach after the patient was placed in the reverse  Trendelenburg position. The transverse colon was seen to be very high riding and completely  encompassing the stomach. Once the stomach was identified, two stay  sutures were placed in what was thought to be the stomach; however, the  stomach was very tubular in nature and very high riding and therefore, a  diagnostic endoscopy was performed. Endoscope was passed through the oropharynx into the esophagus and down  into the stomach and once this was identified, the stay sutures that  were placed were seen to be past the stomach and actually into the  duodenum. The stay sutures were then removed. At that point, the stomach was mobilized by mobilizing the greater  curvature, finding the lesser sac and using the LigaSure device to take  down the gastroepiploics. Once this was accomplished, the greater  curvature was much more mobile and a laparoscopic-assisted endoscopic  gastrostomy tube was placed by visualizing laparoscopically a point  approximately 3 cm proximal to the pylorus and endoscope was passed into  the esophagus, into the stomach and this was identified. A 20-Citizen [place]  PEG tube kit was then opened and an angiocath needle was inserted into  the stomach with laparoscopic visualization. Once this was  accomplished, blue wire was threaded into the stomach and this was  grasped with a snare and retrieved. The 20-[place]izen [place] gastrostomy tube was  then placed into the stomach with a pull technique and was grasped at  the skin at 3 cm from the skin. The endoscope was then placed again  into the stomach and the bumper was seen to be mobile and able to be  spun; however, it was appropriately snug. Stomach was then  decompressed. The endoscope was retrieved.     An additional laparoscopic picture was taken and the stomach was seen to  be up against the anterior abdominal wall without any undue tension. Pneumoperitoneum evacuated. Skin incisions closed using 4-0 Vicryl  suture and bumper placed on the skin and the gastrostomy tube was  secured using an abdominal binder. The patient tolerated the procedure  well and was brought to the postanesthesia care unit in stable  condition. All sponge, needle and instrument counts were correct x2. I personally informed the patient's wife of the findings of the  operation.         Navi Cisse DO    D: 04/28/2020 17:33:34       T: 04/28/2020 21:48:48     [email or web address]  Job#: [id number]     Doc#: [id number]    CC:

## 2020-04-29 NOTE — PROGRESS NOTES
NUTRITION ASSESSMENT  Admission Date: 4/27/2020     Type and Reason for Visit: Initial, Consult    NUTRITION RECOMMENDATIONS:     ENTERAL NUTRITION  1. Recommend order \"Diet: Tube feed continuous/ NPO\". Initiate Jevity 1.5 formula at 20 mL/hr and as tolerated, increase by 20 mL/hr q 6 hours until goal of 70 mL/hr is met per PEG. 2. Suggest 200 mL H20 q 4 hours for 100% fluid recommendations when no IVF. 3. Monitor closely and correct lytes (K, Phos, Mg) before progressing TF to goal d/t risk of refeeding syndrome (hx extended inadequate nutritional intake). 4. Monitor for tolerance (bowel habits, N/V, cramping, abdominal distention). 5. Monitor for need to convert continuous feeding to nocturnal / prn with diet to encourage po intake, lifestyle. NUTRITION ASSESSMENT:  Consult for EN ordering and management. Patient had PEG placed yesterday and kept to gravity overnight. Pt with severe dementia and AMS, per MD likely with poor po pta. Per EMR patient has had significant wt loss ~3 weeks. Pt with dehydration and NENA. Noted muscle and fat wasting per visual exam. Will provide EN recommendations and monitor tolerance.      MALNUTRITION ASSESSMENT  Context: Chronic illness   Malnutrition Status: Mild Malnutrition(suspect mod/severe)  Findings of the 6 clinical characteristics of malnutrition (Minimum of 2 out of 6 clinical characteristics is required to make the diagnosis of moderate or severe Protein Calorie Malnutrition based on AND/ASPEN Guidelines):   Energy Intake %: Less than or equal to 50% of estimated energy requirement   Energy Intake Time: (x2 days)   Interpretation of Weight Loss %: 7.5% loss or greater   Interpretation of Weight Loss Time: (~3 weeks)   Body Fat Status: Severe subcutaneous fat loss   Body Fat Loss Location: Orbital   Muscle Mass Status: Severe muscle mass loss   Muscle Mass Loss Location: Temples (temporalis muscle)   Fluid Accumulation Status: No significant fluid accumulation   Reduced  Strength: Not measured    NUTRITION DIAGNOSIS  Problem: Severe Malnutrition  Etiology: Insufficient energy/nutrient consumption  Signs & Symptoms: Diet history of poor intake , Fat Loss  and Muscle loss     NUTRITION INTERVENTION  Food and/or Nutrient Delivery:Continue NPO or Start Tube Feeding   Nutrition education/counseling/coordination of care: Continue Inpatient Monitoring     NUTRITION MONITORING & EVALUATION:  Evaluation:Goals set   Goals: Patient will tolerate EN to meet 100% of nutrition needs via PEG  Monitoring: Nutrition Progression  or Pertinent Labs      OBJECTIVE DATA:  · Nutrition-Focused Physical Findings: s/p PEG  · Wounds Surgical Wound      Past Medical History:   Diagnosis Date    Anxiety     BPH     CAD (coronary artery disease)     Cancer ([facility]     [facility]     Depression     Hearing loss     Hyperlipidemia     Hypertension     Sinusitis     Vertigo         ANTHROPOMETRICS  Current Height: 6' 2\" (188 cm)  Current Weight: 173 lb 15.1 oz (78.9 kg)    Admission weight: 188 lb (85.3 kg)  Ideal Bodyweight 190 lb (86 kg)   Weight Changes 7.5% wt loss ~3 weeks       BMI BMI (Calculated): 22.4    Wt Readings from Last 50 Encounters:   04/28/20 173 lb 15.1 oz (78.9 kg)   04/09/20 187 lb 9.8 oz (85.1 kg)   03/17/20 200 lb (90.7 kg)   03/09/20 200 lb (90.7 kg)   02/19/20 199 lb 12.8 oz (90.6 kg)   01/23/20 201 lb (91.2 kg)   01/13/20 204 lb 9.6 oz (92.8 kg)   08/29/19 215 lb 6.4 oz (97.7 kg)   05/31/19 219 lb 4.8 oz (99.5 kg)   03/13/19 220 lb (99.8 kg)   12/05/18 221 lb (100.2 kg)   06/19/18 221 lb (100.2 kg)   05/30/18 224 lb (101.6 kg)   04/24/18 222 lb (100.7 kg)   03/15/18 212 lb (96.2 kg)   01/23/18 209 lb (94.8 kg)   12/26/17 206 lb (93.4 kg)   12/12/17 207 lb (93.9 kg)   11/01/17 209 lb (94.8 kg)   10/12/17 205 lb 12.8 oz (93.4 kg)   05/02/17 204 lb (92.5 kg)   12/28/16 211 lb 12.8 oz (96.1 kg)   09/07/16 209 lb (94.8 kg)   05/12/16 211 lb 6.4 oz (95.9 kg)   04/05/16 210 lb 12.8 oz (95.6 kg)   03/29/16 213 lb (96.6 kg)   03/20/16 212 lb (96.2 kg)   03/01/16 212 lb 9.6 oz (96.4 kg)   11/16/15 211 lb 9.6 oz (96 kg)   07/15/15 215 lb (97.5 kg)   07/10/15 217 lb (98.4 kg)   03/11/15 221 lb (100.2 kg)   01/06/15 220 lb (99.8 kg)   11/12/14 218 lb (98.9 kg)   08/06/14 219 lb (99.3 kg)   03/25/14 220 lb (99.8 kg)   05/06/13 220 lb (99.8 kg)   01/07/13 220 lb 12.8 oz (100.2 kg)   08/13/12 225 lb (102.1 kg)   04/09/12 220 lb 12.8 oz (100.2 kg)   12/12/11 222 lb 6.4 oz (100.9 kg)   08/15/11 225 lb (102.1 kg)   04/15/11 227 lb (103 kg)   12/10/10 226 lb (102.5 kg)   08/06/10 225 lb (102.1 kg)       COMPARATIVE STANDARDS  Estimated Total Kcals/Day : 30-35 Current Bodyweight (79 kg) 4973-1056 kcal    Estimated Total Protein (g/day) : 1.3-1.5 Current Bodyweight (79 kg) 118-142 g/day  Estimated Daily Total Fluid (ml/day): 2370 mL per day     Food / Nutrition-Related History  Pre-Admission / Home Diet:  Pre-Admission/Home Diet: General   Home Supplements / Herbals:    none noted  Food Restrictions / Cultural Requests:    none noted    Diet Orders / Intake / Nutrition Support  Current diet/supplement order: DIET TUBE FEED CONTINUOUS/CYCLIC NPO; STANDARD WITH FIBER; Gastrostomy; 10; 70; 24     NSG Recorded PO:   PO Fluids P.O.: 0 mL  PO Meals PO Meals Eaten (%): 26 - 50%   PO Intake: NPO  PO Supplement: NPO   PO Supplement Intake: NPO    Tube Feeding Goal: Jevity 1.5 (standard with fiber 1.5 ) @ 70 mL/hr provides 1680 mL TV, 2520 kcal, 105 grams protein, 1276 mL free water. Provide water bolus 200 mL every 4 hrs.      NUTRITION RISK LEVEL: Risk Level: High    Kylie Grubbs, [address]  [place]:  [phone]  Office:  [phone]

## 2020-04-29 NOTE — PROGRESS NOTES
Hospitalist Progress Note      PCP: Jean Holliday MD    Date of Admission: 4/27/2020    Chief Complaint: Encephalopathy    Hospital Course: 23-year-old male with a history of advanced dementia hypertension coronary artery disease status post stenting who presented with mental status changes and poor intake. Planning for PEG tube placement today    Subjective: Seen and examined  No new complaints  Creatinine 1.5 , sodium is higher, started on d 5+ NS per nephrology    Medications:  Reviewed    Infusion Medications    IV infusion builder 75 mL/hr at 04/29/20 1142     Scheduled Medications    docusate  100 mg Per G Tube BID    escitalopram  20 mg Per G Tube Daily    losartan  100 mg Per G Tube Daily    QUEtiapine  25 mg Per G Tube Nightly    lactobacillus  1 capsule Per G Tube Daily    memantine  10 mg Per G Tube BID    sodium chloride flush  10 mL Intravenous 2 times per day    enoxaparin  40 mg Subcutaneous Daily     PRN Meds: acetaminophen **OR** [DISCONTINUED] acetaminophen, polyethylene glycol, promethazine **OR** ondansetron, oxyCODONE **OR** oxyCODONE, sodium chloride flush, magnesium sulfate      Intake/Output Summary (Last 24 hours) at 4/29/2020 1322  Last data filed at 4/29/2020 1102  Gross per 24 hour   Intake 3368.75 ml   Output 200 ml   Net 3168.75 ml       Physical Exam Performed:    BP (!) 152/66   Pulse 86   Temp 97.2 °F (36.2 °C) (Axillary)   Resp 14   Ht 6' 2\" (1.88 m)   Wt 173 lb 15.1 oz (78.9 kg)   SpO2 100%   BMI 22.33 kg/m²     General appearance: Cachectic, appears to be not in distress  HEENT:  Normal cephalic, atraumatic without obvious deformity. Pupils equal, round, and reactive to light. Conjunctivae/corneas clear. Neck: Supple,. No jugular venous distention. Trachea midline. Respiratory:  Normal respiratory effort. Clear to auscultation, bilaterally without Rales/Wheezes/Rhonchi.   Cardiovascular:  Regular rate and rhythm with normal S1/S2 without murmurs, rubs 1.  NO ACUTE INTRACRANIAL ABNORMALITY. Assessment/Plan:    Active Hospital Problems    Diagnosis    Altered mental status [R41.82]    Severe protein-calorie malnutrition ([facility] [E43]    Hypernatremia [E87.0]     #Acute metabolic encephalopathy  Multifactorial hyponatremia, dehydration advanced dementia, NENA     #Hypernatremia clear due to dehydration  Received 2 L fluid volume resuscitation in ER  Will start on half-normal saline, D5W at 75 cc/h  Check electrolytes every 8 hours.       #NENA likely prerenal in setting of dehydration  UA with hyaline cast.  Continue IVF.     #Elevated troponin likely type II NSTEMI  We will monitor. EKG showed no ischemic changes.     #History of CAD  Echo 4/11/2020 borderline wall thickness, normal EF.     #Prolonged QTc interval  Continue monitor telemetry.     #Advanced dementia     #Dysphagia due to advanced dementia  Plan for PEG tube placement     #Preop clearance  RCRI 1. 6.0% risk of death, MI or cardiac arrest.   Patient is mostly bedbound. Unable to assess mets but he had a recent echo which is reassuring. Avoid medications which can prolong QTC interval  Patient is at moderate risk for undergoing surgery with general anesthesia. Keep sedation time minimum as possible.       DVT Prophylaxis: lovenox   Diet: DIET TUBE FEED CONTINUOUS/CYCLIC NPO; 1.5 Calorie with Fiber (Jevity 1.5);  Gastrostomy; Continuous; 10; 70; 24  Code Status: Full Code    PT/OT Eval Status: n/a     Dispo - inpatient ,possible d/c tomorrow to home once TF at goal and electrolytes better    Armando Sessions, MD

## 2020-04-29 NOTE — PROGRESS NOTES
GI Progress Note    Padma Gutierrez is a 75 y.o. male patient. 1. Altered mental status, unspecified altered mental status type    2. Dehydration    3. Hypernatremia    4. Severe malnutrition  4. Severe dementia    Admit Date: 2020    Subjective:       Non verbal  To start tube feeding today  Dietitian note noted. I agree with plan to restore his nutrition        Scheduled Meds:   docusate  100 mg Per G Tube BID    escitalopram  20 mg Per G Tube Daily    losartan  100 mg Per G Tube Daily    QUEtiapine  25 mg Per G Tube Nightly    lactobacillus  1 capsule Per G Tube Daily    memantine  10 mg Per G Tube BID    sodium chloride flush  10 mL Intravenous 2 times per day    enoxaparin  40 mg Subcutaneous Daily       Continuous Infusions:   IV infusion builder         PRN Meds:  acetaminophen **OR** [DISCONTINUED] acetaminophen, polyethylene glycol, promethazine **OR** ondansetron, oxyCODONE **OR** oxyCODONE, sodium chloride flush, magnesium sulfate      Objective:       Patient Vitals for the past 24 hrs:   BP Temp Temp src Pulse Resp SpO2   20 0952 (!) 152/66 97.2 °F (36.2 °C) Axillary 86 14 --   20 0300 (!) 148/64 98.4 °F (36.9 °C) Axillary 60 12 100 %   20 2306 (!) 146/73 98.6 °F (37 °C) Axillary 96 15 99 %   20 137/83 98.4 °F (36.9 °C) Axillary 77 16 100 %   20 1853 134/72 97.9 °F (36.6 °C) Axillary 73 16 95 %   20 1815 138/78 97.3 °F (36.3 °C) Temporal 79 14 100 %   20 1800 120/78 -- -- 76 9 100 %   20 1745 121/74 -- -- 64 14 99 %   20 1740 (!) 103/59 -- -- 61 13 100 %   20 1735 105/61 -- -- 62 14 100 %   20 1731 107/64 96.8 °F (36 °C) Temporal 56 14 99 %       Exam:    VITALS:  BP (!) 152/66   Pulse 86   Temp 97.2 °F (36.2 °C) (Axillary)   Resp 14   Ht 6' 2\" (1.88 m)   Wt 173 lb 15.1 oz (78.9 kg)   SpO2 100%   BMI 22.33 kg/m²   TEMPERATURE:  Current - Temp: 97.2 °F (36.2 °C);  Max - Temp  Av.5 °F (35.8 °C) Min: 95.9 °F (35.5 °C)  Max: 98.6 °F (37 °C)    NAD  Head: Normocephalic, without obvious abnormality, atraumatic  Neck: supple, symmetrical, trachea midline and thyroid not enlarged, symmetric, no tenderness/mass/nodules  CVS:  RRR, Nl s1s2  Lungs CTA Bilaterally, normal effort  Abdomen: soft, non-tender; bowel sounds normal; no masses,  no organomegaly. GT in proper position  Extremities: No edema. Lab Data:  Recent Labs     04/27/20  1218 04/27/20  2100 04/28/20  0430   WBC 6.7 5.6 4.6   HGB 14.4 13.2* 12.8*   HCT 44.0 40.0* 38.6*   MCV 91.8 91.0 90.9    163 148     Recent Labs     04/27/20  1218  04/28/20  0430 04/28/20  1238 04/29/20  0256 04/29/20  0857   *   < > 148* 144 144 148*  150*   K 4.5   < > 4.2 4.5 4.9 4.4  4.4      < > 112* 111* 108 110  111*   CO2 26   < > 27 20* 21 21  23   BUN 25*  --  21*  --   --  18   CREATININE 1.9*  --  1.3  --   --  1.5*    < > = values in this interval not displayed. Recent Labs     04/27/20 1218   AST 48*   ALT 70*   BILIDIR <0.2   BILITOT 0.6   ALKPHOS 66     No results for input(s): LIPASE, AMYLASE in the last 72 hours. Recent Labs     04/27/20  1218 04/28/20  0430   PROT 7.1  --    INR  --  1.49*     No results for input(s): PTT in the last 72 hours. No results for input(s): OCCULTBLD in the last 72 hours. Radiology review: as in recoird    Assessment:       Active Problems:    Hypernatremia    Altered mental status    Severe protein-calorie malnutrition ([address]  Resolved Problems:    * No resolved hospital problems.  *  severe dementia  S/p G tube insertion    Recommendations:       Start tube feeding as planned    Jordan Silva MD  4/29/2020  11:20 AM

## 2020-04-29 NOTE — CARE COORDINATION
CM following pt till DC. Pt will go home with wife and private duty nursing and [facility]. Peg tube in place. Tube feeds running.  Pt is nonverbal. Electronically signed by Mell Jorgensen RN on 4/29/2020 at 2:29 PM

## 2020-04-29 NOTE — PROGRESS NOTES
Patient alert, unable to determined if oriented. Patient did try to verbalize during care, and seem to realize wife was on phone when she called. Peg tube in place, Standard with fiber tube feed started at 10 am, per dietician, increase by 20 every 6 hours with 200 ml flush q 4 hours if IV not infusing. Currently no IV access, several attempts made, called for placement assistance. Patient on specialized bed, turning q 2 hours, heel protectors on. Patient incontinent bowel and bladder, zack care given as needed. Vitals stable, no visual distress that would indicated pain. Patient is resistant to care, reassurance given. Lungs clear. Abdomen soft, bowel sounds heard. Bed alarm on, will monitor.

## 2020-04-29 NOTE — PLAN OF CARE
Problem: Falls - Risk of:  Goal: Will remain free from falls  Description: Will remain free from falls  Outcome: Ongoing   Pt is considered a high fall risk. Pt is up with maximove. Fall precautions are in place. Bed remains locked, in lowest position with 3/4 side rails up. Call light and bedside table remain within reach. Bed alarm engaged. Problem: Fluid Volume:  Goal: Ability to achieve a balanced intake and output will improve  Description: Ability to achieve a balanced intake and output will improve  Outcome: Ongoing   Pt remains NPO. IVF infusing. Pt incontinent throughout the night.

## 2020-04-29 NOTE — PROGRESS NOTES
Department of Surgery:  Post-op Note    Procedure(s) Performed: Laparoscopic assisted PEG tube placement     Subjective:   Patient with no issues post operatively. Patient is nonverbal. Appears comfortable.      Objective:  Anesthesia type: General      I/O    Intra op    Post op     Fluids  1100 mL      EBL 10 mL 0 mL     Urine 0 mL 1x     Exam:  Vitals:    04/28/20 1815 04/28/20 1853 04/28/20 2020 04/28/20 2306   BP: 138/78 134/72 137/83 (!) 146/73   Pulse: 79 73 77 96   Resp: 14 16 16 15   Temp: 97.3 °F (36.3 °C) 97.9 °F (36.6 °C) 98.4 °F (36.9 °C) 98.6 °F (37 °C)   TempSrc: Temporal Axillary Axillary Oral   SpO2: 100% 95% 100% 99%   Weight:       Height:           General appearance: alert, no acute distress  Chest/Lungs: normal effort, symmetric chest rise, on RA  Cardiovascular: RRR  Abdomen: soft, appropriately tender, incisions c/d/i and well approximated, G tube to drainage bag with minimal gastric output  Extremities: no edema, no cyanosis  Neuro: nonverbal    Assessment and Plan  This is a 75y.o. year old male s/p laparoscopic assisted PEG placement POD #0    Pain management: Roxicodone solution prn, Tylenol prn  Cardiovascular: Hemodynamically stable, continue to monitor  Respiratory: extubated, encourage hourly incentive spirometry and deep breathing  FEN:  Fluids: mIVF 75mL/h, Diet: NPO, PEG tube to gravity  : Urine output is adequate  PEG to remain to gravity overnight  Ambulation: OOB to chair, encourage ambulation  Prophylaxis: abdoulaye Nicolas DO  04/28/20  11:53 PM  [phone]

## 2020-04-29 NOTE — PROGRESS NOTES
Pt has been nonverbal. VSS. Pt does not appear to be in pain. Abdominal binder in place to prevent pt from pulling at G tube. Condom cath in place. Pt being repositioned q2. SCDs and prevalon boots in place. Bed alarm engaged.

## 2020-04-29 NOTE — PLAN OF CARE
Problem: Falls - Risk of:  Goal: Will remain free from falls  Description: Will remain free from falls  4/29/2020 1738 by Carlos Menjivar RN  Outcome: Met This Shift  Note: Patient with dementia, history of falls, can not follow directions. Bed in low position, alarm on wheels locked. Hourly checks on needs. Safety maintained. 4/29/2020 0419 by Ruthie Oliva RN  Outcome: Ongoing     Problem: Fluid Volume:  Goal: Ability to achieve a balanced intake and output will improve  Description: Ability to achieve a balanced intake and output will improve  4/29/2020 1738 by Carlos Menjivar RN  Outcome: Ongoing  Note: Tube feed started and increased to 30 ml/hr with 200 ml flush q 4 hours. Tolerating.    4/29/2020 0419 by Ruthie Oliva RN  Outcome: Ongoing     Problem: Skin Integrity:  Goal: Will show no infection signs and symptoms  Description: Will show no infection signs and symptoms  Outcome: Ongoing  Note: Skin stable. Small stage 2 on coccyx, covered with mepilex. Blister left heel. Heel protectors on. Alternating air mattress in use. Wedge pillow used to support patient side to side q 2 hours. Incontinence care given as needed.   Will monitor

## 2020-04-29 NOTE — PROGRESS NOTES
Current - Temp: 98.4 °F (36.9 °C); Max - Temp  Av.5 °F (35.8 °C)  Min: 95.9 °F (35.5 °C)  Max: 98.4 °F (36.9 °C)    Non verbal  Head: Normocephalic, without obvious abnormality, atraumatic  Neck: supple, symmetrical, trachea midline and thyroid not enlarged, symmetric, no tenderness/mass/nodules  CVS:  RRR, Nl s1s2  Lungs CTA Bilaterally, normal effort  Abdomen: soft, non-tender; bowel sounds normal; no masses,  no organomegaly. Gt in good position  Extremities: No edema. Lab Data:  Recent Labs     20  1218 20  0430   WBC 6.7 5.6 4.6   HGB 14.4 13.2* 12.8*   HCT 44.0 40.0* 38.6*   MCV 91.8 91.0 90.9    163 148     Recent Labs     20  1218 20  2100 20  0430 20  1238   * 149* 148* 144   K 4.5 4.2 4.2 4.5    110 112* 111*   CO2 26 28 27 20*   BUN 25*  --  21*  --    CREATININE 1.9*  --  1.3  --      Recent Labs     20  1218   AST 48*   ALT 70*   BILIDIR <0.2   BILITOT 0.6   ALKPHOS 66     No results for input(s): LIPASE, AMYLASE in the last 72 hours. Recent Labs     208 20  0430   PROT 7.1  --    INR  --  1.49*     No results for input(s): PTT in the last 72 hours. No results for input(s): OCCULTBLD in the last 72 hours. Radiology review: as in records    Assessment:       Active Problems:    Hypernatremia    Altered mental status    Severe protein-calorie malnutrition ([address]  Resolved Problems:    * No resolved hospital problems.  *  s/p G tube insertion    Recommendations:       Start tube feeding in am  Discussed with PCP Dr Flower Da Silva  Discussed with NANCY Sepulveda MD  2020  8:33 PM

## 2020-04-30 NOTE — PROGRESS NOTES
Pt back to room 5306 from OR, Pt is asleep. VSS. Pt has NG in place. G tube in place with brown drainage in bag. Oral care given.  Will continue to monitor

## 2020-04-30 NOTE — PROGRESS NOTES
Patient admitted to PACU # 13 from OR at 0127 post OPEN JANEWAY GASTROSTOMY, CLOSURE OF GASTROTOMY WITH INTRA ABDOMINAL OMENTAL FLAP, ENDOSCOPIC ASSISTED NASAL GASTRIC TUBE PLACEMENT  per DR. Sally Coronado. Attached to PACU monitoring system and report received from anesthesia provider. Patient was reported to be hemodynamically stable during procedure. Patient drowsy on admission with no complaints of pain.

## 2020-04-30 NOTE — ANESTHESIA POSTPROCEDURE EVALUATION
Department of Anesthesiology  Postprocedure Note    Patient: Emi Deluca  MRN: [id number]  YOB: 1945  Date of evaluation: 4/30/2020  Time:  1:41 AM     Procedure Summary     Date:  04/29/20 Room / Location:  [address] [place] [address]    Anesthesia Start:  2313 Anesthesia Stop:  04/30/20 0140    Procedure:  OPEN JANEWAY GASTROSTOMY, CLOSURE OF GASTROTOMY WITH INTRA ABDOMINAL OMENTAL FLAP, ENDOSCOPIC ASSISTED NASAL GASTRIC TUBE PLACEMENT (N/A Abdomen) Diagnosis:  (malnutrition)    Surgeon: Jessica Corea DO Responsible Provider:  Severa Acres, MD    Anesthesia Type:  general ASA Status:  3 - Emergent          Anesthesia Type: No value filed. Diana Phase I: Diana Score: 8    Diana Phase II:      Last vitals: Reviewed and per EMR flowsheets.        Anesthesia Post Evaluation    Patient location during evaluation: PACU  Patient participation: complete - patient participated  Level of consciousness: awake and alert  Pain score: 0  Airway patency: patent  Nausea & Vomiting: no nausea and no vomiting  Complications: no  Cardiovascular status: hemodynamically stable  Respiratory status: acceptable  Hydration status: euvolemic

## 2020-04-30 NOTE — PROGRESS NOTES
Message to dr Valerie Garcia to report slight temp of 100.1 axillary with no treatment due to unable to take PO. She stated understanding and to continue to monitor at this time.  Electronically signed by Nessa Kenney RN on 4/30/2020 at 2:39 PM

## 2020-04-30 NOTE — CARE COORDINATION
Pt pulled G tube out over night. Pt back to OR for [address] gastrostomy POD 0. UGI 5/1 before feeds can start. Pt in restraints. Will go home with wife and HHC/private duty. Earliest DC per surgery 5/3. Will continue to follow till DC.   Electronically signed by Roseline Aragon RN on 4/30/2020 at 9:04 AM

## 2020-04-30 NOTE — PROGRESS NOTES
Pt continues to require wrist restraints due to pulling when releases for turning, toileting and skin checks. Pt has had times of alertness however does not respond to nurse. He will pull away from painful stimuli. Pt continues to have G tube to gravity and NG to low wall suction. Nurse unable to ambulate due to lethargic. Bed alarm is on and call light is within reach.  Electronically signed by Narcisa Gibbs RN on 4/30/2020 at 4:56 PM

## 2020-04-30 NOTE — PLAN OF CARE
Problem: Physical Regulation:  Goal: Will show no signs and symptoms of electrolyte imbalance  Description: Will show no signs and symptoms of electrolyte imbalance  Note: Pt sodium level is WNL. Will continue to monitor     Problem: Restraint Use - Nonviolent/Non-Self-Destructive Behavior:  Goal: Absence of restraint-related injury  Description: Absence of restraint-related injury  Note: Pt has no signs of restraint related injury.  Will continue to assess every hour

## 2020-04-30 NOTE — ANESTHESIA PRE PROCEDURE
daily. 8/6/10   Historical Provider, MD       Current medications:    Current Facility-Administered Medications   Medication Dose Route Frequency Provider Last Rate Last Dose    acetaminophen (TYLENOL) tablet 650 mg  650 mg Per G Tube Q6H PRN Tony Oconnor MD        polyethylene glycol (GLYCOLAX) packet 17 g  17 g Per G Tube Daily PRN Tony Oconnor MD        promethazine (PHENERGAN) tablet 12.5 mg  12.5 mg Per G Tube Q6H PRN Tony Oconnor MD        Or    ondansetron (ZOFRAN) injection 4 mg  4 mg Intravenous Q6H PRN Tony Oconnor MD        docusate (COLACE) 50 MG/5ML liquid 100 mg  100 mg Per G Tube BID Heidi Estrada MD   100 mg at 04/29/20 2048    escitalopram (LEXAPRO) tablet 20 mg  20 mg Per G Tube Daily Tony Oconnor MD   20 mg at 04/29/20 1000    losartan (COZAAR) tablet 100 mg  100 mg Per G Tube Daily Tony Oconnor MD   100 mg at 04/29/20 1000    QUEtiapine (SEROQUEL) tablet 25 mg  25 mg Per G Tube Nightly Tony Oconnor MD   25 mg at 04/29/20 2048    lactobacillus (CULTURELLE) capsule 1 capsule  1 capsule Per G Tube Daily Tony Oconnor MD   1 capsule at 04/29/20 0959    memantine (NAMENDA) tablet 10 mg  10 mg Per G Tube BID Tony Oconnor MD   10 mg at 04/29/20 2048    ceFAZolin (ANCEF) 2 g in dextrose 5 % 50 mL IVPB  2 g Intravenous On Call to [address],         oxyCODONE (ROXICODONE) 5 MG/5ML solution 5 mg  5 mg Per G Tube Q6H PRN Magi Chinadle, DO        Or    oxyCODONE (ROXICODONE) 5 MG/5ML solution 10 mg  10 mg Per G Tube Q6H PRN Magi Chinadle, DO        sodium chloride flush 0.9 % injection 10 mL  10 mL Intravenous 2 times per day Heidi Estrada MD   10 mL at 04/29/20 2049    sodium chloride flush 0.9 % injection 10 mL  10 mL Intravenous PRN Heidi Estrada MD        enoxaparin (LOVENOX) injection 40 mg  40 mg Subcutaneous Daily Heidi Estrada MD   40 mg at 04/29/20 2049    magnesium sulfate 2 g in 50 mL IVPB premix  2 g Intravenous PRN Heidi Estrada MD CRNA.    Attending anesthesiologist reviewed and agrees with Pre Eval content        Noni Clark MD   4/29/2020

## 2020-04-30 NOTE — BRIEF OP NOTE
Brief Postoperative Note      Patient: Kerrie Stack  YOB: 1945  MRN: [id number]    Date of Procedure: 4/29/2020    Pre-Op Diagnosis: malnutrition, displaced gastrostomy tube    Post-Op Diagnosis: Same       Procedure(s):  OPEN JANEWAY GASTROSTOMY, CLOSURE OF GASTROTOMY WITH INTRA ABDOMINAL OMENTAL FLAP, ENDOSCOPIC ASSISTED NASAL GASTRIC TUBE PLACEMENT    Surgeon(s): General Archibald,     Assistant:  Resident: Alin Brasher MD    Anesthesia: General    Estimated Blood Loss (mL): Minimal    Complications: None    Specimens:   * No specimens in log *    Implants:  * No implants in log *      Drains:   Gastrostomy/Enterostomy/Jejunostomy Gastrostomy LUQ 20 fr (Active)   Site Description Healing 4/29/2020  4:04 PM   Drain Status Continuous 4/29/2020 11:02 AM   Surrounding Skin Dry; Intact 4/29/2020 11:02 AM   Dressing Status Clean;Dry; Intact 4/28/2020  8:20 PM   Dressing Type Open to air 4/29/2020 11:02 AM   Tube Feeding Standard with Fiber 4/29/2020  4:04 PM   Rate/Schedule 30 mL/hr 4/29/2020  4:04 PM   Free Water Flush (mL) 200 mL 4/29/2020  4:04 PM   Free Water Rate 200 4/29/2020  4:04 PM   Output (mL) 0 ml 4/28/2020 11:06 PM   Residual Volume (ml) 0 ml 4/29/2020  4:04 PM       [REMOVED] NG/OG/NJ/NE Tube Orogastric 16 fr Center mouth (Removed)       [REMOVED] Urethral Catheter Temperature probe 16 fr (Removed)   Catheter Indications Acute urinary retention/obstruction 4/14/2020  3:00 PM   Site Assessment No urethral drainage 4/14/2020  3:00 PM   Urine Color Gricelda 4/14/2020  3:00 PM   Urine Appearance Clear 4/14/2020  3:00 PM   Urine Odor Malodorous 4/13/2020  5:31 AM   Output (mL) 100 mL 4/14/2020  1:00 PM       [REMOVED] External Urinary Catheter (Removed)   Catheter changed  Yes 4/28/2020 11:06 PM   Urine Color Yellow 4/28/2020 11:06 PM   Urine Appearance Clear 4/28/2020 11:06 PM   Urine Odor Malodorous 4/28/2020  4:09 AM   Output (mL) 0 mL 4/28/2020 11:06 PM   Suction- Female Only N/A 4/27/2020 11:52 PM   Placement Initiated 4/27/2020 10:56 PM   Skin Assessment No Injury 4/28/2020  4:09 AM       Findings: Gastrostomy tube in good position, 8cc saline in balloon, 10 cm at the skin     Electronically signed by Natalia Estrada MD on 4/30/2020 at 1:24 AM

## 2020-04-30 NOTE — PLAN OF CARE
Pt has remained free from falls this shift. No sign of new skin impairment at this time. Continues to have wrist restraints in use due to pulling at NG/G tubes.

## 2020-04-30 NOTE — PROGRESS NOTES
PACU Transfer Note    Vitals:    04/30/20 0200   BP: 115/66   Pulse: 77   Resp: 9   Temp: 98.7 °F (37.1 °C)   SpO2: 94%       In: 1956 [I.V.:1956]  Out: 50     Pain assessment:  none  Pain Level: (calm)    Report given to Receiving unit RN.    4/30/2020 2:10 AM

## 2020-04-30 NOTE — PROGRESS NOTES
Notified by nursing that patient pulled G tube. Attempted to replace with 20fr/18fr/16fr without success. Will plan to take patient to OR urgently for open G tube placement.     Discussed with Dr. Deonte Flores MD PGY-3  04/29/20  9:44 PM  [phone]

## 2020-04-30 NOTE — PROGRESS NOTES
143 147*  148* 145   K 4.2   < > 4.4  4.4   < > 4.4 4.3  4.2 4.3   *   < > 110  111*   < > 107 111*  111* 109   CO2 27   < > 21  23   < > 22 25  24 27   BUN 21*  --  18  --   --  20  --    CREATININE 1.3  --  1.5*  --   --  1.5*  --    CALCIUM 8.9  --  8.9  --   --  8.4  --    PHOS  --   --   --   --   --  3.4  --     < > = values in this interval not displayed. No results for input(s): AST, ALT, BILIDIR, BILITOT, ALKPHOS in the last 72 hours. Recent Labs     04/28/20  0430   INR 1.49*     Recent Labs     04/29/20  0857   TROPONINI 0.04*       Urinalysis:      Lab Results   Component Value Date    NITRU Negative 04/27/2020    WBCUA 3-5 04/27/2020    BACTERIA 1+ 04/27/2020    RBCUA 5-10 04/27/2020    BLOODU MODERATE 04/27/2020    SPECGRAV >=1.030 04/27/2020    GLUCOSEU Negative 04/27/2020       Radiology:  XR CHEST PORTABLE   Final Result      No acute cardiopulmonary disease      CT ABDOMEN PELVIS WO CONTRAST Additional Contrast? None   Final Result   1. No acute abnormality of the abdomen or pelvis. 2. Elevation of the left hemidiaphragm, unchanged from prior studies. 3. Multiple gas opacified loops of large and small bowel are again noted. No obstruction. Transverse colon is interposed between the stomach and anterior abdominal wall. CT HEAD WO CONTRAST   Final Result      1. NO ACUTE INTRACRANIAL ABNORMALITY.                Assessment/Plan:    Active Hospital Problems    Diagnosis Date Noted    Dislodged gastrostomy tube ([facility] [Z43.1]     Altered mental status [R41.82]     Severe protein-calorie malnutrition ([facility] [E43]     Hypernatremia [E87.0] 04/27/2020       Plan:    # Acute metabolic encephalopathy  -Multifactorial, low Na, dehydration, dementia, NENA    # Hypernatremia  -Due to dehydration  -Continue IVF, follow electrolytes    # NENA  -pre-renal from dehydration  -Continue IVF    # Elevated troponin  -No EKG changes    # Advanced dementia  # Dysphagia due to advanced dementia  -PEG tube placement today    DVT Prophylaxis: Lovenox  Diet: Diet NPO Effective Now  Code Status: Full Code    PT/OT Eval Status: n/a    Dispo - Pratibha Jarvis MD    # Medical Decision Making Complexity: high    Problem   Established problem, stable (1): NENA   Established problem, stable (1): Encephalopathy   Established problem, stable (1): Dementia   Established problem, stable (1): Dysphagia     Risk:  High:   Chronic Illness with severe exacerbation/progression: Dementia, now w/ dysphagia requiring PEG

## 2020-04-30 NOTE — PROGRESS NOTES
Several attempts were made to get NG, pt unable to tolerate. Pt IV placed so pt could taken down to OR.

## 2020-05-01 NOTE — PLAN OF CARE
Problem: Restraint Use - Nonviolent/Non-Self-Destructive Behavior:  Goal: Absence of restraint-related injury  Description: Absence of restraint-related injury  Note: No evidence of restraint related injury. Pt be assessed every hour.  Will continue to monitor

## 2020-05-01 NOTE — PROGRESS NOTES
150*   < > 147*  148*   < > 145 148* 144   K 4.4  4.4   < > 4.3  4.2   < > 3.7 4.6 3.8     111*   < > 111*  111*   < > 109 108 105   CO2 21  23   < > 25  24   < > 27 27 30   PHOS  --   --  3.4  --  2.9  --   --    BUN 18  --  20  --  19  --   --    CREATININE 1.5*  --  1.5*  --  1.2  --   --     < > = values in this interval not displayed. No results for input(s): AST, ALT, ALB, BILIDIR, BILITOT, ALKPHOS in the last 72 hours. No results for input(s): LIPASE, AMYLASE in the last 72 hours. No results for input(s): PROT, INR in the last 72 hours. No results for input(s): PTT in the last 72 hours. No results for input(s): OCCULTBLD in the last 72 hours. Assessment:       Active Problems:    Hypernatremia    Altered mental status    Severe protein-calorie malnutrition ([address]    Dislodged gastrostomy tube (HCC)  Resolved Problems:    * No resolved hospital problems.  *        Recommendations:       Tube feeding as per surgery and Laurence Pocne MD  5/1/2020  7:12 PM

## 2020-05-01 NOTE — PROGRESS NOTES
GI Progress Note    Tana Parnell is a 75 y.o. male patient. 1. Altered mental status, unspecified altered mental status type    2. Dehydration    3. Hypernatremia        Admit Date: 2020    Subjective:       Non verbal  Tube was pulled by paitient and replaced in OR  No feeding at present    Scheduled Meds:   enoxaparin  40 mg Subcutaneous Daily       Continuous Infusions:   sodium chloride 75 mL/hr at 20 0617       PRN Meds:  HYDROmorphone **OR** HYDROmorphone, promethazine **OR** ondansetron, magnesium sulfate      Objective:       Patient Vitals for the past 24 hrs:   BP Temp Temp src Pulse Resp SpO2   20 (!) 146/85 98.7 °F (37.1 °C) Axillary 68 16 98 %   20 1416 (!) 156/72 100.1 °F (37.8 °C) Axillary 61 16 97 %   20 1014 132/80 98.1 °F (36.7 °C) Axillary 57 16 95 %   20 0909 125/75 98.3 °F (36.8 °C) -- 57 16 95 %   20 0229 116/69 97.8 °F (36.6 °C) Axillary 65 12 97 %   20 0200 115/66 98.7 °F (37.1 °C) Temporal 77 9 94 %   20 0145 125/82 -- -- 82 10 96 %   20 0135 (!) 142/76 -- -- 86 10 98 %   20 0130 128/73 -- -- 82 11 96 %   20 0127 (!) 144/60 98.6 °F (37 °C) Temporal 80 10 94 %       Exam:    VITALS:  BP (!) 146/85   Pulse 68   Temp 98.7 °F (37.1 °C) (Axillary)   Resp 16   Ht 6' 2\" (1.88 m)   Wt 173 lb 15.1 oz (78.9 kg)   SpO2 98%   BMI 22.33 kg/m²   TEMPERATURE:  Current - Temp: 98.7 °F (37.1 °C); Max - Temp  Av.8 °F (36 °C)  Min: 91.9 °F (33.3 °C)  Max: 100.1 °F (37.8 °C)    N  Head: Normocephalic, without obvious abnormality, atraumatic  Neck: supple, symmetrical, trachea midline and thyroid not enlarged, symmetric, no tenderness/mass/nodules  CVS:  RRR, Nl s1s2  Lungs CTA Bilaterally, normal effort  Abdomen: soft, gt site dressed  Extremities: No edema.     Lab Data:  Recent Labs     20  2100 20  0430 20  0437   WBC 5.6 4.6 9.4   HGB 13.2* 12.8* 12.0*   HCT 40.0* 38.6* 37.1*   MCV 91.0 90.9 92.9    148 142     Recent Labs     04/28/20  0430  04/29/20  0857  04/29/20 2022 04/30/20  0437 04/30/20  0749 04/30/20  1608   *   < > 148*  150*   < > 143 147*  148* 145 147*   K 4.2   < > 4.4  4.4   < > 4.4 4.3  4.2 4.3  --    *   < > 110  111*   < > 107 111*  111* 109  --    CO2 27   < > 21  23   < > 22 25  24 27  --    PHOS  --   --   --   --   --  3.4  --   --    BUN 21*  --  18  --   --  20  --   --    CREATININE 1.3  --  1.5*  --   --  1.5*  --   --     < > = values in this interval not displayed. No results for input(s): AST, ALT, ALB, BILIDIR, BILITOT, ALKPHOS in the last 72 hours. No results for input(s): LIPASE, AMYLASE in the last 72 hours. Recent Labs     04/28/20 0430   INR 1.49*     No results for input(s): PTT in the last 72 hours. No results for input(s): OCCULTBLD in the last 72 hours. Assessment:       Active Problems:    Hypernatremia    Altered mental status    Severe protein-calorie malnutrition ([address]    Dislodged gastrostomy tube (HCC)  Resolved Problems:    * No resolved hospital problems.  *        Recommendations:       As per surgery team    Tana Nuñez MD  4/30/2020  8:20 PM

## 2020-05-01 NOTE — OP NOTE
tube was confirmed to be  in place with end-tidal CO2 and secured. The patient was prepped and  draped in the usual sterile manner. A midline incision was made using a 10-blade and dissection was carried  down through the subcutaneous tissue through the fascia. Peritoneum was  elevated and incised and abdomen was entered. Upon entering, the  stomach was seen to be up against the abdominal wall; however, the tract  had not matured, therefore the stomach was easily pulled away and a  small approximately 3 mm hole was appreciated along the antrum of the  stomach. This was closed in two layers using 3-0 Vicryl and 3-0 silk  sutures, and a piece of omentum was mobilized and a tongue created using  Bovie electrocautery and this was brought up and sutured over the repair  to provide further buttressing using a well-vascularized intraabdominal  omental flap. At that point, attention was paid along the greater curvature and two  Babcocks were used to provide lateral traction clearly identifying the  greater curvature of the stomach. The greater curvature had already  been freed from the gastroepiploic attachments from the previous  operation and an [employer] [place] Flex 60 blue staple load was used to  create a narrow tubular stomach, which would easily come up through the  abdominal wall. At that point, we chose the site on the left lateral  abdominal wall approximately three fingerbreadths below the costal  margin, and an incision was made and dissection was carried down  splitting the rectus muscle and this tubularized stomach was brought out  through this opening in the abdominal wall. At that point, the edge was  cut open and the stomach was entered, and this was matured by suturing  the end of the stomach to the subcutaneous tissue and skin to perform  our gastrostomy. Multiple 3-0 Vicryl sutures were used  circumferentially to secure this.   At that point, a 20-Lith[place]ian SHERRON tube  was placed through this ostomy and palpated going into the body of the  stomach. The balloon was filled with 10 mL of saline and the stomach  had no obvious twist or kinks in it. At that point, the nasogastric tube was attempted to be inserted by the  anesthesiologist; however, after multiple failed attempts, an endoscopy  was performed and the nasogastric tube attached to the endoscope for  visualized placement. The endoscope was passed through the oropharynx  down to the esophagus and into the stomach. The nasogastric tube was  then placed in the body of the stomach and the tube was secured by  bridling at the nostril. At that point, the balloon of the tube was  visualized and the endoscope was passed through the antrum and through  the pylorus into the small intestine. The duodenum was appreciated and  at that point, the patency of the stomach was confirmed and rest of the  stomach was decompressed. Endoscope was removed. All sponge, needle, and instrument counts were correct at that point and  irrigation was performed. Fascial closure was performed using #1 loop  PDS x2. Once this was accomplished, irrigation was performed and then  skin incision was closed using skin staples. The SHERRON tube was secured  to the skin at 10 cm erin using nylon 2-0 sutures to secure the bumper. The tube was further secured with tape and abdominal binder. The  patient tolerated the procedure well and was brought to the  postanesthesia care unit in stable condition. All sponge, needle and instrument counts were correct x2. I had spoken to the wife before the operation and she had instructed me  to call her in the morning, therefore no family was called at the end of  the operation.         Sharon Rubalcava DO    D: 04/30/2020 10:27:34       T: 04/30/2020 12:13:13     [email or web address]  Job#: [id number]     Doc#: [id number]    CC:

## 2020-05-01 NOTE — PROGRESS NOTES
Pt turned and wedge placed under him to relief pressure. Condom catheter was also put in to help keep him dry.  Will continue to monitor

## 2020-05-01 NOTE — CARE COORDINATION
Ng remains in place. G tube to hawkins bag. Pt is in restraints. Pt is from home with wife. Pt is active with [facility]/Home care assistance (private duty). Abi Purvis UGI 5/2, if okay will start tube feeds. Referral made to [facility] for tube feeds. Will need a new order for tube feeds. DC possible 5/3 if able to tolerate tube feeds. CM spoke to wife. Will put in home care for Nursing, Pt/OT/aide service. Will monitor till DC.  Electronically signed by Johanna Emery RN on 5/1/2020 at 12:38 PM

## 2020-05-01 NOTE — PROGRESS NOTES
Pt is disoriented and not alert. Pt does open eyes to voice. Pt is in restraints d/t pulling at lines. Pt has condom cath in place. gtube to gravity. On speciality bed, q 2 turns. NPO. Oral care being provided. Ng in right nare. On 2 L o2. No new skin issues not listed in LDA's will continue to monitor.

## 2020-05-01 NOTE — PLAN OF CARE
Problem: Falls - Risk of:  Goal: Will remain free from falls  Description: Will remain free from falls  Outcome: Ongoing  Note: Pt bed in low position and alarm on. Non skid socks on. Belongings, bedside table, and call light within reach. 2/4 side rails up. Pt in restraints for pulling tubes. Will continue to monitor. Problem: Skin Integrity:  Goal: Will show no infection signs and symptoms  Description: Will show no infection signs and symptoms  Outcome: Ongoing  Note: Will continue monitor pt vs, labs, surgical sites, and other indictors of infection.

## 2020-05-01 NOTE — PROGRESS NOTES
Hospitalist Progress Note      PCP: Nivia Magdaleno MD    Date of Admission: 4/27/2020    Chief Complaint: encephalopathy    Subjective:  Patient seen and examined at the bedside. No complaints at this time. PEG in place. Plan for upper endoscopy tomorrow and then if that is ok will restart tube feeds. PFHS: reviewed as documented 4/27/2020, no changes      Medications:  Reviewed    Infusion Medications    sodium chloride 75 mL/hr at 04/30/20 2140     Scheduled Medications    metoclopramide  5 mg Intravenous Q8H    enoxaparin  40 mg Subcutaneous Daily     PRN Meds: HYDROmorphone **OR** HYDROmorphone, promethazine **OR** ondansetron, magnesium sulfate      Intake/Output Summary (Last 24 hours) at 5/1/2020 1214  Last data filed at 5/1/2020 1055  Gross per 24 hour   Intake 10 ml   Output 1550 ml   Net -1540 ml         Physical Exam Performed:    /63   Pulse 87   Temp 98.8 °F (37.1 °C) (Axillary)   Resp 16   Ht 6' 2\" (1.88 m)   Wt 173 lb 15.1 oz (78.9 kg)   SpO2 97%   BMI 22.33 kg/m²     General appearance:  No apparent distress, appears stated age  Eyes: Pupils equal, round, reactive to light, conjunctiva/corneas clear  Ears/Nose/Mouth/Throat: No external lesions or scars, hearing intact to voice  Neck: Trachea midline, no masses noted  Respiratory:  Normal respiratory effort. Clear to auscultation bilaterally  Cardiovascular: Regular rate and rhythm, nl S1/S2, w/o murmurs, rubs or gallops, no lower extremity edema  Abdomen: Soft, non-tender, non-distended, no hepatosplenomegaly  Musculoskeletal: No cyanosis, clubbing or petechiae, no lower extremity misalignment, asymmetry, or crepitation  Skin: Normal skin color, texture, turgor. No rashes or lesions noted.   Psychiatric: Encephalopathic, good insight and judgment    Labs:   Recent Labs     04/30/20  0437 05/01/20  0501   WBC 9.4 7.7   HGB 12.0* 11.4*   HCT 37.1* 34.4*    129*     Recent Labs     04/29/20  0857  04/30/20 0437 04/30/20 2036 05/01/20  0501 05/01/20  1115   *  150*   < > 147*  148*   < > 149* 145 148*   K 4.4  4.4   < > 4.3  4.2   < > 4.2 3.7 4.6     111*   < > 111*  111*   < > 110 109 108   CO2 21  23   < > 25  24   < > 25 27 27   BUN 18  --  20  --   --  19  --    CREATININE 1.5*  --  1.5*  --   --  1.2  --    CALCIUM 8.9  --  8.4  --   --  8.3  --    PHOS  --   --  3.4  --   --  2.9  --     < > = values in this interval not displayed. No results for input(s): AST, ALT, BILIDIR, BILITOT, ALKPHOS in the last 72 hours. No results for input(s): INR in the last 72 hours. Recent Labs     04/29/20  0857   TROPONINI 0.04*       Urinalysis:      Lab Results   Component Value Date    NITRU Negative 04/27/2020    WBCUA 3-5 04/27/2020    BACTERIA 1+ 04/27/2020    RBCUA 5-10 04/27/2020    BLOODU MODERATE 04/27/2020    SPECGRAV >=1.030 04/27/2020    GLUCOSEU Negative 04/27/2020       Radiology:  XR CHEST PORTABLE   Final Result      No acute cardiopulmonary disease      CT ABDOMEN PELVIS WO CONTRAST Additional Contrast? None   Final Result   1. No acute abnormality of the abdomen or pelvis. 2. Elevation of the left hemidiaphragm, unchanged from prior studies. 3. Multiple gas opacified loops of large and small bowel are again noted. No obstruction. Transverse colon is interposed between the stomach and anterior abdominal wall. CT HEAD WO CONTRAST   Final Result      1. NO ACUTE INTRACRANIAL ABNORMALITY.          FL UGI    (Results Pending)         Assessment/Plan:    Active Hospital Problems    Diagnosis Date Noted    Dislodged gastrostomy tube ([facility] [Z43.1]     Altered mental status [R41.82]     Severe protein-calorie malnutrition ([facility] [E43]     Hypernatremia [E87.0] 04/27/2020       Plan:    # Acute metabolic encephalopathy  -Multifactorial, low Na, dehydration, dementia, NENA     # Hypernatremia  -Due to dehydration  -Continue IVF, follow electrolytes     # NENA  -pre-renal from dehydration  -improving, Continue IVF     # Elevated troponin  -No EKG changes     # Advanced dementia  # Dysphagia due to advanced dementia  -PEG tube placement yesterday  -upper endoscopy tomorrow, then if ok will restart tube feeds    DVT Prophylaxis: Lovenox  Diet: Diet NPO Effective Now  Code Status: Full Code    PT/OT Eval Status: n/a    Dispo - Kvng Russell MD    # Medical Decision Making Complexity: high     Problem              Established problem, stable (1): NENA              Established problem, stable (1): Encephalopathy              Established problem, stable (1): Dementia              Established problem, stable (1): Dysphagia                Risk:  High:              Chronic Illness with severe exacerbation/progression: Dementia, now w/ dysphagia requiring PEG

## 2020-05-01 NOTE — PROGRESS NOTES
NUTRITION ASSESSMENT  Admission Date: 4/27/2020     Type and Reason for Visit: Reassess    NUTRITION RECOMMENDATIONS:     **NPO with new PEG placement, monitor ability to start feeds following procedure 5/2    ENTERAL NUTRITION  1. Recommend order \"Diet: Tube feed continuous/ NPO\". Initiate Jevity 1.5 formula at 20 mL/hr and as tolerated, increase by 10 mL/hr q 4 hours until goal of 70 mL/hr is met per PEG. 2. Suggest 200 mL H20 q 4 hours for 100% fluid recommendations when no IVF. 3. Monitor closely and correct lytes (K, Phos, Mg) before progressing TF to goal d/t risk of refeeding syndrome (hx extended inadequate nutritional intake). 4. Monitor for tolerance (bowel habits, N/V, cramping, abdominal distention). 5. Monitor for need to convert continuous feeding to nocturnal / prn with diet to encourage po intake, lifestyle. NUTRITION ASSESSMENT:  Patient pulled PEG out overnight 4/29 and it was replaced. 4/30. Pt has NG to LWS. Plans for UGI scope tomorrow am and possible initiation of tube feeds after procedure. WN recs provided, will monitor ability to start feeds and tolerance.      MALNUTRITION ASSESSMENT  Context: Chronic illness   Malnutrition Status: Mild Malnutrition(suspect mod/severe)  Findings of the 6 clinical characteristics of malnutrition (Minimum of 2 out of 6 clinical characteristics is required to make the diagnosis of moderate or severe Protein Calorie Malnutrition based on AND/ASPEN Guidelines):  Energy Intake %: Less than or equal to 50% of estimated energy requirement  Energy Intake Time: (x2 days)  Interpretation of Weight Loss %: 7.5% loss or greater  Interpretation of Weight Loss Time: (~3 weeks)  Body Fat Status: Severe subcutaneous fat loss  Body Fat Loss Location: Orbital  Muscle Mass Status: Severe muscle mass loss  Muscle Mass Loss Location: Temples (temporalis muscle)  Fluid Accumulation Status: No significant fluid accumulation  Reduced  Strength: Not measured    NUTRITION DIAGNOSIS  Problem: Severe Malnutrition  Etiology: Insufficient energy/nutrient consumption  Signs & Symptoms: Diet history of poor intake , Fat Loss  and Muscle loss     NUTRITION INTERVENTION  Food and/or Nutrient Delivery:Continue NPO or Start Tube Feeding   Nutrition education/counseling/coordination of care: Continue Inpatient Monitoring     NUTRITION MONITORING & EVALUATION:  Evaluation:No progress towards goal   Goals: Patient will tolerate EN to meet 100% of nutrition needs via PEG  Monitoring: Nutrition Progression  or Pertinent Labs      OBJECTIVE DATA:  · Nutrition-Focused Physical Findings: s/p PEG  · Wounds Surgical Wound      Past Medical History:   Diagnosis Date    Anxiety     BPH     CAD (coronary artery disease)     [facility] ([facility]     [facility]     Depression     Hearing loss     Hyperlipidemia     Hypertension     Sinusitis     Vertigo         ANTHROPOMETRICS  Current Height: 6' 2\" (188 cm)  Current Weight: 173 lb 15.1 oz (78.9 kg)    Admission weight: 188 lb (85.3 kg)  Ideal Bodyweight 190 lb (86 kg)   Weight Changes 7.5% wt loss ~3 weeks       BMI BMI (Calculated): 22.4    Wt Readings from Last 50 Encounters:   04/28/20 173 lb 15.1 oz (78.9 kg)   04/09/20 187 lb 9.8 oz (85.1 kg)   03/17/20 200 lb (90.7 kg)   03/09/20 200 lb (90.7 kg)   02/19/20 199 lb 12.8 oz (90.6 kg)   01/23/20 201 lb (91.2 kg)   01/13/20 204 lb 9.6 oz (92.8 kg)   08/29/19 215 lb 6.4 oz (97.7 kg)   05/31/19 219 lb 4.8 oz (99.5 kg)   03/13/19 220 lb (99.8 kg)   12/05/18 221 lb (100.2 kg)   06/19/18 221 lb (100.2 kg)   05/30/18 224 lb (101.6 kg)   04/24/18 222 lb (100.7 kg)   03/15/18 212 lb (96.2 kg)   01/23/18 209 lb (94.8 kg)   12/26/17 206 lb (93.4 kg)   12/12/17 207 lb (93.9 kg)   11/01/17 209 lb (94.8 kg)   10/12/17 205 lb 12.8 oz (93.4 kg)   05/02/17 204 lb (92.5 kg)   12/28/16 211 lb 12.8 oz (96.1 kg)   09/07/16 209 lb (94.8 kg)   05/12/16 211 lb 6.4 oz (95.9 kg)   04/05/16 210 lb 12.8 oz (95.6 kg)

## 2020-05-02 NOTE — PROGRESS NOTES
POC:    Reviewed UGI images. No extravasation of contrast however there is decreased gastric emptying. Will plan to continue G tube to gravity and NG tube to suction today. Will also increase Reglan to 5mg q6h. AXR at 2pm to assess contrast progression. Discussed with Dr. Adriano Matthews.     Abebe Caceres DO  05/02/20  11:34 AM  [phone]

## 2020-05-02 NOTE — PROGRESS NOTES
RESPIRATORY THERAPY ASSESSMENT    Name:  Kryst[address] Record Number:  [id number]  Age: 75 y.o. Gender: male  : 1945  Today's Date:  2020  Room:  [facility]5306-01    Assessment     Is the patient being admitted for a COPD or Asthma exacerbation? No   (If yes the patient will be seen every 4 hours for the first 24 hours and then reassessed)    Patient Admission Diagnosis: AMS, hypernatremia, dehydration      Allergies  No Known Allergies    Minimum Predicted Vital Capacity:     1292          Actual Vital Capacity:      Pt unable to perform vital capacity              Pulmonary History:No history  Home Oxygen Therapy:  room air  Home Respiratory Therapy:None   Current Respiratory Therapy:  HHN Duoneb Q4H w/a, O2 protocol  Treatment Type: HHN  Medications: Albuterol/Ipratropium    Respiratory Severity Index(RSI)   Patients with orders for inhalation medications, oxygen, or any therapeutic treatment modality will be placed on Respiratory Protocol. They will be assessed with the first treatment and at least every 72 hours thereafter. The following severity scale will be used to determine frequency of treatment intervention.     Smoking History: No Smoking History = 0    Social History  Social History     Tobacco Use    Smoking status: Never Smoker    Smokeless tobacco: Never Used   Substance Use Topics    Alcohol use: No    Drug use: No       Recent Surgical History: Lower Abdominal = 2  Past Surgical History  Past Surgical History:   Procedure Laterality Date    2009    GASTROSTOMY TUBE PLACEMENT N/A 2020    LAPAROSCOPIC GASTROSTOMY  TUBE PLACEMENT, ESOPHAGOGASTRODUODENOSCOPY performed by Naty Rivero DO at [address] [facility] N/A 2020    OPEN JANEWAY GASTROSTOMY, CLOSURE OF GASTROTOMY WITH INTRA ABDOMINAL OMENTAL FLAP, ENDOSCOPIC ASSISTED NASAL GASTRIC TUBE PLACEMENT performed by Naty Rivero DO at [address]       Level of Consciousness: Disoriented and Uncooperative = 2    Level of Activity: Mostly sedentary, minimal walking = 2    Respiratory Pattern: Regular Pattern; RR 8-20 = 0    Breath Sounds: Diminshed bilaterally and/or crackles = 2    Sputum  Sputum Color: Tan, Creamy, Red(slightly red tinged), Tenacity: Thick, Sputum How Obtained: Nasal tracheal, Suctioned  Cough: Strong, productive = 1    Vital Signs   /77   Pulse 73   Temp 99.9 °F (37.7 °C) (Axillary)   Resp 16   Ht 6' 2\" (1.88 m)   Wt 173 lb 15.1 oz (78.9 kg)   SpO2 92%   BMI 22.33 kg/m²   SPO2 (COPD values may differ): 88-89% on room air or greater than 92% on FiO2 28- 35% = 2    Peak Flow (asthma only): not applicable = 0    RSI: 58-11 = Q6H or QID and Q4HPRN for dyspnea        Plan       Goals: medication delivery and improve oxygenation    Patient/caregiver was educated on the proper method of use for Respiratory Care Devices:  Yes      Level of patient/caregiver understanding able to:   ? Verbalize understanding   ? Demonstrate understanding       ? Teach back        ? Needs reinforcement       ? No available caregiver               ? Other:     Response to education:  pt has advanced dementia and is non-verbal     Is patient being placed on Home Treatment Regimen? No     Does the patient have everything they need prior to discharge? NA     Comments: Chart reviewed. Pt has required NT suctioning twice tonight. Plan of Care: HHN QID and PRN/dyspnea, O2 protocol. Electronically signed by Esthela Robin RCP on 5/2/2020 at 3:35 AM    Respiratory Protocol Guidelines     1. Assessment and treatment by Respiratory Therapy will be initiated for medication and therapeutic interventions upon initiation of aerosolized medication. 2. Physician will be contacted for respiratory rate (RR) greater than 35 breaths per minute. Therapy will be held for heart rate (HR) greater than 140 beats per minute, pending direction from physician.   3. Bronchodilators will be administered via Metered Dose Inhaler (MDI) with spacer when the following criteria are met:  a. Alert and cooperative     b. HR < 140 bpm  c. RR < 30 bpm                d. Can demonstrate a 2-3 second inspiratory hold  4. Bronchodilators will be administered via Hand Held Nebulizer [facility]) to patients when ANY of the following criteria are met  a. Incognizant or uncooperative          b. Patients treated with HHN at Home        c. Unable to demonstrate proper use of MDI with spacer     d. RR > 30 bpm   5. Bronchodilators will be delivered via Metered Dose Inhaler (MDI), HHN, Aerogen to intubated patients on mechanical ventilation. 6. Inhalation medication orders will be delivered and/or substituted as outlined below. Aerosolized Medications Ordering and Administration Guidelines:    1. All Medications will be ordered by a physician, and their frequency and/or modality will be adjusted as defined by the patients Respiratory Severity Index (RSI) score. 2. If the patient does not have documented COPD, consider discontinuing anticholinergics when RSI is less than 9.  3. If the bronchospasm worsens (increased RSI), then the bronchodilator frequency can be increased to a maximum of every 4 hours. If greater than every 4 hours is required, the physician will be contacted. 4. If the bronchospasm improves, the frequency of the bronchodilator can be decreased, based on the patient's RSI, but not less than home treatment regimen frequency. 5. Bronchodilator(s) will be discontinued if patient has a RSI less than 9 and has received no scheduled or as needed treatment for 72  Hrs. Patients Ordered on a Mucolytic Agent:    1. Must always be administered with a bronchodilator. 2. Discontinue if patient experiences worsened bronchospasm, or secretions have lessened to the point that the patient is able to clear them with a cough. Anti-inflammatory and Combination Medications:    1.  If the patient lacks prior history of lung disease, is not using inhaled anti-inflammatory medication at home, and lacks wheezing by examination or by history for at least 24 hours, contact physician for possible discontinuation.

## 2020-05-02 NOTE — PROGRESS NOTES
Pt non verbal but open eyes when name called. VSS however HR has been fluctuating from 70-100s with a few times jumped to 110-120s for a second or two. RN had a hard time locate P wave on tele and pt also had a 15 bts of Vtach tonight so EKG obtained per hospitalist dr Kingsley Buchanan. Pt previous bilat wrist restrains were  and removed at , pt noticed did not attempt to fight back or do harm to self or others, hospitalist dr David Grant was then followed up and made aware of pt's situation, MD made a decision to reorder restrain, pt is placed on restrain at [phone]. Pt is receiving potassium chloride per MDs d/t K 2.4. pt is resting in bed w bed alarm on. NG suctioning to wall, condom cath and G tube to gravity in place. Will continue to monitor. Ps: pt wife Buffy Coughlin called, updates given.

## 2020-05-02 NOTE — PROGRESS NOTES
Hospitalist Progress Note      PCP: Orin Gil MD    Date of Admission: 4/27/2020    Chief Complaint: encephalopathy    Subjective:  Patient seen and examined at the bedside. No complaints at this time. PEG tube in place. Plan for upper endoscopy today and if ok then will start TF. PFHS: reviewed as documented 4/27/2020, no changes    Medications:  Reviewed    Infusion Medications    sodium chloride 75 mL/hr at 05/01/20 2046     Scheduled Medications    ipratropium-albuterol  1 ampule Inhalation 4x daily    metoclopramide  5 mg Intravenous Q8H    enoxaparin  40 mg Subcutaneous Daily     PRN Meds: ipratropium-albuterol, HYDROmorphone **OR** HYDROmorphone, promethazine **OR** ondansetron, magnesium sulfate      Intake/Output Summary (Last 24 hours) at 5/2/2020 0809  Last data filed at 5/2/2020 0324  Gross per 24 hour   Intake 25 ml   Output 3400 ml   Net -3375 ml         Physical Exam Performed:    /77   Pulse 73   Temp 99.9 °F (37.7 °C) (Axillary)   Resp 16   Ht 6' 2\" (1.88 m)   Wt 173 lb 15.1 oz (78.9 kg)   SpO2 93%   BMI 22.33 kg/m²     General appearance:  No apparent distress, appears stated age  Eyes: Pupils equal, round, reactive to light, conjunctiva/corneas clear  Ears/Nose/Mouth/Throat: No external lesions or scars, hearing intact to voice  Neck: Trachea midline, no masses noted  Respiratory:  Normal respiratory effort. Clear to auscultation bilaterally  Cardiovascular: Regular rate and rhythm, nl S1/S2, w/o murmurs, rubs or gallops, no lower extremity edema  Abdomen: Soft, non-tender, non-distended, no hepatosplenomegaly  Musculoskeletal: No cyanosis, clubbing or petechiae, no lower extremity misalignment, asymmetry, or crepitation  Skin: Normal skin color, texture, turgor. No rashes or lesions noted.   Psychiatric: Encephalopathic, good insight and judgment    Labs:   Recent Labs     04/30/20  0437 05/01/20  0501 05/02/20  0414   WBC 9.4 7.7 5.1   HGB 12.0* 11.4* 12.1*   HCT atelectasis, otherwise unremarkable  -Clinically not volume overloaded    # Acute metabolic encephalopathy  -Multifactorial, low Na, dehydration, dementia, NENA    # Hypernatremia  -Due to dehydration  -Continue IVF, follow electrolytes     # NENA  -pre-renal from dehydration  -improving, Continue IVF    # Elevated troponin  -No EKG changes     # Advanced dementia  # Dysphagia due to advanced dementia  -PEG tube placement yesterday  -Upper endoscopy today, then if ok will restart tube feeds and PO meds    DVT Prophylaxis: Lovenox  Diet: Diet NPO Effective Now  Code Status: Full Code    PT/OT Eval Status: n/a    Dispo - Isrrael Bell MD    # Medical Decision Making Complexity: high     Problem              Established problem, stable (1): NENA              Established problem, stable (1): Encephalopathy              Established problem, stable (1): Dementia              Established problem, stable (1): Dysphagia     Risk:  High:              Chronic Illness with severe exacerbation/progression: Dementia, now w/ dysphagia requiring PEG

## 2020-05-02 NOTE — PROGRESS NOTES
General Surgery  Daily Progress Note    CC: Malnutrition    Subjective:   No acute events overnight. Patient remains non-verbal.     Objective:  Exam:  Vitals:    05/01/20 2047 05/01/20 2335 05/02/20 0220 05/02/20 0324   BP:  (!) 149/96  135/77   Pulse:  80  73   Resp: 17 16 16 16   Temp:  99.6 °F (37.6 °C)  99.9 °F (37.7 °C)   TempSrc:  Axillary  Axillary   SpO2:  93% (!) 89% 92%   Weight:       Height:         General Appearance: alert, no acute distress  Chest/Lungs: normal effort, 4L supplemental oxygen via nasal cannula, symmetric chest rise  Cardiovascular: Regular rate and rhythm, mildly hypertensive  Abdomen: soft, appropriately tender, surgical dressing c/d/i; G tube to drainage bag with gastric output (~1.5L over the past 24 hours)  Extremities: no edema, no cyanosis  Neuro: nonverbal    Assessment and Plan: This is a 75y.o. year old male s/p laparoscopic assisted PEG placement secondary to malnutrition. Required return to OR for [facility] gastrostomy 4/30.  POD 2    - No meds per tube at this time    - Continue G tube gravity and NG tube to suction  - Continue reglan 5mg q8h  - UGI this AM     - If negative, will plan to begin tube feeds at 10mL and continue at a rate of 10 for today   - Patient to remain in restraints due to pulling at lines/tubes resulting in take-back to OR  - Earliest date of discharge 5/3  - Medical management per primary team     Paresh Ho MD, MPH  PGY-1 General Surgery  05/02/20  6:46 AM  [phone]

## 2020-05-02 NOTE — PROGRESS NOTES
GI Progress Note    Lucio Carrillo is a 75 y.o. male patient. 1. Altered mental status, unspecified altered mental status type    2. Dehydration    3. Hypernatremia        Admit Date: 2020    Subjective:       No major cahange  Await feeding per surgery  Patient is non verbal  Discussed with pcp        Scheduled Meds:   ipratropium-albuterol  1 ampule Inhalation 4x daily    metoclopramide  5 mg Intravenous Q6H    enoxaparin  40 mg Subcutaneous Daily       Continuous Infusions:   sodium chloride 75 mL/hr at 20       PRN Meds:  ipratropium-albuterol, acetaminophen, HYDROmorphone **OR** HYDROmorphone, promethazine **OR** ondansetron, magnesium sulfate      Objective:       Patient Vitals for the past 24 hrs:   BP Temp Temp src Pulse Resp SpO2   20 1301 -- 101.6 °F (38.7 °C) Axillary -- -- --   20 1115 123/71 102.7 °F (39.3 °C) Axillary 72 16 --   20 0834 123/66 98.5 °F (36.9 °C) Axillary -- 16 90 %   20 0802 -- -- -- -- -- 93 %   20 0324 135/77 99.9 °F (37.7 °C) Axillary 73 16 92 %   200 -- -- -- -- 16 (!) 89 %   20 2335 (!) 149/96 99.6 °F (37.6 °C) Axillary 80 16 93 %   20 -- -- -- -- 17 --   20 -- -- -- 79 -- --   20 (!) 144/72 99.3 °F (37.4 °C) Axillary 97 16 92 %   20 1421 137/66 98.1 °F (36.7 °C) Axillary 76 16 97 %       Exam:    VITALS:  /71   Pulse 72   Temp 101.6 °F (38.7 °C) (Axillary)   Resp 16   Ht 6' 2\" (1.88 m)   Wt 173 lb 15.1 oz (78.9 kg)   SpO2 90%   BMI 22.33 kg/m²   TEMPERATURE:  Current - Temp: 101.6 °F (38.7 °C);  Max - Temp  Av °F (37.8 °C)  Min: 98.1 °F (36.7 °C)  Max: 102.7 °F (39.3 °C)    NAD  General appearance: alert, appears stated age, cooperative and no distress  Head: Normocephalic, without obvious abnormality, atraumatic  Neck: supple, symmetrical, trachea midline and thyroid not enlarged, symmetric, no tenderness/mass/nodules  CVS:  RRR, Nl s1s2  Lungs CTA

## 2020-05-03 NOTE — SIGNIFICANT EVENT
Significant Event Note  -------------------------------  Rapid response called at approximately 1341 for HR sustaining in 120s as high as 140s. At bedside pt tachycardic to 120s, not hypotensive, afib on monitor. Renal panel and magnesium ordered, EKG ordered. Patient has hx afib, not on rate control meds. PRN metoprolol ordered for rate control. On telemetry.

## 2020-05-03 NOTE — PROGRESS NOTES
GI Progress Note    Lucy Lu is a 75 y.o. male patient. 1. Altered mental status, unspecified altered mental status type    2. Dehydration    3. Hypernatremia    4. Weight loss    Admit Date: 2020    Subjective:       Non verbal  Started on tube feeding  No major issues        Scheduled Meds:   ipratropium-albuterol  1 ampule Inhalation TID    metoclopramide  5 mg Intravenous Q6H    enoxaparin  40 mg Subcutaneous Daily       Continuous Infusions:   sodium chloride 75 mL/hr at 20       PRN Meds:  metoprolol, ipratropium-albuterol, acetaminophen, HYDROmorphone **OR** HYDROmorphone, promethazine **OR** ondansetron, magnesium sulfate      Objective:       Patient Vitals for the past 24 hrs:   BP Temp Temp src Pulse Resp SpO2   20 1538 -- -- -- -- -- 94 %   20 1523 138/80 -- -- 73 -- --   20 1459 132/68 -- -- 122 -- --   20 1432 132/78 97.1 °F (36.2 °C) Axillary 126 16 95 %   20 1359 110/64 -- -- 133 -- --   20 1235 139/76 97.7 °F (36.5 °C) Axillary 60 16 95 %   20 0857 135/69 97.5 °F (36.4 °C) Axillary 52 16 95 %   20 0813 -- -- -- -- -- (!) 86 %   20 0237 123/65 98.9 °F (37.2 °C) Oral 64 16 96 %   20 2315 117/71 99.5 °F (37.5 °C) Oral 64 16 97 %   20 2030 129/80 99.3 °F (37.4 °C) Oral 84 16 100 %   20 1940 -- -- -- -- 16 92 %       Exam:    VITALS:  /80   Pulse 73   Temp 97.1 °F (36.2 °C) (Axillary)   Resp 16   Ht 6' 2\" (1.88 m)   Wt 173 lb 15.1 oz (78.9 kg)   SpO2 94%   BMI 22.33 kg/m²   TEMPERATURE:  Current - Temp: 97.1 °F (36.2 °C);  Max - Temp  Av.3 °F (36.8 °C)  Min: 97.1 °F (36.2 °C)  Max: 99.5 °F (37.5 °C)      Head: Normocephalic, without obvious abnormality, atraumatic  Neck: supple, symmetrical, trachea midline and thyroid not enlarged, symmetric, no tenderness/mass/nodules  CVS:  RRR, Nl s1s2  Lungs CTA Bilaterally, normal effort  Abdomen: soft, non-tender; bowel sounds normal; no masses,  no organomegaly. Gt note  Extremities: No edema. Lab Data:  Recent Labs     05/01/20  0501 05/02/20  0414 05/03/20  0531   WBC 7.7 5.1 7.7   HGB 11.4* 12.1* 10.2*   HCT 34.4* 35.9* 30.8*   MCV 91.8 90.2 90.5   * 145 146     Recent Labs     05/02/20  0414  05/03/20  0531 05/03/20  1027 05/03/20  1451   *   < > 143 143 142  145  143   K 4.4   < > 4.2 4.0 4.0  4.0      < > 105 105 104  105   CO2 28   < > 32 31 27  27   PHOS 3.3  --  2.8  --  2.5   BUN 17  --  17  --  17   CREATININE 1.2  --  1.2  --  1.1    < > = values in this interval not displayed. No results for input(s): AST, ALT, ALB, BILIDIR, BILITOT, ALKPHOS in the last 72 hours. No results for input(s): LIPASE, AMYLASE in the last 72 hours. No results for input(s): PROT, INR in the last 72 hours. No results for input(s): PTT in the last 72 hours. No results for input(s): OCCULTBLD in the last 72 hours. Assessment:       Active Problems:    Hypernatremia    Altered mental status    Severe protein-calorie malnutrition ([address]    Dislodged gastrostomy tube (HCC)  Resolved Problems:    * No resolved hospital problems.  *  advanced dementia  Weight loss    Recommendations:       Continue tube feeding as ordered    Kandy Lemons MD  5/3/2020  6:05 PM

## 2020-05-03 NOTE — PROGRESS NOTES
[facility]  General Surgery      Spoke with patients wife and updated her on his progress.     Ariana Martin DO PGY1  General Surgery Resident  05/03/20 5:42 PM  [phone]

## 2020-05-03 NOTE — PROGRESS NOTES
Hospitalist Progress Note      PCP: Bri Tyson MD    Date of Admission: 2020    Chief Complaint: encephalpathy    HPI per admitting physician: \"This is a 27-year-old male with history of advanced dementia, hypertension, CAD status post stenting who presented from home due to mental status changes and unable to eat and drink. Patient was recently discharged from the hospital on 4/15. Patient is encephalopathic on my evaluation so most of the history was taken from ED and discussion with wife over the phone.     Wife reported that patient has been progressively declining but for about a week or so he has a stop eating and drinking she also reported that patient has advanced dementia so he cannot keep himself hydrated. She reported that the talk to their PCP was recommending PEG tube placement.     Wife denied any fever, chills, shortness of breath, nausea, vomiting at home.     Discussed with wife regarding hospice due to advanced dementia and continues declining in patients health. Wife reported that she has been in contact with hospice agency and patient will be transitioned to hospice after discharge. \"    Hospital Course:  PEG tube was placed. Patient subsequently pulled this out which required repeat placement as well as closure/patch of old site. Upper GI  showed no extravasation of contrast but delayed gastric emptying so TF will stay at 10ml today and no meds per tube. Subjective:  Patient seen and examined at the bedside. Upper GI yesterday, still no meds per tube, TF at 10, will stay there for now per surgery. No change in clinical status today.     PFHS: reviewed as documented 2020, no changes      Medications:  Reviewed    Infusion Medications    sodium chloride 75 mL/hr at 20     Scheduled Medications    ipratropium-albuterol  1 ampule Inhalation 4x daily    metoclopramide  5 mg Intravenous Q6H    enoxaparin  40 mg Subcutaneous Daily     PRN Meds: ipratropium-albuterol, acetaminophen, HYDROmorphone **OR** HYDROmorphone, promethazine **OR** ondansetron, magnesium sulfate      Intake/Output Summary (Last 24 hours) at 5/3/2020 0824  Last data filed at 5/3/2020 4275  Gross per 24 hour   Intake 30 ml   Output 2250 ml   Net -2220 ml         Physical Exam Performed:    /65   Pulse 64   Temp 98.9 °F (37.2 °C) (Oral)   Resp 16   Ht 6' 2\" (1.88 m)   Wt 173 lb 15.1 oz (78.9 kg)   SpO2 (!) 86% Comment: increased to 4  BMI 22.33 kg/m²     General appearance:  No apparent distress, appears stated age  Eyes: Pupils equal, round, reactive to light, conjunctiva/corneas clear  Ears/Nose/Mouth/Throat: No external lesions or scars, hearing intact to voice  Neck: Trachea midline, no masses noted  Respiratory:  Normal respiratory effort. Clear to auscultation bilaterally  Cardiovascular: Regular rate and rhythm, nl S1/S2, w/o murmurs, rubs or gallops, no lower extremity edema  Abdomen: Soft, non-tender, non-distended, no hepatosplenomegaly  Musculoskeletal: No cyanosis, clubbing or petechiae, no lower extremity misalignment, asymmetry, or crepitation  Skin: Normal skin color, texture, turgor. No rashes or lesions noted. Psychiatric: Encephalopathic, good insight and judgment    Labs:   Recent Labs     05/01/20  0501 05/02/20  0414 05/03/20  0531   WBC 7.7 5.1 7.7   HGB 11.4* 12.1* 10.2*   HCT 34.4* 35.9* 30.8*   * 145 146     Recent Labs     05/01/20  2036 05/02/20  0414  05/02/20  1651 05/02/20  2309 05/03/20  0531   *  148* 146*   < > 146*  143 140 143   K 2.4*  2.4* 4.4   < > 4.2 4.0 4.2   *  119* 105   < > 102 101 105   CO2 19*  20* 28   < > 30 29 32   BUN 12 17  --   --   --  17   CREATININE 0.6* 1.2  --   --   --  1.2   CALCIUM 5.1* 8.7  --   --   --  8.2*   PHOS 1.4* 3.3  --   --   --  2.8    < > = values in this interval not displayed. No results for input(s): AST, ALT, BILIDIR, BILITOT, ALKPHOS in the last 72 hours.   No results for input(s): INR in the last 72 hours. No results for input(s): Zhane Mccormicks in the last 72 hours. Urinalysis:      Lab Results   Component Value Date    NITRU Negative 05/03/2020    WBCUA 11-20 05/03/2020    BACTERIA 4+ 05/03/2020    RBCUA 3-4 05/03/2020    BLOODU TRACE-INTACT 05/03/2020    SPECGRAV 1.020 05/03/2020    GLUCOSEU Negative 05/03/2020       Radiology:  XR ABDOMEN (KUB) (SINGLE AP VIEW)   Final Result      1. No contrast identified in the stomach or abdomen. The pelvis was not included in this exam and repeat study including the pelvis is recommended to evaluate for possible progression of contrast.  The lack of contrast in this exam raises concern for    possible interval removal of the contrast from the stomach through the patient's indwelling NG tube rather than progression to bowel loops. Correlate clinically. FL UGI   Final Result      LIMITED UPPER GI DUE TO POSTSURGICAL STATUS AND PATIENT'S DECREASED ABILITY TO COOPERATE      Evidence of an omental or surgical patch in the stomach with no sign of any leakage of contrast material. There is delayed transit through the stomach into the duodenal cap and suboptimal assessment of the small bowel on this limited study. KUB overhead    films obtained at 1 hour demonstrate activity in the duodenal cap, proximal portion of the small bowel but not within the remainder of the duodenum or jejunum      No extravasation with small bowel ileus noted. G-tube remains in place in satisfactory position without leak around the G-tube      XR CHEST PORTABLE   Final Result      1. Minimal bibasilar atelectasis, otherwise clear lungs. 2.  NG tube looped in the upper stomach. XR CHEST PORTABLE   Final Result      No acute cardiopulmonary disease      CT ABDOMEN PELVIS WO CONTRAST Additional Contrast? None   Final Result   1. No acute abnormality of the abdomen or pelvis.    2. Elevation of the left hemidiaphragm, unchanged from prior studies. 3. Multiple gas opacified loops of large and small bowel are again noted. No obstruction. Transverse colon is interposed between the stomach and anterior abdominal wall. CT HEAD WO CONTRAST   Final Result      1. NO ACUTE INTRACRANIAL ABNORMALITY. Assessment/Plan:    Active Hospital Problems    Diagnosis Date Noted    Dislodged gastrostomy tube ([facility] [Z43.1]     Altered mental status [R41.82]     Severe protein-calorie malnutrition ([facility] [E43]     Hypernatremia [E87.0] 04/27/2020       Plan:    # Acute metabolic encephalopathy  -Multifactorial, low Na, dehydration, dementia, NENA     # Hypernatremia  -Due to dehydration  -Continue IVF, follow electrolytes     # NENA  -pre-renal from dehydration  -improving, Continue IVF     # Elevated troponin  -No EKG changes     # Advanced dementia  # Dysphagia due to advanced dementia  -upper endoscopy yesterday showed delayed gastric emptying  -TF at 10ml today, do not advance, no meds per tube  -remains in restraints    DVT Prophylaxis: Lovenox  Diet: DIET TUBE FEED CONTINUOUS/CYCLIC NPO; 1.5 Calorie with Fiber (Jevity 1.5);  Gastrostomy; Continuous; 10; 10; 24  Code Status: Full Code    PT/OT Eval Status: n/a    Dispo - Micky Kline MD    # Medical Decision Making Complexity: high     Problem              Established problem, stable (1): NENA              Established problem, stable (1): Encephalopathy              Established problem, stable (1): Dementia              Established problem, stable (1): Dysphagia     Risk:  High:              Chronic Illness with severe exacerbation/progression: Dementia, now w/ dysphagia requiring PEG

## 2020-05-03 NOTE — PROGRESS NOTES
Patient vss with exception of 4L of O2. Patient is nonverbal. Patient opens eyes to voice. Patient has restraints in place for pt protection. Patient has IV fluids running. NG in his R nare at 75 is clamped. Patient has Jevity 1.5 tube feeds running. Condom cath in place and draining adequately. Patient being turned every 2 hours. ROM performed on patient. Personal hygiene done this morning. Will continue to monitor.

## 2020-05-03 NOTE — PROGRESS NOTES
RESPIRATORY THERAPY ASSESSMENT    Name:  [address] Record Number:  [id number]  Age: 75 y.o. Gender: male  : 1945  Today's Date:  5/3/2020  Room:  5305306-01    Assessment     Is the patient being admitted for a COPD or Asthma exacerbation? No   (If yes the patient will be seen every 4 hours for the first 24 hours and then reassessed)    Patient Admission Diagnosis      Allergies  No Known Allergies        Pulmonary History: none  Home Oxygen Therapy: none  Home Respiratory Therapy: none   Current Respiratory Therapy:  Albuterol/atrovent QID  Treatment Type: HHN  Medications: Albuterol/Ipratropium    Respiratory Severity Index(RSI)   Patients with orders for inhalation medications, oxygen, or any therapeutic treatment modality will be placed on Respiratory Protocol. They will be assessed with the first treatment and at least every 72 hours thereafter. The following severity scale will be used to determine frequency of treatment intervention.     Smoking History: No Smoking History = 0    Social History  Social History     Tobacco Use    Smoking status: Never Smoker    Smokeless tobacco: Never Used   Substance Use Topics    Alcohol use: No    Drug use: No       Recent Surgical History: Lower Abdominal = 1  Past Surgical History  Past Surgical History:   Procedure Laterality Date    2009    GASTROSTOMY TUBE PLACEMENT N/A 2020    LAPAROSCOPIC GASTROSTOMY  TUBE PLACEMENT, ESOPHAGOGASTRODUODENOSCOPY performed by Toney Montanez DO at [address] [place] [address] N/A 2020    OPEN JANEWAY GASTROSTOMY, CLOSURE OF GASTROTOMY WITH INTRA ABDOMINAL OMENTAL FLAP, ENDOSCOPIC ASSISTED NASAL GASTRIC TUBE PLACEMENT performed by Toney Montanez DO at [facility] OR       Level of Consciousness: Disoriented and Uncooperative = 2    Level of Activity: Non weight bearing- transfers bed to chair only = 3    Respiratory Pattern: Regular Pattern; RR 8-20 = 0    Breath Sounds: d. Can demonstrate a 2-3 second inspiratory hold  4. Bronchodilators will be administered via Hand Held Nebulizer [facility]3]) to patients when ANY of the following criteria are met  a. Incognizant or uncooperative          b. Patients treated with HHN at Home        c. Unable to demonstrate proper use of MDI with spacer     d. RR > 30 bpm   5. Bronchodilators will be delivered via Metered Dose Inhaler (MDI), HHN, Aerogen to intubated patients on mechanical ventilation. 6. Inhalation medication orders will be delivered and/or substituted as outlined below. Aerosolized Medications Ordering and Administration Guidelines:    1. All Medications will be ordered by a physician, and their frequency and/or modality will be adjusted as defined by the patients Respiratory Severity Index (RSI) score. 2. If the patient does not have documented COPD, consider discontinuing anticholinergics when RSI is less than 9.  3. If the bronchospasm worsens (increased RSI), then the bronchodilator frequency can be increased to a maximum of every 4 hours. If greater than every 4 hours is required, the physician will be contacted. 4. If the bronchospasm improves, the frequency of the bronchodilator can be decreased, based on the patient's RSI, but not less than home treatment regimen frequency. 5. Bronchodilator(s) will be discontinued if patient has a RSI less than 9 and has received no scheduled or as needed treatment for 72  Hrs. Patients Ordered on a Mucolytic Agent:    1. Must always be administered with a bronchodilator. 2. Discontinue if patient experiences worsened bronchospasm, or secretions have lessened to the point that the patient is able to clear them with a cough. Anti-inflammatory and Combination Medications:    1.  If the patient lacks prior history of lung disease, is not using inhaled anti-inflammatory medication at home, and lacks wheezing by examination or by history for at least 24 hours, contact physician for possible discontinuation.

## 2020-05-03 NOTE — PLAN OF CARE
Problem: Falls - Risk of:  Goal: Will remain free from falls  Description: Will remain free from falls  5/3/2020 1016 by Win Cheema RN  Outcome: Ongoing  Note: Patient is in bed with bed alarm on. Patient on camera for safety. Patient has restraint in place. Bed is in the lowest position. Will continue to monitor. Problem: Fluid Volume:  Goal: Ability to achieve a balanced intake and output will improve  Description: Ability to achieve a balanced intake and output will improve  Outcome: Ongoing  Note: Patient is NPO. IV fluids running at 75 mls. Tube feeds started this morning. Will continue to monitor. Problem: Restraint Use - Nonviolent/Non-Self-Destructive Behavior:  Goal: Absence of restraint indications  Description: Absence of restraint indications  5/3/2020 1016 by Win Cheema RN  Outcome: Ongoing  Note: Patient in restraints. Checks every hour. ROM performed on patient. Patient repositioned every 2 hours. Patient lines disguised. Will continue to monitor.

## 2020-05-03 NOTE — PROGRESS NOTES
Update was given to Sonia Ramirez, pt wife, she was given the chance to speak to surgical team as well by bedside. Questions answered  Pt bilateral wrist restraints are reordered at [phone] for 24h continuous.    Will continue to monitor

## 2020-05-03 NOTE — PROGRESS NOTES
Nurse called rapid response due to patient sustaining HR in 140's. Rapid team showed up and ordered metoprolol. After rechecking patient BP. BP was too low to give metoprolol. LR bolus order and IV digoxin given. After 15 minutes patient BP was elevated but patient HR was still sustaining in high 120's low 130's. Patient given 5 mg metoprolol. Will continue to monitor pt HR and BP.

## 2020-05-03 NOTE — PROGRESS NOTES
General Surgery  Daily Progress Note    CC: Malnutrition    Subjective:   Patient underwent upper GI yesterday, which showed delay of gastric emptying, no extrav from omental patch. Patient remained without trophic tube feeds. Tmax of 102.7 yesterday - UA negative, blood cultures pending. Objective:  Exam:  Vitals:    05/02/20 1940 05/02/20 2030 05/02/20 2315 05/03/20 0237   BP:  129/80 117/71 123/65   Pulse:  84 64 64   Resp: 16 16 16 16   Temp:  99.3 °F (37.4 °C) 99.5 °F (37.5 °C) 98.9 °F (37.2 °C)   TempSrc:  Oral Oral Oral   SpO2: 92% 100% 97% 96%   Weight:       Height:         General Appearance: alert, no acute distress  Chest/Lungs: normal effort, 2L supplemental oxygen via nasal cannula, symmetric chest rise  Cardiovascular: Regular rate and rhythm, normotensive  Abdomen: soft, appropriately tender, surgical dressing c/d/i; G tube to drainage bag with bilious output   Extremities: no edema, no cyanosis  Neuro: nonverbal    Assessment and Plan: This is a 75y.o. year old male s/p laparoscopic assisted PEG placement secondary to malnutrition. Required return to OR for [address] gastrostomy 4/30. POD 3    - No meds per tube at this time    - Clamp NG this AM  - Continue reglan 5mg q6h  - will start trophic tube feeds today at 10mL/hr - will stay at 10 today  - check residuals Q4H, hold TF for residual greater than 200  - Patient to remain in restraints due to pulling at lines/tubes resulting in take-back to OR  - discharge likely tomorrow   - Medical management per primary team     Opal Chappell DO  PGY1, General Surgery  05/03/20  6:46 AM    I am post-call and off campus today.  If you have any questions or concerns regarding this patient's care today, please page:   Clifford Townsend DO PGY-1 [phone]

## 2020-05-04 NOTE — PROGRESS NOTES
Patient is unable to effectively communicate needs; full oral care was performed; q2h turns with foam; O2 at 1-2L, pulse ox at 92-95; VSS except for HR, drops to 49-70, A-fib noted on tele, MD notified at bedside; quick HR spike to 170 at approximately 0900, but not sustained; MD notified; patient had no residual TF while at 10ml/hr; increased to 20ml/hr, with 5ml residual; increased to 30ml/hr, continuing to monitor to goal of 70ml/hr; patient appeared to be in pain, see flowsheet; bowel sounds 4Q; voiding through condom cath; compliant with all medications; abdominal binder in place; video monitor in use; shows no signs of aggression; will continue to monitor.         Electronically signed by Jann Wheeler RN on 5/4/20 at 2:21 PM EDT

## 2020-05-04 NOTE — PROGRESS NOTES
POC:     NG tube is removed without complication.      Jes Huynh MD  General Surgery Resident  05/04/20 8:32 AM  [phone]

## 2020-05-04 NOTE — PROGRESS NOTES
Tube feed increased 10ml/hr to total 20ml/hr as of note    Will monitor      Electronically signed by Aga Garcia RN on 5/4/20 at 8:07 AM EDT

## 2020-05-04 NOTE — PROGRESS NOTES
Surgery Daily Progress Note  Vikki Ascencio  CC:  Malnutrition      Subjective : No acute events overnight. Patient has remained afebrile. Received HHN treatment last evening for low 02 sat. Remains on 2 LPM/nc      Objective    Infusions:   sodium chloride 75 mL/hr at 05/03/20 2320        I/O:I/O last 3 completed shifts: In: 1452.4 [I.V.:1325.4; NG/GT:127]  Out: 1125 [Urine:750; Emesis/NG output:375]           Wt Readings from Last 1 Encounters:   04/28/20 173 lb 15.1 oz (78.9 kg)                 LABS:    Recent Labs     05/03/20  0531 05/04/20  0434   WBC 7.7 6.0   HGB 10.2* 10.1*   HCT 30.8* 30.5*   MCV 90.5 91.3    148        Recent Labs     05/03/20  1451 05/03/20  2318 05/04/20  0434     145  143 142 145   K 4.0  4.0 3.8 3.9     105 104 106   CO2 27  27 27 28   PHOS 2.5  --  3.1   BUN 17  --  19   CREATININE 1.1  --  1.0      No results for input(s): AST, ALT, ALB, BILIDIR, BILITOT, ALKPHOS in the last 72 hours. No results for input(s): LIPASE, AMYLASE in the last 72 hours. No results for input(s): PROT, INR, APTT in the last 72 hours. No results for input(s): CKTOTAL, CKMB, CKMBINDEX, TROPONINI in the last 72 hours. Exam:/69   Pulse 66   Temp 97.6 °F (36.4 °C) (Axillary)   Resp 18   Ht 6' 2\" (1.88 m)   Wt 173 lb 15.1 oz (78.9 kg)   SpO2 99%   BMI 22.33 kg/m²   General appearance: alert, appears stated age and cooperative  Lungs: clear to auscultation bilaterally  Heart: regular rate and rhythm, S1, S2   Abdomen: soft, appropriately-tender;. Incision d/d/i.  G tube to tube feeds      ASSESSMENT/PLAN: Pt. is a 75 y.o. male s/p laparoscopic assisted PEG placement secondary to malnutrition.  Required return to OR for [address] gastrostomy 4/30    - patient remains on TF at 10 cc/hr - will slowly continue to increase to goal as long as residuals are less than 200 cc  - continue Reglan  - Colace liquid BID through G tube  - Follow up blood cultures  - continue aggressive pulmonary care  - Oral care per nursing  -will discuss with staff removal of NGT  - medical management per medicine        Camron Moon 5/4/2020 6:21 AM  [phone]

## 2020-05-04 NOTE — CARE COORDINATION
Spoke with Michaela Jones from [facility]. It doesn't like look his insurance will cover his actual tube feed, but will cover all the supplies. Pt is only at 20mL/Hr of a goal of 70. Asked wife if she needed a lift for home and she thinks she will. Called and spoke with Nickolas at [facility] and he will contact wife. Pts wife also wants updates from both surgery and medicine, paged both to update her. No PT/OT orders in place. Wife wants to know if the doctors think he would be skillable. Will continue to follow till DC.  Electronically signed by Giana Plata RN on 5/4/2020 at 10:14 AM

## 2020-05-04 NOTE — PROGRESS NOTES
Pt SPO2 low to 70s. The rest of VS within normal limits. RT called,breathing treatment given,oral suction performed and High flow O2 initiated: 6l/min. SPO2 rechecked 100%.  Will continue to monitor

## 2020-05-04 NOTE — PLAN OF CARE
Problem: Falls - Risk of:  Goal: Will remain free from falls  Description: Will remain free from falls  5/4/2020 1409 by Debby Menon, RN  Outcome: Met This Shift  Note: Call light within reach; bed alarm engaged; increased frequency of rounds; bed in lowest position   5/4/2020 0631 by Jaron Scruggs, RN  Outcome: Ongoing  Note: Patient remains free from physical injury. Fall precautions in place: Patient in bed lowest position,wheels locked. 2/4 side rails up, alarm activated. Call light and beside table within reach.  Will continue to monitor    5/4/2020 3667 by Jaron Scruggs, RN  Outcome: Ongoing

## 2020-05-04 NOTE — PROGRESS NOTES
Tube feed residuals less than 5ml    Increased to 30ml/hr    Will monitor     Electronically signed by Rm Acosta RN on 5/4/20 at 12:53 PM EDT

## 2020-05-04 NOTE — PROGRESS NOTES
POC:     Wife, Reshma York ([phone]) is updated regarding to his G tube and tube feeding plan.  Will defer dispo planning (SNF vs home with hospice) per primary team.    Darren Aldrich MD  General Surgery Resident  05/04/20 10:35 AM  [phone]

## 2020-05-04 NOTE — PLAN OF CARE
Problem: Gas Exchange - Impaired:  Goal: Levels of oxygenation will improve  Description: Levels of oxygenation will improve  Outcome: Ongoing  Note: Patient on high flow O2 2l/min. SPO2 maintained above 90%. Will continue to monitor        Problem: Falls - Risk of:  Goal: Will remain free from falls  Description: Will remain free from falls  5/4/2020 0631 by Willis Holm RN  Outcome: Ongoing  Note: Patient remains free from physical injury. Fall precautions in place: Patient in bed lowest position,wheels locked. 2/4 side rails up, alarm activated. Call light and beside table within reach.  Will continue to monitor

## 2020-05-04 NOTE — PROGRESS NOTES
Hospitalist Progress Note      PCP: Rodolfo Wadsworth MD    Date of Admission: 4/27/2020    Chief Complaint: encephalpathy    HPI per admitting physician: \"This is a 69-year-old male with history of advanced dementia, hypertension, CAD status post stenting who presented from home due to mental status changes and unable to eat and drink. Patient was recently discharged from the hospital on 4/15. Patient is encephalopathic on my evaluation so most of the history was taken from ED and discussion with wife over the phone.     Wife reported that patient has been progressively declining but for about a week or so he has a stop eating and drinking she also reported that patient has advanced dementia so he cannot keep himself hydrated. She reported that the talk to their PCP was recommending PEG tube placement.     Wife denied any fever, chills, shortness of breath, nausea, vomiting at home.     Discussed with wife regarding hospice due to advanced dementia and continues declining in patients health. Wife reported that she has been in contact with hospice agency and patient will be transitioned to hospice after discharge. \"    Hospital Course:  PEG tube was placed. Patient subsequently pulled this out which required repeat placement as well as closure/patch of old site. Upper GI 5/2 showed no extravasation of contrast but delayed gastric emptying. TF increased per surgery. Urine cx positive for GNR. IV abx started    Subjective:  Patient seen and examined at the bedside. Responding minimally. TF increased to 20 per surgery. NGT removed.   Discussed with wife, answered questions    PFHS: reviewed as documented 4/27/2020, no changes      Medications:  Reviewed    Infusion Medications    sodium chloride 75 mL/hr at 05/03/20 2670     Scheduled Medications    docusate  100 mg Per G Tube BID    sodium chloride (Inhalant)  15 mL Nebulization BID    ipratropium-albuterol  1 ampule Inhalation TID    metoclopramide  5 mg Intravenous Q6H    enoxaparin  40 mg Subcutaneous Daily     PRN Meds: metoprolol, ipratropium-albuterol, acetaminophen, HYDROmorphone **OR** HYDROmorphone, promethazine **OR** ondansetron, magnesium sulfate      Intake/Output Summary (Last 24 hours) at 5/4/2020 0848  Last data filed at 5/4/2020 0343  Gross per 24 hour   Intake 2009.28 ml   Output 1275 ml   Net 734.28 ml         Physical Exam Performed:    /69   Pulse 66   Temp 97.6 °F (36.4 °C) (Axillary)   Resp 19   Ht 6' 2\" (1.88 m)   Wt 173 lb 15.1 oz (78.9 kg)   SpO2 100%   BMI 22.33 kg/m²     General appearance:  No apparent distress, appears stated age  Eyes: Pupils equal, round, reactive to light, conjunctiva/corneas clear  Ears/Nose/Mouth/Throat: No external lesions or scars, hearing intact to voice  Neck: Trachea midline, no masses noted  Respiratory:  Normal respiratory effort. Clear to auscultation bilaterally  Cardiovascular: Regular rate and rhythm, nl S1/S2, w/o murmurs, rubs or gallops, no lower extremity edema  Abdomen: Soft, non-tender, non-distended, no hepatosplenomegaly  Musculoskeletal: No cyanosis, clubbing or petechiae, no lower extremity misalignment, asymmetry, or crepitation  Skin: Normal skin color, texture, turgor. No rashes or lesions noted. Neuro/Psychiatric: Minimally responsive    Labs:   Recent Labs     05/02/20  0414 05/03/20  0531 05/04/20  0434   WBC 5.1 7.7 6.0   HGB 12.1* 10.2* 10.1*   HCT 35.9* 30.8* 30.5*    146 148     Recent Labs     05/03/20  0531  05/03/20  1451 05/03/20  2318 05/04/20  0434      < > 142  145  143 142 145   K 4.2   < > 4.0  4.0 3.8 3.9      < > 104  105 104 106   CO2 32   < > 27  27 27 28   BUN 17  --  17  --  19   CREATININE 1.2  --  1.1  --  1.0   CALCIUM 8.2*  --  8.2*  --  8.2*   PHOS 2.8  --  2.5  --  3.1    < > = values in this interval not displayed. No results for input(s): AST, ALT, BILIDIR, BILITOT, ALKPHOS in the last 72 hours.   No results for input(s): INR in the last 72 hours. No results for input(s): Haley Whitehead in the last 72 hours. Urinalysis:      Lab Results   Component Value Date    NITRU Negative 05/03/2020    WBCUA 11-20 05/03/2020    BACTERIA 4+ 05/03/2020    RBCUA 3-4 05/03/2020    BLOODU TRACE-INTACT 05/03/2020    SPECGRAV 1.020 05/03/2020    GLUCOSEU Negative 05/03/2020       Radiology:  XR ABDOMEN (KUB) (SINGLE AP VIEW)   Final Result      1. No contrast identified in the stomach or abdomen. The pelvis was not included in this exam and repeat study including the pelvis is recommended to evaluate for possible progression of contrast.  The lack of contrast in this exam raises concern for    possible interval removal of the contrast from the stomach through the patient's indwelling NG tube rather than progression to bowel loops. Correlate clinically. FL UGI   Final Result      LIMITED UPPER GI DUE TO POSTSURGICAL STATUS AND PATIENT'S DECREASED ABILITY TO COOPERATE      Evidence of an omental or surgical patch in the stomach with no sign of any leakage of contrast material. There is delayed transit through the stomach into the duodenal cap and suboptimal assessment of the small bowel on this limited study. KUB overhead    films obtained at 1 hour demonstrate activity in the duodenal cap, proximal portion of the small bowel but not within the remainder of the duodenum or jejunum      No extravasation with small bowel ileus noted. G-tube remains in place in satisfactory position without leak around the G-tube      XR CHEST PORTABLE   Final Result      1. Minimal bibasilar atelectasis, otherwise clear lungs. 2.  NG tube looped in the upper stomach. XR CHEST PORTABLE   Final Result      No acute cardiopulmonary disease      CT ABDOMEN PELVIS WO CONTRAST Additional Contrast? None   Final Result   1. No acute abnormality of the abdomen or pelvis. 2. Elevation of the left hemidiaphragm, unchanged from prior studies.    3. Multiple gas opacified loops of large and small bowel are again noted. No obstruction. Transverse colon is interposed between the stomach and anterior abdominal wall. CT HEAD WO CONTRAST   Final Result      1. NO ACUTE INTRACRANIAL ABNORMALITY. Assessment/Plan:    Active Hospital Problems    Diagnosis Date Noted    Dislodged gastrostomy tube ([facility] [Z43.1]     Altered mental status [R41.82]     Severe protein-calorie malnutrition ([address] [E43]     Hypernatremia [E87.0] 04/27/2020       Plan:    # Acute metabolic encephalopathy  -Multifactorial, high Na, dehydration, dementia, NENA, UTI     # Hypernatremia  -Due to dehydration  -Continue IVF, follow electrolytes    #UTI:  Urine cx with GNR  IV ceftriaxone started     # NENA:  -pre-renal from dehydration  -Resolved with IVF     # Elevated troponin  -No EKG changes     # Advanced dementia  # Dysphagia due to advanced dementia  -upper endoscopy yesterday showed delayed gastric emptying  -TF increased to 20 ml/hr per surgery, no meds per tube  -Out of restraints, sitter outside the room    DVT Prophylaxis: Lovenox  Diet: DIET TUBE FEED CONTINUOUS/CYCLIC NPO; 1.5 Calorie with Fiber (Jevity 1.5);  Gastrostomy; Continuous; 10; 70; 24  Code Status: Full Code    PT/OT Eval Status: n/a    Dispo - Bonnie Amador MD

## 2020-05-05 NOTE — PLAN OF CARE
Problem: Nutrition  Goal: Optimal nutrition therapy  5/5/2020 1130 by Carmen George RD, LD  Outcome: Ongoing   Nutrition Problem: Severe malnutrition  Intervention: Food and/or Nutrient Delivery: Continue NPO, Continue current Tube Feeding  Nutritional Goals: Patient will tolerate EN to meet 100% of nutrition needs

## 2020-05-05 NOTE — PROGRESS NOTES
Spoke with pt's wife. Wife updated on POC. Wife has questions regarding pt's discharge. Wife would like to speak to hospitalist. Perfect serve message sent to Dr. Braden Briscoe.  Electronically signed by Richy Fay RN on 5/5/2020 at 10:11 AM 8

## 2020-05-05 NOTE — PLAN OF CARE
Problem: Falls - Risk of:  Goal: Will remain free from falls  Description: Will remain free from falls  Outcome: Ongoing  Note: Pt has been free from fall during shift. . Call light in reach. Bed in lowest position. Bed alarm on. Will continue to monitor       Problem: Fluid Volume:  Goal: Ability to achieve a balanced intake and output will improve  Description: Ability to achieve a balanced intake and output will improve  Outcome: Ongoing  Note: Pt received electrolyte replacements during this shift.       Problem: Nutrition  Goal: Optimal nutrition therapy  5/5/2020 1130 by Christiano Gao RD, LD  Outcome: Ongoing

## 2020-05-05 NOTE — PROGRESS NOTES
99.3 °F (37.4 °C)      Head: Normocephalic, without obvious abnormality, atraumatic  Neck: supple, symmetrical, trachea midline and thyroid not enlarged, symmetric, no tenderness/mass/nodules  CVS:  RRR, Nl s1s2  Lungs CTA Bilaterally, normal effort  Abdomen: soft, non-tender; bowel sounds normal; no masses,  no organomegaly. G tube  Extremities: No edema. Lab Data:  Recent Labs     05/02/20  0414 05/03/20  0531 05/04/20  0434   WBC 5.1 7.7 6.0   HGB 12.1* 10.2* 10.1*   HCT 35.9* 30.8* 30.5*   MCV 90.2 90.5 91.3    146 148     Recent Labs     05/03/20  0531  05/03/20  1451 05/03/20  2318 05/04/20  0434 05/04/20  1440      < > 142  145  143 142 145 142   K 4.2   < > 4.0  4.0 3.8 3.9 4.2      < > 104  105 104 106 105   CO2 32   < > 27  27 27 28 26   PHOS 2.8  --  2.5  --  3.1  --    BUN 17  --  17  --  19  --    CREATININE 1.2  --  1.1  --  1.0  --     < > = values in this interval not displayed. No results for input(s): AST, ALT, ALB, BILIDIR, BILITOT, ALKPHOS in the last 72 hours. No results for input(s): LIPASE, AMYLASE in the last 72 hours. No results for input(s): PROT, INR in the last 72 hours. No results for input(s): PTT in the last 72 hours. No results for input(s): OCCULTBLD in the last 72 hours. Assessment:       Active Problems:    Hypernatremia    Altered mental status    Severe protein-calorie malnutrition ([address]    Dislodged gastrostomy tube (HCC)  Resolved Problems:    * No resolved hospital problems.  *  dementia  Weight loss    Recommendations:       As per surgical team    Inocencia Gatica MD  5/4/2020  8:34 PM

## 2020-05-05 NOTE — PROGRESS NOTES
NUTRITION ASSESSMENT  Admission Date: 4/27/2020     Type and Reason for Visit: Reassess    NUTRITION RECOMMENDATIONS:     Continue with ENTERAL NUTRITION at goal:  1. Recommend order \"Diet: Tube feed continuous/ NPO\". Jevity 1.5 formula at 70 mL/hr via PEG. 2. Suggest 200 mL H20 q 4 hours for 100% fluid recommendations when no IVF. 3. Monitor closely and correct lytes (K, Phos, Mg) d/t risk of refeeding syndrome (hx extended inadequate nutritional intake). 4. Monitor for tolerance (bowel habits, N/V, cramping, abdominal distention). 5. Monitor for need to convert continuous feeding to nocturnal / prn with diet to encourage po intake, lifestyle. NUTRITION ASSESSMENT:  S/P UGI and PEG placement on 5/2; trophic TF started and gradually increased until goal was achieved this AM. Nutritionally improving as TF running at goal now and pt is tolerating well per nursing's report. RD will continue to monitor nutritional status per [facility].      MALNUTRITION ASSESSMENT  Context: Chronic illness   Malnutrition Status: Mild Malnutrition(suspect mod/severe)  Findings of the 6 clinical characteristics of malnutrition (Minimum of 2 out of 6 clinical characteristics is required to make the diagnosis of moderate or severe Protein Calorie Malnutrition based on AND/ASPEN Guidelines):  Energy Intake %: Less than or equal to 50% of estimated energy requirement  Energy Intake Time: Greater than or equal to 7 days  Interpretation of Weight Loss %: 7.5% loss or greater  Interpretation of Weight Loss Time: (~3 weeks)  Body Fat Status: Severe subcutaneous fat loss  Body Fat Loss Location: Orbital  Muscle Mass Status: Severe muscle mass loss  Muscle Mass Loss Location: Temples (temporalis muscle)  Fluid Accumulation Status: No significant fluid accumulation  Reduced  Strength: Not measured    NUTRITION DIAGNOSIS  Problem: Severe Malnutrition  Etiology: Insufficient energy/nutrient consumption  Signs & Symptoms: Diet history of poor intake , Fat Loss  and Muscle loss     NUTRITION INTERVENTION  Food and/or Nutrient Delivery:Continue NPO or Continue Current Tube Feeding   Nutrition education/counseling/coordination of care: Continue Inpatient Monitoring     NUTRITION MONITORING & EVALUATION:  Evaluation:Progressing towards goal   Goals: Patient will tolerate EN to meet 100% of nutrition needs via PEG  Monitoring: Nutrition Progression  or Pertinent Labs      OBJECTIVE DATA:  · Nutrition-Focused Physical Findings: s/p PEG. + BM 5/4  · Wounds Surgical Wound      Past Medical History:   Diagnosis Date    Anxiety     BPH     CAD (coronary artery disease)     [facility] ([facility]     Dementia ([facility]     Depression     Hearing loss     Hyperlipidemia     Hypertension     Sinusitis     Vertigo         ANTHROPOMETRICS  Current Height: 6' 2\" (188 cm)  Current Weight: 173 lb 15.1 oz (78.9 kg)    Admission weight: 188 lb (85.3 kg)  Ideal Bodyweight 190 lb (86 kg)   Weight Changes 7.5% wt loss ~3 weeks       BMI BMI (Calculated): 22.4    Wt Readings from Last 50 Encounters:   04/28/20 173 lb 15.1 oz (78.9 kg)   04/09/20 187 lb 9.8 oz (85.1 kg)   03/17/20 200 lb (90.7 kg)   03/09/20 200 lb (90.7 kg)   02/19/20 199 lb 12.8 oz (90.6 kg)   01/23/20 201 lb (91.2 kg)   01/13/20 204 lb 9.6 oz (92.8 kg)   08/29/19 215 lb 6.4 oz (97.7 kg)   05/31/19 219 lb 4.8 oz (99.5 kg)   03/13/19 220 lb (99.8 kg)       COMPARATIVE STANDARDS  Estimated Total Kcals/Day : 30-35 Current Bodyweight (79 kg) 4763-6731 kcal    Estimated Total Protein (g/day) : 1.3-1.5 Current Bodyweight (79 kg) 118-142 g/day  Estimated Daily Total Fluid (ml/day): 2370 mL per day     Food / Nutrition-Related History  Pre-Admission / Home Diet:  Pre-Admission/Home Diet: General   Home Supplements / Herbals:    none noted  Food Restrictions / Cultural Requests:    none noted    Diet Orders / Intake / Nutrition Support  Current diet/supplement order: DIET TUBE FEED CONTINUOUS/CYCLIC NPO; 1.5 Calorie with Fiber (Jevity 1.5); Gastrostomy; Continuous; 10; 70; 24     NSG Recorded PO:   PO Fluids P.O.: 0 mL  PO Meals PO Meals Eaten (%): 26 - 50%   PO Intake: NPO  PO Supplement: NPO   PO Supplement Intake: NPO    Tube Feeding Goal: Jevity 1.5 (standard with fiber 1.5 ) @ 70 mL/hr provides 1680 mL TV, 2520 kcal, 105 grams protein, 1276 mL free water. Provide water bolus 200 mL every 4 hrs.      NUTRITION RISK LEVEL: Risk Level: High    Christiano Gao, [address]  [place]:  [phone]  Office:  [phone]

## 2020-05-05 NOTE — PROGRESS NOTES
Pt VSS. ADILSON orientation d/t nonverbal. G tube remains intact-receiving tube feeds- @ 60/hr, goal is 70. Pt appears to be tolerating feeds well-residuals low (see flowsheet). Can switch to 70/hr at 8 AM.  IV fluids infusing. Voiding adequately. Had small BM this shift. Still has some crackles to LLL. Pt currently relaxed in bed. Will continue to monitor.

## 2020-05-05 NOTE — PROGRESS NOTES
Unable to assess pt's neurological states. Pt non-verbal and only responses to pain. VSS. G- tube dressing dry and intact. Tube feed increased to goal rate 70 ml/hr. Tolerating well. IVF infusing. Pt voiding and has had small bowel movement. Pt has been resting in bed throughout shift. Will continue to monitor.  Electronically signed by Kerrie Hudson RN on 5/5/2020 at 2:59 PM

## 2020-05-05 NOTE — PROGRESS NOTES
Surgery Daily Progress Note  Kimberlyn Monday  CC:  Malnutrition      Subjective :No acute events overnight. Tube feeds infusing at 60 cc/hr. Patient remains with low residuals. Goal is at 70 cc/hr which can be achieved by 8:00am today. Patient has remained afebrile. Presently on 1 LPM/nc. Had BM      Objective    Infusions:   sodium chloride 75 mL/hr at 05/05/20 0209        I/O:I/O last 3 completed shifts: In: 1036.9 [I.V.:571.9; NG/GT:465]  Out: 2375 [Urine:2375]           Wt Readings from Last 1 Encounters:   04/28/20 173 lb 15.1 oz (78.9 kg)                 LABS:    Recent Labs     05/04/20  0434 05/05/20  0451   WBC 6.0 6.0   HGB 10.1* 9.5*   HCT 30.5* 28.1*   MCV 91.3 89.3    164        Recent Labs     05/04/20  0434 05/04/20  1440 05/04/20  2349 05/05/20  0451    142 141 139   K 3.9 4.2  --  3.8    105  --  104   CO2 28 26  --  26   PHOS 3.1  --   --  2.7   BUN 19  --   --  16   CREATININE 1.0  --   --  0.9      No results for input(s): AST, ALT, ALB, BILIDIR, BILITOT, ALKPHOS in the last 72 hours. No results for input(s): LIPASE, AMYLASE in the last 72 hours. No results for input(s): PROT, INR, APTT in the last 72 hours. No results for input(s): CKTOTAL, CKMB, CKMBINDEX, TROPONINI in the last 72 hours. Exam:/63   Pulse 58   Temp 98.4 °F (36.9 °C) (Axillary)   Resp 15   Ht 6' 2\" (1.88 m)   Wt 173 lb 15.1 oz (78.9 kg)   SpO2 96%   BMI 22.33 kg/m²   General appearance: alert, appears stated age and cooperative  Lungs: clear to auscultation bilaterally  Heart: regular rate and rhythm, S1, S2   Abdomen: soft, appropriately-tender; bowel sounds present. Incisions c/d/i. Tube feeds infusing in G tube without difficulty. Remains with low residuals      ASSESSMENT/PLAN: Pt. is a 75 y.o. male s/p laparoscopic assisted PEG placement secondary to malnutrition.  Required return to OR for [address] gastrostomy     - Tube feeds to continue - will be increased to 70 cc /hr which is goal at 8:00am  - continue Reglan  - Colace liquid BID through G tube  - Discussed with staff - patient ok to be discharged from surgical standpoint  - OK to restart Plavix and ASA  - Disposition per primary team          [facility] 5/5/2020 6:20 AM  [phone]

## 2020-05-05 NOTE — CARE COORDINATION
[facility]     Patient aware and agreeable to services. Faxed orders to [facility].     Vanesa Duran LPN  Care Transition Nurse  [address]  [phone]

## 2020-05-05 NOTE — PROGRESS NOTES
GI Progress Note    Eli Washington is a 75 y.o. male patient. 1. Altered mental status, unspecified altered mental status type    2. Dehydration    3. Hypernatremia    4. Severe dementia  5. Weight loss    Admit Date: 2020    Subjective:       Non verbal  Tolerating tube feeding at 60 cc per hour  Had BM    Scheduled Meds:   docusate  100 mg Per G Tube BID    sodium chloride (Inhalant)  15 mL Nebulization BID    albuterol  2.5 mg Nebulization TID    cefTRIAXone (ROCEPHIN) IV  1 g Intravenous Q24H    metoclopramide  5 mg Intravenous Q6H    enoxaparin  40 mg Subcutaneous Daily       Continuous Infusions:   sodium chloride 50 mL/hr at 20 1950       PRN Meds:  oxyCODONE, metoprolol, ipratropium-albuterol, acetaminophen, promethazine **OR** ondansetron, magnesium sulfate      Objective:       Patient Vitals for the past 24 hrs:   BP Temp Temp src Pulse Resp SpO2   20 1744 120/73 101 °F (38.3 °C) Axillary 59 -- 94 %   20 1527 126/67 99.4 °F (37.4 °C) Axillary 64 -- 92 %   20 1440 -- -- -- -- 18 91 %   20 1420 -- -- -- -- -- 93 %   20 1100 118/69 98.2 °F (36.8 °C) Axillary 58 18 97 %   20 0833 -- -- -- -- 16 96 %   20 0730 123/61 98.2 °F (36.8 °C) Axillary 65 18 96 %   20 0335 118/63 98.4 °F (36.9 °C) Axillary 58 15 96 %   20 2230 (!) 144/74 98.9 °F (37.2 °C) Oral 62 15 97 %   20 2102 -- -- -- -- -- 96 %       Exam:    VITALS:  /73   Pulse 59   Temp 101 °F (38.3 °C) (Axillary)   Resp 18   Ht 6' 2\" (1.88 m)   Wt 173 lb 15.1 oz (78.9 kg)   SpO2 94%   BMI 22.33 kg/m²   TEMPERATURE:  Current - Temp: 101 °F (38.3 °C);  Max - Temp  Av °F (37.2 °C)  Min: 98.2 °F (36.8 °C)  Max: 101 °F (38.3 °C)      Head: Normocephalic, without obvious abnormality, atraumatic  Neck: supple, symmetrical, trachea midline and thyroid not enlarged, symmetric, no tenderness/mass/nodules  CVS:  RRR, Nl s1s2  Lungs CTA Bilaterally, normal effort  Abdomen: soft, non-tender; bowel sounds normal; no masses,  no organomegaly. g tube noted and functioning  Extremities: No edema. Lab Data:  Recent Labs     05/03/20  0531 05/04/20  0434 05/05/20  0451   WBC 7.7 6.0 6.0   HGB 10.2* 10.1* 9.5*   HCT 30.8* 30.5* 28.1*   MCV 90.5 91.3 89.3    148 164     Recent Labs     05/03/20  1451  05/04/20  0434 05/04/20  1440  05/05/20  0451 05/05/20  0754 05/05/20  1541     145  143   < > 145 142   < > 139 138 140   K 4.0  4.0   < > 3.9 4.2  --  3.8  --   --      105   < > 106 105  --  104  --   --    CO2 27  27   < > 28 26  --  26  --   --    PHOS 2.5  --  3.1  --   --  2.7  --   --    BUN 17  --  19  --   --  16  --   --    CREATININE 1.1  --  1.0  --   --  0.9  --   --     < > = values in this interval not displayed. No results for input(s): AST, ALT, ALB, BILIDIR, BILITOT, ALKPHOS in the last 72 hours. No results for input(s): LIPASE, AMYLASE in the last 72 hours. No results for input(s): PROT, INR in the last 72 hours. No results for input(s): PTT in the last 72 hours. No results for input(s): OCCULTBLD in the last 72 hours. Assessment:       Active Problems:    Hypernatremia    Altered mental status    Severe protein-calorie malnutrition ([address]    Dislodged gastrostomy tube (HCC)  Resolved Problems:    * No resolved hospital problems.  *  weight loss  S/p G tube placement     Recommendations:       Tube feeding to reach the goal of 70 cc / h  Hospice care    Ivania Novak MD  5/5/2020  7:53 PM

## 2020-05-05 NOTE — CONSULTS
Goals of Care  __x___ Code Status Discussion/Advanced Care Planning   __x___ Psychosocial/Family Support  _____ Symptom Management  _____ Other (Specify)    Requesting Physician: Dr. Rodríguez Narayanan:  Encephalopathy    History Obtained From:  electronic medical record    History of Present Illness:         Mr Consuelo Rodriguez is a 74 yo male with PMH of advanced dementia, HTN, CAD, s/p stenting who presented from home due to [address] and not eating or drinking. He was recently discharged from the hospital 4/15. History obtained from pt's wife. Wife reported he has been progressively declining for a week, unable to keep him hydrated and PCP was recommending PEG tube placement. On admission, Dr. Saurav Oneill discussed hospice. Pt's wife reported that she had been in touch with a hospice agency and pt will be transitioned to hospice at discharge.      Subjective:                     Past Medical History:        Diagnosis Date    Anxiety     BPH     CAD (coronary artery disease)     [facility]     Dementia ([facility]     Depression     Hearing loss     Hyperlipidemia     Hypertension     Sinusitis     Vertigo        Past Surgical History:        Procedure Laterality Date    ANGIOPLASTY  2009    GASTROSTOMY TUBE PLACEMENT N/A 4/28/2020    LAPAROSCOPIC GASTROSTOMY  TUBE PLACEMENT, ESOPHAGOGASTRODUODENOSCOPY performed by Rojelio Taylor DO at [address] N/A 4/29/2020    OPEN JANEWAY GASTROSTOMY, CLOSURE OF GASTROTOMY WITH INTRA ABDOMINAL OMENTAL FLAP, ENDOSCOPIC ASSISTED NASAL GASTRIC TUBE PLACEMENT performed by Rojelio Taylor DO at [address] OR       Current Medications:    Current Facility-Administered Medications: potassium phosphate 10 mmol in dextrose 5 % 250 mL IVPB, 10 mmol, Intravenous, Once  oxyCODONE (ROXICODONE) 5 MG/5ML solution 5 mg, 5 mg, Oral, Q4H PRN  docusate (COLACE) 50 MG/5ML liquid 100 mg, 100 mg, Per G Tube, BID  sodium chloride (Inhalant) 3 % nebulizer solution 15 mL, 15 mL, Nebulization, BID  albuterol (PROVENTIL) nebulizer solution 2.5 mg, 2.5 mg, Nebulization, TID  cefTRIAXone (ROCEPHIN) 1 g IVPB in 50 mL D5W minibag, 1 g, Intravenous, Q24H  metoprolol (LOPRESSOR) injection 5 mg, 5 mg, Intravenous, Q5 Min PRN  ipratropium-albuterol (DUONEB) nebulizer solution 1 ampule, 1 ampule, Inhalation, Q4H PRN  metoclopramide (REGLAN) injection 5 mg, 5 mg, Intravenous, Q6H  acetaminophen (TYLENOL) suppository 650 mg, 650 mg, Rectal, Q4H PRN  0.45 % sodium chloride infusion, , Intravenous, Continuous  promethazine (PHENERGAN) tablet 12.5 mg, 12.5 mg, Per G Tube, Q6H PRN **OR** ondansetron (ZOFRAN) injection 4 mg, 4 mg, Intravenous, Q6H PRN  enoxaparin (LOVENOX) injection 40 mg, 40 mg, Subcutaneous, Daily  magnesium sulfate 2 g in 50 mL IVPB premix, 2 g, Intravenous, PRN    Allergies:  Patient has no known allergies. Social History:    Pt from home with wife and private duty caregivers. Review of Systems -   Unable to perform ROS d/t pt mental status    Objective:        PHYSICAL EXAM:    /69   Pulse 58   Temp 98.2 °F (36.8 °C) (Axillary)   Resp 18   Ht 6' 2\" (1.88 m)   Wt 173 lb 15.1 oz (78.9 kg)   SpO2 97%   BMI 22.33 kg/m²   General appearance: no apparent distress  Lungs: clear to auscultation bilaterally  Heart: regular rate and rhythm, S1, S2 normal, no murmur, click, rub or gallop  Abdomen: soft, non-tender; bowel sounds normal; no masses,  no organomegaly and PEG tube  Extremities: extremities normal, atraumatic, no cyanosis or edema  Skin: Skin color, texture, turgor normal. No rashes or lesions  Neurologic: minimally responsive, does not regard my presence, withdraws to painful stimuli    Palliative Performance Scale:  60% ? Ambulation reduced; Significant disease; Can't do hobbies/housework; intake normal   or reduced; occasional assist; LOC full/confusion  50% ? Mainly sit/lie;  Extensive disease; Can't do any work; Considerable assist; intake normal  Or reduced; LOC full/confusion  40% ? Mainly in bed; Extensive disease; Mainly assist; intake normal or reduced; occasional assist; LOC full/confusion  30% ? Bed Bound; Extensive disease; Total care; intake reduced; LOC full/confusion  20% ? Bed Bound; Extensive disease; Total care; intake minimal; Drowsy/coma  10% ? Bed Bound; Extensive disease; Total care; Mouth care only; Drowsy/coma  0 ? Death    PPS: 20    Vitals:    /69   Pulse 58   Temp 98.2 °F (36.8 °C) (Axillary)   Resp 18   Ht 6' 2\" (1.88 m)   Wt 173 lb 15.1 oz (78.9 kg)   SpO2 97%   BMI 22.33 kg/m²     DATA:    CBC:   Lab Results   Component Value Date    WBC 6.0 05/05/2020    RBC 3.14 05/05/2020    HGB 9.5 05/05/2020    HCT 28.1 05/05/2020    MCV 89.3 05/05/2020    MCH 30.1 05/05/2020    MCHC 33.7 05/05/2020    RDW 14.5 05/05/2020     05/05/2020    MPV 8.8 05/05/2020     BMP:    Lab Results   Component Value Date     05/05/2020    K 3.8 05/05/2020    K 4.2 04/28/2020     05/05/2020    CO2 26 05/05/2020    BUN 16 05/05/2020    LABALBU 2.5 05/05/2020    CREATININE 0.9 05/05/2020    CALCIUM 8.1 05/05/2020    GFRAA >60 05/05/2020    LABGLOM >60 05/05/2020    GLUCOSE 131 05/05/2020     Albumin:    Lab Results   Component Value Date    LABALBU 2.5 05/05/2020         Conclusion/Time spent:         Recommendations see above    Pt/family: [id number]  Time spent with patient and/or family: 20  Time reviewing records: 5  Time communicating with staff: 10    A total of 35 minutes spent with the patient and family on unit greater than 50% in counseling regarding palliative care and in goals of care for the patient. Thank you to Dr. Silvina Bunn for this consultation. We will continue to follow Mr. Smith's care as needed.         [facility]  Inpatient Palliative Care  [phone]

## 2020-05-05 NOTE — PROGRESS NOTES
(cm) 1 cm 5/5/2020 12:38 PM   Wound Depth (cm) 0 cm 5/5/2020 12:38 PM   Wound Surface Area (cm^2) 1.5 cm^2 5/5/2020 12:38 PM   Wound Volume (cm^3) 0 cm^3 5/5/2020 12:38 PM   Wound Assessment Clean;Dry; Intact; Brown;Black 5/5/2020 12:38 PM   Drainage Amount None 5/5/2020 12:38 PM   Odor None 5/5/2020 12:38 PM   Margins Attached edges 5/5/2020 12:38 PM   Number of days: 0     Pink scarring over coccyx only - removed \"Coccyx\" from [address] [place] [address]. Response to treatment: no grimacing/c/o's  Plan:   Plan of Care: Wound 05/05/20 Heel Left;Medial Unstageable pressure injury-Dressing/Treatment: Betadine swabs, Open to air  Wound 05/05/20 Foot Left;Medial 1st metatarsal head unstageable pressure injury-Dressing/Treatment: Betadine swabs  Wound 04/27/20 Coccyx-Dressing/Treatment: Open to air  [REMOVED] Incision 04/28/20 Abdomen-Dressing/Treatment: Open to air  Incision 04/29/20 Abdomen Medial;Upper-Dressing/Treatment: Open to air    Specialty Bed Required : in place  [x] Low Air Loss   [] Pressure Redistribution  [] Fluid Immersion  [] Bariatric  [] Total Pressure Relief  [] Other:     Current Diet: DIET TUBE FEED CONTINUOUS/CYCLIC NPO; 1.5 Calorie with Fiber (Jevity 1.5);  Gastrostomy; Continuous; 10; 70; 24    Patient/Caregiver Teaching:instructed steps as performed  Level of patient/caregiver understanding able to:   [] Indicates understanding       [x] Needs reinforcement-opens eyes slightly, 1 agreeable moan noted on queswtionoing[] Unsuccessful      [] Verbal Understanding  [] Demonstrated understanding       [] No evidence of learning  [] Refused teaching         [] N/A       Electronically signed by Parrish Caraballo RN, [place] on 5/5/2020 at 12:43 PM

## 2020-05-05 NOTE — PROGRESS NOTES
Hospitalist Progress Note      PCP: Sheeba Mcdowell MD    Date of Admission: 4/27/2020    Chief Complaint: encephalpathy    HPI per admitting physician: \"This is a 27-year-old male with history of advanced dementia, hypertension, CAD status post stenting who presented from home due to mental status changes and unable to eat and drink. Patient was recently discharged from the hospital on 4/15. Patient is encephalopathic on my evaluation so most of the history was taken from ED and discussion with wife over the phone.     Wife reported that patient has been progressively declining but for about a week or so he has a stop eating and drinking she also reported that patient has advanced dementia so he cannot keep himself hydrated. She reported that the talk to their PCP was recommending PEG tube placement.     Wife denied any fever, chills, shortness of breath, nausea, vomiting at home.     Discussed with wife regarding hospice due to advanced dementia and continues declining in patients health. Wife reported that she has been in contact with hospice agency and patient will be transitioned to hospice after discharge. \"    Hospital Course:  PEG tube was placed. Patient subsequently pulled this out which required repeat placement as well as closure/patch of old site. Upper GI 5/2 showed no extravasation of contrast but delayed gastric emptying. TF at goal. Urine cx positive for proteus. Cont ceftraixone    Subjective:  Patient seen and examined at the bedside. Resting. Grimaces with painful stimuli. TF at goal.  Discussed with wife, answered questions. Plan to discharge home tomorrow if patient stable and no events. Discussed with SW.      PFHS: reviewed as documented 4/27/2020, no changes      Medications:  Reviewed    Infusion Medications    sodium chloride 50 mL/hr at 05/05/20 0911     Scheduled Medications    potassium phosphate IVPB  10 mmol Intravenous Once    docusate  100 mg Per G Tube BID    sodium chloride (Inhalant)  15 mL Nebulization BID    albuterol  2.5 mg Nebulization TID    cefTRIAXone (ROCEPHIN) IV  1 g Intravenous Q24H    metoclopramide  5 mg Intravenous Q6H    enoxaparin  40 mg Subcutaneous Daily     PRN Meds: oxyCODONE, metoprolol, ipratropium-albuterol, acetaminophen, promethazine **OR** ondansetron, magnesium sulfate      Intake/Output Summary (Last 24 hours) at 5/5/2020 1135  Last data filed at 5/5/2020 0920  Gross per 24 hour   Intake 925 ml   Output 1925 ml   Net -1000 ml         Physical Exam Performed:    /69   Pulse 58   Temp 98.2 °F (36.8 °C) (Axillary)   Resp 18   Ht 6' 2\" (1.88 m)   Wt 173 lb 15.1 oz (78.9 kg)   SpO2 97%   BMI 22.33 kg/m²     General appearance:  No apparent distress, appears stated age  Eyes: Pupils equal, round, reactive to light, conjunctiva/corneas clear  Ears/Nose/Mouth/Throat: No external lesions or scars, hearing intact to voice  Neck: Trachea midline, no masses noted  Respiratory:  Normal respiratory effort. Clear to auscultation bilaterally  Cardiovascular: Regular rate and rhythm, nl S1/S2, w/o murmurs, rubs or gallops, no lower extremity edema  Abdomen: Soft, non-tender, non-distended, no hepatosplenomegaly  Musculoskeletal: No cyanosis, clubbing or petechiae, no lower extremity misalignment, asymmetry, or crepitation  Skin: Normal skin color, texture, turgor. No rashes or lesions noted.   Neuro/Psychiatric: Minimally responsive, grimaces to painful stimuli    Labs:   Recent Labs     05/03/20  0531 05/04/20  0434 05/05/20  0451   WBC 7.7 6.0 6.0   HGB 10.2* 10.1* 9.5*   HCT 30.8* 30.5* 28.1*    148 164     Recent Labs     05/03/20  1451  05/04/20  0434 05/04/20  1440 05/04/20  2349 05/05/20  0451 05/05/20  0754     145  143   < > 145 142 141 139 138   K 4.0  4.0   < > 3.9 4.2  --  3.8  --      105   < > 106 105  --  104  --    CO2 27  27   < > 28 26  --  26  --    BUN 17  --  19  --   --  16  --    CREATININE 1.1  -- 1.0  --   --  0.9  --    CALCIUM 8.2*  --  8.2*  --   --  8.1*  --    PHOS 2.5  --  3.1  --   --  2.7  --     < > = values in this interval not displayed. No results for input(s): AST, ALT, BILIDIR, BILITOT, ALKPHOS in the last 72 hours. No results for input(s): INR in the last 72 hours. No results for input(s): Vallarie Brunette in the last 72 hours. Urinalysis:      Lab Results   Component Value Date    NITRU Negative 05/03/2020    WBCUA 11-20 05/03/2020    BACTERIA 4+ 05/03/2020    RBCUA 3-4 05/03/2020    BLOODU TRACE-INTACT 05/03/2020    SPECGRAV 1.020 05/03/2020    GLUCOSEU Negative 05/03/2020       Radiology:  XR ABDOMEN (KUB) (SINGLE AP VIEW)   Final Result      1. No contrast identified in the stomach or abdomen. The pelvis was not included in this exam and repeat study including the pelvis is recommended to evaluate for possible progression of contrast.  The lack of contrast in this exam raises concern for    possible interval removal of the contrast from the stomach through the patient's indwelling NG tube rather than progression to bowel loops. Correlate clinically. FL UGI   Final Result      LIMITED UPPER GI DUE TO POSTSURGICAL STATUS AND PATIENT'S DECREASED ABILITY TO COOPERATE      Evidence of an omental or surgical patch in the stomach with no sign of any leakage of contrast material. There is delayed transit through the stomach into the duodenal cap and suboptimal assessment of the small bowel on this limited study. KUB overhead    films obtained at 1 hour demonstrate activity in the duodenal cap, proximal portion of the small bowel but not within the remainder of the duodenum or jejunum      No extravasation with small bowel ileus noted. G-tube remains in place in satisfactory position without leak around the G-tube      XR CHEST PORTABLE   Final Result      1. Minimal bibasilar atelectasis, otherwise clear lungs. 2.  NG tube looped in the upper stomach.       XR CHEST PORTABLE   Final Result      No acute cardiopulmonary disease      CT ABDOMEN PELVIS WO CONTRAST Additional Contrast? None   Final Result   1. No acute abnormality of the abdomen or pelvis. 2. Elevation of the left hemidiaphragm, unchanged from prior studies. 3. Multiple gas opacified loops of large and small bowel are again noted. No obstruction. Transverse colon is interposed between the stomach and anterior abdominal wall. CT HEAD WO CONTRAST   Final Result      1. NO ACUTE INTRACRANIAL ABNORMALITY. Assessment/Plan:    Active Hospital Problems    Diagnosis Date Noted    Dislodged gastrostomy tube ([facility] [Z43.1]     Altered mental status [R41.82]     Severe protein-calorie malnutrition ([address] [E43]     Hypernatremia [E87.0] 04/27/2020       Plan:    # Acute metabolic encephalopathy  -Multifactorial, high Na, dehydration, dementia, NENA, UTI     # Hypernatremia  -Due to dehydration  -Follow electrolytes  -Continue IVF, rate decreased    #UTI:  Urine cx positive for pansensitive proteus  Cont IV ceftriaxone, will change to PO tomorrow     # NENA:  -pre-renal from dehydration  -Resolved with IVF     # Elevated troponin  -No EKG changes     # Advanced dementia  # Dysphagia due to advanced dementia  # Mild malnutrition  -upper endoscopy yesterday showed delayed gastric emptying  -TF at goal  -Out of restraints, sitter at bedside    DVT Prophylaxis: Lovenox  Diet: DIET TUBE FEED CONTINUOUS/CYCLIC NPO; 1.5 Calorie with Fiber (Jevity 1.5); Gastrostomy; Continuous; 10; 70; 24  Code Status: Full Code    PT/OT Eval Status: n/a    Dispo - inpt, Palliative care consulted. Discussed with wife, answered questions. Plan to discharge home tomorrow if patient stable and no events.  Discussed with Mark Lazo MD

## 2020-05-06 NOTE — PROGRESS NOTES
RESPIRATORY THERAPY ASSESSMENT    Name:  [address] Record Number:  [id number]  Age: 75 y.o. Gender: male  : 1945  Today's Date:  2020  Room:  [facility]-01    Assessment     Is the patient being admitted for a COPD or Asthma exacerbation? No   (If yes the patient will be seen every 4 hours for the first 24 hours and then reassessed)    Patient Admission Diagnosis      Allergies  No Known Allergies    Minimum Predicted Vital Capacity:     1006          Actual Vital Capacity:      Unable due to mental  status              Pulmonary History:No history  Home Oxygen Therapy:  room air  Home Respiratory Therapy:None   Current Respiratory Therapy:  Albuterol Tid  Treatment Type: N  Medications: Albuterol, Sodium Chloride    Respiratory Severity Index(RSI)   Patients with orders for inhalation medications, oxygen, or any therapeutic treatment modality will be placed on Respiratory Protocol. They will be assessed with the first treatment and at least every 72 hours thereafter. The following severity scale will be used to determine frequency of treatment intervention.     Smoking History: No Smoking History = 0    Social History  Social History     Tobacco Use    Smoking status: Never Smoker    Smokeless tobacco: Never Used   Substance Use Topics    Alcohol use: No    Drug use: No       Recent Surgical History: None = 0  Past Surgical History  Past Surgical History:   Procedure Laterality Date    2009    GASTROSTOMY TUBE PLACEMENT N/A 2020    LAPAROSCOPIC GASTROSTOMY  TUBE PLACEMENT, ESOPHAGOGASTRODUODENOSCOPY performed by Octavio Choi DO at [address] [place] [address] N/A 2020    OPEN JANEWAY GASTROSTOMY, CLOSURE OF GASTROTOMY WITH INTRA ABDOMINAL OMENTAL FLAP, ENDOSCOPIC ASSISTED NASAL GASTRIC TUBE PLACEMENT performed by Octavio Choi DO at [facility] OR       Level of Consciousness: Disoriented and Uncooperative = 2    Level of Activity: Non weight bearing- demonstrate a 2-3 second inspiratory hold  4. Bronchodilators will be administered via Hand Held Nebulizer [facility]) to patients when ANY of the following criteria are met  a. Incognizant or uncooperative          b. Patients treated with HHN at Home        c. Unable to demonstrate proper use of MDI with spacer     d. RR > 30 bpm   5. Bronchodilators will be delivered via Metered Dose Inhaler (MDI), HHN, Aerogen to intubated patients on mechanical ventilation. 6. Inhalation medication orders will be delivered and/or substituted as outlined below. Aerosolized Medications Ordering and Administration Guidelines:    1. All Medications will be ordered by a physician, and their frequency and/or modality will be adjusted as defined by the patients Respiratory Severity Index (RSI) score. 2. If the patient does not have documented COPD, consider discontinuing anticholinergics when RSI is less than 9.  3. If the bronchospasm worsens (increased RSI), then the bronchodilator frequency can be increased to a maximum of every 4 hours. If greater than every 4 hours is required, the physician will be contacted. 4. If the bronchospasm improves, the frequency of the bronchodilator can be decreased, based on the patient's RSI, but not less than home treatment regimen frequency. 5. Bronchodilator(s) will be discontinued if patient has a RSI less than 9 and has received no scheduled or as needed treatment for 72  Hrs. Patients Ordered on a Mucolytic Agent:    1. Must always be administered with a bronchodilator. 2. Discontinue if patient experiences worsened bronchospasm, or secretions have lessened to the point that the patient is able to clear them with a cough. Anti-inflammatory and Combination Medications:    1.  If the patient lacks prior history of lung disease, is not using inhaled anti-inflammatory medication at home, and lacks wheezing by examination or by history for at least 24 hours, contact physician for possible discontinuation.

## 2020-05-06 NOTE — DISCHARGE SUMMARY
in the stomach or abdomen. The pelvis was not included in this exam and repeat study including the pelvis is recommended to evaluate for possible progression of contrast.  The lack of contrast in this exam raises concern for    possible interval removal of the contrast from the stomach through the patient's indwelling NG tube rather than progression to bowel loops. Correlate clinically. FL UGI   Final Result      LIMITED UPPER GI DUE TO POSTSURGICAL STATUS AND PATIENT'S DECREASED ABILITY TO COOPERATE      Evidence of an omental or surgical patch in the stomach with no sign of any leakage of contrast material. There is delayed transit through the stomach into the duodenal cap and suboptimal assessment of the small bowel on this limited study. KUB overhead    films obtained at 1 hour demonstrate activity in the duodenal cap, proximal portion of the small bowel but not within the remainder of the duodenum or jejunum      No extravasation with small bowel ileus noted. G-tube remains in place in satisfactory position without leak around the G-tube      XR CHEST PORTABLE   Final Result      1. Minimal bibasilar atelectasis, otherwise clear lungs. 2.  NG tube looped in the upper stomach. XR CHEST PORTABLE   Final Result      No acute cardiopulmonary disease      CT ABDOMEN PELVIS WO CONTRAST Additional Contrast? None   Final Result   1. No acute abnormality of the abdomen or pelvis. 2. Elevation of the left hemidiaphragm, unchanged from prior studies. 3. Multiple gas opacified loops of large and small bowel are again noted. No obstruction. Transverse colon is interposed between the stomach and anterior abdominal wall. CT HEAD WO CONTRAST   Final Result      1. NO ACUTE INTRACRANIAL ABNORMALITY.                 Consults:     IP CONSULT TO HOSPITALIST  IP CONSULT TO GI  IP CONSULT TO GENERAL SURGERY  IP CONSULT TO DIETITIAN  IP CONSULT TO PALLIATIVE CARE  IP CONSULT TO HOSPICE  IP CONSULT TO HOME CARE NEEDS    Disposition:  Home    Condition at Discharge: Stable    Discharge Instructions/Follow-up:  Follow up with surgery and PCP    Code Status:  Prior     Activity: activity as tolerated    Diet: DIET TUBE FEED CONTINUOUS/CYCLIC NPO; 1.5 Calorie with Fiber (Jevity 1.5); Gastrostomy; Continuous; 10; 70; 24, Free water boluses 200ml Q6hrs    Discharge Medications:     Discharge Medication List as of 5/6/2020 10:15 AM           Details   metoclopramide (REGLAN) 5 MG tablet Take 1 tablet by mouth 4 times daily, Disp-120 tablet, R-3Normal      cefdinir (OMNICEF) 300 MG capsule Take 1 capsule by mouth 2 times daily for 5 days, Disp-10 capsule, R-0Normal              Details   bisacodyl (DULCOLAX) 10 MG suppository Place 1 suppository rectally daily as needed for Constipation, Disp-10 suppository, R-0Normal      ondansetron (ZOFRAN-ODT) 4 MG disintegrating tablet Take 1 tablet by mouth every 8 hours as needed for Nausea or Vomiting, Disp-30 tablet, R-0Normal      QUEtiapine (SEROQUEL) 25 MG tablet Take 1 tablet by mouth nightly, Disp-30 tablet, R-2Normal      LORazepam (ATIVAN) 0.5 MG tablet TAKE 1 TABLET BY MOUTH EVERY 8 HOURS AS NEEDED FOR ANXIETY, Disp-100 tablet, R-2Normal      clopidogrel (PLAVIX) 75 MG tablet TAKE 1 TABLET BY MOUTH DAILY, Disp-90 tablet, R-3Normal      escitalopram (LEXAPRO) 20 MG tablet Take 1 tablet by mouth daily, Disp-90 tablet, R-5**Patient requests 90 days supply**Normal      ezetimibe-simvastatin (VYTORIN) 10-20 MG per tablet TAKE 1 TABLET BY MOUTH EVERY NIGHT, Disp-90 tablet, R-0Normal             Time Spent on discharge is more than 30 minutes in the examination, evaluation, counseling and review of medications and discharge plan. Signed:    Nikko Christiansen MD   5/6/2020      Thank you Maria G Abdul MD for the opportunity to be involved in this patient's care. If you have any questions or concerns please feel free to contact me at [phone].

## 2020-05-06 NOTE — CARE COORDINATION
Case Management Assessment            Discharge Note                    Date / Time of Note: 5/6/2020 9:41 AM                  Discharge Note Completed by: Renny Aceves    Patient Name: Joseph Holloway   YOB: 1945  Diagnosis: Hypernatremia [E87.0]  Hypernatremia [E87.0]   Date / Time: 4/27/2020 11:40 AM    Current PCP: Katlyn Valdovinos MD  Clinic patient: No    Hospitalization in the last 30 days: Yes    Advance Directives:  Code Status: Full Code  [place] DNR form completed and on chart: No    Financial:  Payor: MEDICARE / Plan: MEDICARE PART A AND B / Product Type: *No Product type* /      Pharmacy:    [address], [address] - P [phone] - F [phone]  [address] [address]  Phone: [phone] Fax: [phone]      Assistance purchasing medications?: Potential Assistance Purchasing Medications: No  Assistance provided by Case Management: None at this time    Does patient want to participate in local refill/ meds to beds program?: No    Meds To Beds General Rules:  1. Can ONLY be done Monday- Friday between 8:30am-5pm  2. Prescription(s) must be in pharmacy by 3pm to be filled same day  3. Copy of patient's insurance/ prescription drug card and patient face sheet must be sent along with the prescription(s)  4. Cost of Rx cannot be added to hospital bill. If financial assistance is needed, please contact unit  or ;  or  CANNOT provide pharmacy voucher for patients co-pays  5.  Patients can then  the prescription on their way out of the hospital at discharge, or pharmacy can deliver to the bedside if staff is available. (payment due at time of pick-up or delivery - cash, check, or card accepted)     Able to afford home medications/ co-pay costs: Yes    ADLS:  Current PT AM-PAC Score:   /24  Current OT AM-PAC Score:   /24      DISCHARGE Disposition: Home with Home Health Care: [facility] and Home Infusion: [facility] Phone: [phone] Fax: [phone]    LOC at discharge: Not Applicable  CODY Completed: Yes    Notification completed in HENS/PAS?:  No    IMM Completed:   No    Transportation:  Transportation PLAN for discharge: EMS transportation   Mode of Transport: Ambulance stretcher - BLS  Reason for medical transport: Flight Risk due to actue ,etabolic encephalopathy  Name of [address] [place] [address],[facility] 530: [address]  Phone: [phone]  Time of Transport: 12pm     Transport form completed: Yes    Home Care:  [address] ordered at discharge: Yes  [address]: [address]  Phone: [phone]  Fax: [phone]  Orders faxed: Yes    Durable Medical Equipment:  DME Provider: Jaky Morataya [phone]  Equipment obtained during hospitalization: lift and home suctioning     Hospice Services:  Location: Home  Agency: [facility]  Phone: [phone]    Consents signed: No    Referrals made at [facility] for outpatient continued care: Other: [facility] to meet 5/7 @ 430     Additional CM Notes: CM spoke with wife and let her know that [facility] would be supplying tube feeds and that [facility] would be in for [place] [address]. Lift and home suctioning  through [facility]. The Plan for Transition of Care is related to the following treatment goals of Hypernatremia [E87.0]  Hypernatremia [E87.0]    The Patient and/or patient representative Thomas Hitchcock and his family were provided with a choice of provider and agrees with the discharge plan Yes    Freedom of choice list was provided with basic dialogue that supports the patient's individualized plan of care/goals and shares the quality data associated with the providers.  Yes    Care Transitions patient: Yes    Rachel Garvey RN  [facility]  Case Management Department  Ph: [phone]  Fax: [phone]

## 2020-05-06 NOTE — DISCHARGE INSTR - COC
Continuity of Care Form    Patient Name: Catrachito Rodriguez   :  1945  MRN:  [id number]    Admit date:  2020  Discharge date:  20    Code Status Order: Full Code   Advance Directives:   [facility] Documentation     Date/Time Healthcare Directive Type of Healthcare Directive Copy in [address] [facility] [address] Agent's Name Healthcare Agent's Phone Number    20 2156  Yes, patient has an advance directive for healthcare treatment  Durable power of  for health care  No, copy requested from family  Spouse  Marly Joyce  [phone]          Admitting Physician:  Brad Begum MD  PCP: Joy Maher MD    Discharging Nurse: [facility] Unit/Room#: 0204/9344-51  Discharging Unit Phone Number:     Emergency Contact:   Extended Emergency Contact Information  Primary Emergency Contact: Mala Nirmal  Address: [address] [place] [address] Rina Delbertias Moritz [address] [place] [address] Phone: [phone]  Work Phone: [phone]  Mobile Phone: [phone]  Relation: Spouse  Secondary Emergency Contact: Noah Hall 3  Home Phone: [phone]  Mobile Phone: [phone]  Relation: Child    Past Surgical History:  Past Surgical History:   Procedure Laterality Date    ANGIOPLASTY      GASTROSTOMY TUBE PLACEMENT N/A 2020    LAPAROSCOPIC GASTROSTOMY  TUBE PLACEMENT, ESOPHAGOGASTRODUODENOSCOPY performed by Emma Antonio DO at [address] 2020    OPEN [address], CLOSURE OF GASTROTOMY WITH INTRA ABDOMINAL OMENTAL FLAP, ENDOSCOPIC ASSISTED NASAL GASTRIC TUBE PLACEMENT performed by Emma Antonio DO at [address]       Immunization History:   Immunization History   Administered Date(s) Administered    Influenza Virus Vaccine 10/09/2014    Influenza, Intradermal, Preservative free 2013    Pneumococcal Conjugate 13-valent ([id number]) 2017    Pneumococcal Polysaccharide (Degodawxx64) 01/07/2013    Tdap (Boostrix, Adacel) 11/07/2018    Zoster Live (Zostavax) 04/01/2014    Zoster Recombinant (Shingrix) 09/14/2019, 11/19/2019       Active Problems:  Patient Active Problem List   Diagnosis Code    Lipidemia E78.5    CAD (coronary artery disease) I25.10    HTN (hypertension) I10    Elevated PSA R97.20    Prostate cancer ([facility] C61    Vertigo R42    Neck pain M54.2    Venous stasis I87.8    Ischemic vascular dementia (HCC) F01.50    Acute metabolic encephalopathy T69.99    Hypernatremia E87.0    Altered mental status R41.82    Severe protein-calorie malnutrition ([facility] E43    Dislodged gastrostomy tube ([facility] Z43.1       Isolation/Infection:   Isolation          No Isolation        Patient Infection Status     Infection Onset Added Last Indicated Last Indicated By Review Planned Expiration Resolved Resolved By    None active    Resolved    COVID-19 Rule Out 04/27/20 04/27/20 04/27/20 COVID-19 (Ordered)   04/27/20 Rule-Out Test Resulted          Nurse Assessment:  Last Vital Signs: /72   Pulse 65   Temp 98.7 °F (37.1 °C) (Axillary)   Resp 16   Ht 6' 2\" (1.88 m)   Wt 173 lb 15.1 oz (78.9 kg)   SpO2 93%   BMI 22.33 kg/m²     Last documented pain score (0-10 scale): Pain Level: 0  Last Weight:   Wt Readings from Last 1 Encounters:   04/28/20 173 lb 15.1 oz (78.9 kg)     Mental Status:  disoriented and alert    IV Access:  - None    Nursing Mobility/ADLs:  Walking   Dependent  Transfer  Dependent  Bathing  Dependent  Dressing  Dependent  Toileting  Dependent  Feeding  Dependent  Med Admin  Dependent  Med Delivery   crushed    Wound Care Documentation and Therapy:  Wound 05/05/20 Heel Left;Medial Unstageable pressure injury (Active)   Wound Pressure Unstageable 5/6/2020  2:57 AM   Dressing/Treatment Betadine swabs;Open to air 5/6/2020  2:57 AM   Wound Length (cm) 4 cm 5/5/2020 12:38 PM   Wound Width (cm) 3 cm 5/5/2020 12:38 PM   Wound Depth (cm) 0 cm 5/5/2020 12:38 PM   Wound Therapy  Weight Bearing Status/Restrictions: No weight bearing restirctions  Other Medical Equipment (for information only, NOT a DME order):  lift  Other Treatments:     Patient's personal belongings (please select all that are sent with patient):  None    RN SIGNATURE:  Electronically signed by Zion Bay RN on 5/6/20 at 10:15 AM EDT    CASE MANAGEMENT/SOCIAL WORK SECTION    Inpatient Status Date: ***    Readmission Risk Assessment Score:  Readmission Risk              Risk of Unplanned Readmission:        35           Discharging to home with [facility]. Referral to [facility], wife to meet on Thursday at 430. [facility] for home tube feeds  [facility] for lift and suctioning machine. / signature: Electronically signed by Singh Basilio RN on 5/6/20 at 9:49 AM EDT    PHYSICIAN SECTION    Prognosis: Good    Condition at Discharge: Stable    Rehab Potential (if transferring to Rehab): Fair    Recommended Labs or Other Treatments After Discharge:   -Daily packing changes to midline - remove packing, flush with saline and replace with plain packing  - Tube feeds  Formula: 1.5 Calorie with Fiber Jevity 1.5   Route: Gastrostomy    Tube Feed Method: Continuous    Initial Rate (ml/hour): 10    Goal Rate (ml/hour): 70    Duration (hr): 24      - Flush G tube q4 hours with 10-20 cc and every time meds are put through the tube. Ensure to also flush if tube feeds stopped for any reason to keep tube patent  - Free water boluses 200 ml every 6 hrs  - Tube can be removed on 5/9 and then reinserted as needed for tube feeds and medications  - Follow up with Dr. Sanjuanita Lemus in his office on 5/28 for staple removal. Call [phone] to make an appointment    Physician Certification: I certify the above information and transfer of Damion Trammell  is necessary for the continuing treatment of the diagnosis listed and that he requires Hospice for greater 30 days.      Update Admission H&P: No change in H&P    PHYSICIAN SIGNATURE:  Electronically signed by Darrel Willams MD on 5/6/20 at 6:42 AM EDT

## 2020-05-06 NOTE — PROGRESS NOTES
Patient is alert to voice and unable to tell orientation status d/t patient being nonverbal. Patient eyes are open and he will occasionally smile. Tube feeds are going at goal rate and patient tolerated. Residuals checked q4h. Patient being turned on a q2h schedule. Will monitor for changes in status. Patient expected to discharge at [facility] as long no temp occurs. /68   Pulse 59   Temp 98.6 °F (37 °C) (Oral)   Resp 16   Ht 6' 2\" (1.88 m)   Wt 173 lb 15.1 oz (78.9 kg)   SpO2 92%   BMI 22.33 kg/m²     Patient denies any further needs at this time.  Bed is in the lowest position and call light is within reach    Electronically signed by Rakesh Covarrubias RN on 5/6/2020 at 10:06 AM

## 2020-05-06 NOTE — PROGRESS NOTES
Surgery Daily Progress Note  Angela Rivero  CC:  Malnutrition      Subjective : No acute events overnight. Tube feeds infusing at 70 cc/hr, which is goal. Had a fever yesterday. Having BMs, passing flatus      Objective    Infusions:   sodium chloride 50 mL/hr at 05/05/20 1950        I/O:I/O last 3 completed shifts: In: 2322 [NG/GT:2322]  Out: 525 [Urine:525]           Wt Readings from Last 1 Encounters:   04/28/20 173 lb 15.1 oz (78.9 kg)                 LABS:    Recent Labs     05/05/20  0451 05/06/20  0437   WBC 6.0 5.9   HGB 9.5* 9.8*   HCT 28.1* 29.4*   MCV 89.3 90.0    183        Recent Labs     05/05/20 0451 05/05/20  2352 05/06/20  0437      < > 141 141   K 3.8  --   --  3.7     --   --  103   CO2 26  --   --  25   PHOS 2.7  --   --  3.2   BUN 16  --   --  16   CREATININE 0.9  --   --  1.0    < > = values in this interval not displayed. Exam:/72   Pulse 65   Temp 98.7 °F (37.1 °C) (Axillary)   Resp 16   Ht 6' 2\" (1.88 m)   Wt 173 lb 15.1 oz (78.9 kg)   SpO2 93%   BMI 22.33 kg/m²      General appearance: alert, appears stated age and cooperative  Lungs: normal effort, no accessory muscle use, on room air  Heart: regular rate and rhythm  Abdomen: soft, appropriately-tender; bowel sounds present. Mild fullness around incision, with some erythema, incision opened, 20cc of hematoma expressed with a small amount of purulence. Tube feeds infusing in G tube without difficulty. Remains with low residuals      ASSESSMENT/PLAN: Pt. is a 75 y.o. male s/p laparoscopic assisted PEG placement secondary to malnutrition.  Required return to OR for [address] [place] [address] gastrostomy on (4/30)    - Cont tube feeds at goal, 70 ml/hr  - continue Reglan  - Colace liquid BID through G tube  - Incision flushed and packed  - Wound culture gathered  - If patient continues to have fever, consider placing on ABx  - Anticipate DC today, hospice  - OK to restart Plavix and ASA  - Disposition per primary team    Jaiden Luther DO PGY1  General Surgery Resident  05/06/20 6:32 AM  [phone]

## 2020-05-06 NOTE — PROGRESS NOTES
[facility]  General Surgery    Spoke with patients wife on the phone. Updated her as to the plan. Answered all her questions.     Nicky Jalloh DO PGY1  General Surgery Resident  05/06/20 9:32 AM  [phone]

## 2020-05-06 NOTE — PLAN OF CARE
Problem: Fluid Volume:  Goal: Ability to achieve a balanced intake and output will improve  Description: Ability to achieve a balanced intake and output will improve  5/6/2020 0959 by Linsey Damon RN  Outcome: Ongoing  Note: Patient continues to be on tube feedings and is at goal rate and tolerating. Patient voiding, incontinent and occurrences are documented. Problem: Skin Integrity:  Goal: Absence of new skin breakdown  Description: Absence of new skin breakdown  Outcome: Ongoing  Note: Patient is on a q2h turn schedule. Podus boots in place. Patient is on a speciality mattress. No new breakdown at this time.

## 2020-05-06 NOTE — PROGRESS NOTES
4 Eyes Admission Assessment     I agree as the admission nurse that 2 RN's have performed a thorough Head to Toe Skin Assessment on the patient. ALL assessment sites listed below have been assessed on admission. Areas assessed by both nurses:   [x]   Head, Face, and Ears   [x]   Shoulders, Back, and Chest  [x]   Arms, Elbows, and Hands   [x]   Coccyx, Sacrum, and Ischum  [x]   Legs, Feet, and Heels        Does the Patient have Skin Breakdown? Blanchable redness to coccyx, L medial heel with noted unstageable pressure injury as well as L great toe also having a dark unstageable wound. He already has wound care orders from the previous unit which we have continued. He also has a surgical incision on the upper medial abdomen and a G tube placed. Wound care is already consulted.         Ghulam Prevention initiated:  Yes   Wound Care Orders initiated:  Yes      [address]  nurse consulted for Pressure Injury (Stage 3,4, Unstageable, DTI, NWPT, and Complex wounds):  Yes      Nurse 1 eSignature: Electronically signed by José Miguel Hightower RN on 5/5/20 at 11:07 PM EDT    **SHARE this note so that the co-signing nurse is able to place an eSignature**    Nurse 2 eSignature: Electronically signed by Kirill Colbert RN on 5/5/20 at 11:43 PM EDT

## 2020-05-06 NOTE — PROGRESS NOTES
VSS; temp returned to afebrile this shift. Condom cath placed to reduce moisture on skin and prevent breakdown for this pt. Pt on specialty bed and being turned q2h. Betadine was applied on dayshift to unstageable wounds on toes and podus boots are on. SCDs on. Pt has opened his eyes to voice but has not verbalized this shift. PIV infusing per orders. Residuals have remained under parameters and pt tolerates his TF (Jevity 1.5) running at the goal 70 ml.hr. He is otherwise NPO. Frequent oral care provided with toothettes, mouth moisturizer and suction when needed. RT provided breathing Tx to pt and assisted with oral care as well. Per the Adv Dementia pain scale pt is a 0. Avasys on to monitor pt for safety reasons as he is a high fall risk. He has remained subdues this shift and has not attempted pulling lines this far. Lines are tucked and disguised to keep pt from unsafely removing them. Abdominal binder remains on pt to keep pt from harming the surgical site. Bed alarm set. Call light in reach. Will continue to monitor. Last Na+ 141; WNL.

## 2020-05-06 NOTE — PLAN OF CARE
Problem: Falls - Risk of:  Goal: Will remain free from falls  Description: Will remain free from falls  5/5/2020 2254 by Edison Obrien RN  Outcome: Met This Shift   Pt free from injury this shift and free of falls. 3/4 rails up on bed and bed is in the lowest position. Wheels locked and bed alarm set. Socks on pt and ID bands on pt. Avasys on. Call light in reach of pt and pt maxi-lift to mobilize. Will continue to monitor for safety. Problem: Fluid Volume:  Goal: Ability to achieve a balanced intake and output will improve  Description: Ability to achieve a balanced intake and output will improve  5/5/2020 2254 by Edison Obrien RN  Outcome: Ongoing   TF running at 70ml/hr per goal.  Voids WNL. IV infusing per orders. I/O last 3 completed shifts:   In: 2322 [NG/GT:2322]  Out: 525 [Urine:525]  I/O this shift:  In: 1010 [I.V.:752; NG/GT:258]  Out: 250 [Urine:250] + x1 occurance

## 2020-05-07 NOTE — PROGRESS NOTES
GI Progress Note    Joseph Holloway is a 75 y.o. male patient. 1. Altered mental status, unspecified altered mental status type    2. Dehydration    3. Hypernatremia    4. Weight loss    Admit Date: 2020    Subjective:       Doing well post g tube replacement  Tolerating TF  afebrle      ROS:  Cardiovascular ROS: no chest pain or dyspnea on exertion  Gastrointestinal ROS: no abdominal pain, change in bowel habits, or black or bloody stools  Respiratory ROS: no cough, shortness of breath, or wheezing    Scheduled Meds:      Continuous Infusions:      PRN Meds:        Objective:       Patient Vitals for the past 24 hrs:   BP Temp Temp src Pulse Resp SpO2   20 1045 135/71 98.9 °F (37.2 °C) Oral 62 16 92 %   20 0728 -- -- -- -- -- 92 %   20 0721 124/68 98.6 °F (37 °C) Oral 59 16 91 %   20 0304 120/72 98.7 °F (37.1 °C) Axillary 65 16 93 %   20 2314 134/77 98.4 °F (36.9 °C) Axillary 61 16 93 %   20 2252 -- -- -- -- 15 --       Exam:    VITALS:  /71   Pulse 62   Temp 98.9 °F (37.2 °C) (Oral)   Resp 16   Ht 6' 2\" (1.88 m)   Wt 173 lb 15.1 oz (78.9 kg)   SpO2 92%   BMI 22.33 kg/m²   TEMPERATURE:  Current - Temp: 98.9 °F (37.2 °C); Max - Temp  Av.7 °F (37.1 °C)  Min: 98.4 °F (36.9 °C)  Max: 98.9 °F (37.2 °C)    Non verbal  Head: Normocephalic, without obvious abnormality, atraumatic  Neck: supple, symmetrical, trachea midline and thyroid not enlarged, symmetric, no tenderness/mass/nodules  CVS:  RRR, Nl s1s2  Lungs CTA Bilaterally, normal effort  Abdomen: soft, non-tender; bowel sounds normal; no masses,  no organomegaly, G tube in good position  Extremities: No edema.     Lab Data:  Recent Labs     20  0434 20  0451 20  0437   WBC 6.0 6.0 5.9   HGB 10.1* 9.5* 9.8*   HCT 30.5* 28.1* 29.4*   MCV 91.3 89.3 90.0    164 183     Recent Labs     20  0434 20  1440  20  0451  20  2352 20  0437 20  0715   NA 145 142   < > 139   < > 141 141 141   K 3.9 4.2  --  3.8  --   --  3.7  --     105  --  104  --   --  103  --    CO2 28 26  --  26  --   --  25  --    PHOS 3.1  --   --  2.7  --   --  3.2  --    BUN 19  --   --  16  --   --  16  --    CREATININE 1.0  --   --  0.9  --   --  1.0  --     < > = values in this interval not displayed. No results for input(s): AST, ALT, ALB, BILIDIR, BILITOT, ALKPHOS in the last 72 hours. No results for input(s): LIPASE, AMYLASE in the last 72 hours. No results for input(s): PROT, INR in the last 72 hours. No results for input(s): PTT in the last 72 hours. No results for input(s): OCCULTBLD in the last 72 hours. Assessment:       Active Problems:    Hypernatremia    Altered mental status    Severe protein-calorie malnutrition ([address]    Dislodged gastrostomy tube (HCC)  Resolved Problems:    * No resolved hospital problems.  *  severe dementia  S/p g tube placement and replacement     Recommendations:       Continue TF  Expect discharge today  Call with any problem    Jeaneth Sepulveda MD  5/6/2020

## 2020-05-07 NOTE — TELEPHONE ENCOUNTER
Spoke with Ang Le, pts wife. She states the hospitalist told her that Dr. CASEY AVALOS would be doing a VV with pt and instruction nurse who will be at the house on how to remove the staples. She states she is unable to bring pt into the office.     Dr. CASEY AVALOS, please advise

## 2020-05-08 NOTE — TELEPHONE ENCOUNTER
Daily dressing changes on incision  If any packing in place it can be removed  There are 4 sutures around the feeding tube that can be removed 5/14  The staples in the midline can be removed 5/28

## 2020-05-26 NOTE — TELEPHONE ENCOUNTER
Pt wife called in to cancel appt. Pt is in hospice, and pt is not doing well at all, wife does not expect pt to make it thru. Wife wanted to cancel. I did let her know if she needed anything to please let us know.

## 2022-08-15 NOTE — PROGRESS NOTES
Speech Language Pathology  Facility/Department: [address]  Dysphagia Daily Treatment Note    NAME: Sheryl Evans  : 1945  MRN: [id number]    Patient Diagnosis(es):   Patient Active Problem List    Diagnosis Date Noted    Acute metabolic encephalopathy     Ischemic vascular dementia ([facility] 2020    Venous stasis 2019    Neck pain 2016    Prostate cancer ([facility] 2015    Vertigo 2015    Elevated PSA 2014    Lipidemia 12/10/2010    CAD (coronary artery disease) 12/10/2010    HTN (hypertension) 12/10/2010     Allergies: No Known Allergies  Onset Date: 2020  Current Diet Level:  NPO        CT of Chest 2020      No cause for acute abdominal pain identified.       Prostatomegaly.       Air-filled small and large bowel loops without evidence of obstruction.         CT head 2020  Age-related changes without acute intracranial abnormality.           Previous MBS (N/A)    Chart reviewed. Medical Diagnosis: acute metabolic encephalopathy  Treatment Diagnosis: dysphagia    BSE Impression (04/10/2020)-  Pt awakened to verbal stimuli, did not verbalize (attempted to whisper x 1) or follow commands. Pt did accept PO trials including puree, thin liquids via tsp/cup, not able to draw liquid up from straw. Pt exhibited no overt signs of aspiration, not able to assess voice quality. Swallow movement felt upon palpation of anterior neck, though intermittently is delayed. Pt  cleared bolus effectively from oral cavity. Attempted soft solid, however pt did not make attempt to masticate, removed from oral cavity. Recommend trial full liquids via tsp or cup with close monitoring for s/s of aspiration. Dysphagia Diagnosis: Suspected needs further assessment;Swallow function appears grossly intact  Dysphagia Outcome Severity Scale: Level 5: Mild dysphagia- Distant supervision.  May need one diet consistency restricted     MBS results Spoke to Marisela Rivera at [facility] regarding requested refill of Albuterol inhaler  Inquired with Marisela Rivera about if the refill was requested by the patient  Marisela Rivera reports they have it on automatic refills along with Flovent, Flonase, and Zyrtec  Informed Marisela Rivera that this RN feels it is inappropriate to have it on automatic refills as the Albuterol is a PRN medication to which Marisela Rivera agrees  Marisela Rivera removed the Albuterol inhaler from automatic refills  Albuterol was filled in March, April, May, June, and July of 2022  Will reach out to family regarding the need for Albuterol inhaler

## 2025-02-11 NOTE — PROGRESS NOTES
1055 Pt transported to PACU for OR pickup. Pt resting calmly throughout. IV placed to right forearm 20ga jelco, flushed to saline lock.   1104 pt to OR To get better and follow your care plan as instructed.

## (undated) DEVICE — PAD,ABDOMINAL,5"X9",ST,LF,25/BX: Brand: MEDLINE INDUSTRIES, INC.

## (undated) DEVICE — SUTURE ETHLN SZ 2-0 L18IN NONABSORBABLE BLK L19MM PS-2 PRIM 593H

## (undated) DEVICE — GOWN,SIRUS,POLYRNF,BRTHSLV,LG,30/CS: Brand: MEDLINE

## (undated) DEVICE — SEALER/DIVIDER LAP SHFT L44CM JAW APER 11.4MM 315DEG ROT

## (undated) DEVICE — SUTURE VCRL SZ 0 L18IN ABSRB UD POLYGLACTIN 910 BRAID TIE J912G

## (undated) DEVICE — SUTURE NONABSORBABLE MONOFILAMENT 2-0 FS 18 IN ETHILON 664H

## (undated) DEVICE — BLADELESS OBTURATOR: Brand: WECK VISTA

## (undated) DEVICE — NEEDLE INSUF L120MM DIA2MM DISP FOR PNEUMOPERI ENDOPATH

## (undated) DEVICE — GLOVE SURG SZ 75 L12IN THK75MIL DK GRN LTX FREE

## (undated) DEVICE — SUTURE PDS II SZ 1 L96IN ABSRB VLT TP-1 L65MM 1/2 CIR Z880G

## (undated) DEVICE — KIT,ANTI FOG,W/SPONGE & FLUID,SOFT PACK: Brand: MEDLINE

## (undated) DEVICE — LAPAROSCOPIC SCISSORS: Brand: EPIX LAPAROSCOPIC SCISSORS

## (undated) DEVICE — COVER LT HNDL BLU PLAS

## (undated) DEVICE — BAG DRNGE 2000ML UROLOGY ANTI REFLX CHMBR SAMP PRT

## (undated) DEVICE — SUTURE VCRL SZ 3-0 L18IN ABSRB UD L26MM SH 1/2 CIR J864D

## (undated) DEVICE — ELECTROSURGICAL PENCIL ROCKER SWITCH NON COATED BLADE ELECTRODE 10 FT (3 M) CORD HOLSTER: Brand: MEGADYNE

## (undated) DEVICE — STANDARD HYPODERMIC NEEDLE,POLYPROPYLENE HUB: Brand: MONOJECT

## (undated) DEVICE — BINDER ABD UNIV H9IN WAIST 45-62IN E SFT COT PREM 3 PNL

## (undated) DEVICE — STAPLER SKIN H3.9MM WIRE DIA0.58MM CRWN 6.9MM 35 STPL ROT

## (undated) DEVICE — SUTURE PERMAHAND SZ 3-0 L18IN NONABSORBABLE BLK L26MM SH C013D

## (undated) DEVICE — SOLUTION IV 1000ML 0.9% SOD CHL

## (undated) DEVICE — STAPLER INT L28CM 60MM 12 FIRING B FRM PWD + ECHELON FLX

## (undated) DEVICE — SUTURE VCRL SZ 0 L27IN ABSRB UD L26MM CT-2 1/2 CIR J270H

## (undated) DEVICE — APPLICATOR MEDICATED 26 CC SOLUTION HI LT ORNG CHLORAPREP

## (undated) DEVICE — SUTURE VCRL SZ 0 L54IN ABSRB VLT W/O NDL POLYGLACTIN 910 J616H

## (undated) DEVICE — RELOAD STPL L60MM H1.5-3.6MM REG TISS BLU GRIPPING SURF B

## (undated) DEVICE — TROCAR: Brand: KII FIOS FIRST ENTRY

## (undated) DEVICE — Device

## (undated) DEVICE — TUBE FEED GASTROSTOMY MIC 20FR

## (undated) DEVICE — SUTURE VCRL SZ 4-0 L18IN ABSRB UD L19MM PS-2 3/8 CIR PRIM J496H

## (undated) DEVICE — SYSTEM SMK EVAC LAP TBNG FILTER HSNG BENT STYL PNK SEE CLR

## (undated) DEVICE — Z DISCONTINUED (SUB = MFG CAT 4422) SPONGE GZ 4X4 IN 8 PLY NS

## (undated) DEVICE — SURGICAL SET UP - SURE SET: Brand: MEDLINE INDUSTRIES, INC.

## (undated) DEVICE — PUMP SUC IRR TBNG L10FT W/ HNDPC ASSEMB STRYKEFLOW 2

## (undated) DEVICE — SOLUTION INJ LR VISIV 1000ML BG

## (undated) DEVICE — JEWISH HOSPITAL TURNOVER KIT: Brand: MEDLINE INDUSTRIES, INC.

## (undated) DEVICE — CANNULA SEAL

## (undated) DEVICE — SURE SET-DOUBLE BASIN-LF: Brand: MEDLINE INDUSTRIES, INC.

## (undated) DEVICE — ADHESIVE SKIN CLSR 0.7ML TOP DERMBND ADV

## (undated) DEVICE — PLATE ES AD W 9FT CRD 2

## (undated) DEVICE — DRAPE,LAP,CHOLE,W/TROUGHS,STERILE: Brand: MEDLINE